# Patient Record
Sex: FEMALE | HISPANIC OR LATINO | Employment: FULL TIME | URBAN - METROPOLITAN AREA
[De-identification: names, ages, dates, MRNs, and addresses within clinical notes are randomized per-mention and may not be internally consistent; named-entity substitution may affect disease eponyms.]

---

## 2017-02-10 ENCOUNTER — OFFICE VISIT (OUTPATIENT)
Dept: URGENT CARE | Facility: URGENT CARE | Age: 52
End: 2017-02-10
Payer: COMMERCIAL

## 2017-02-10 VITALS
DIASTOLIC BLOOD PRESSURE: 84 MMHG | BODY MASS INDEX: 30.21 KG/M2 | SYSTOLIC BLOOD PRESSURE: 146 MMHG | OXYGEN SATURATION: 98 % | TEMPERATURE: 98.6 F | HEIGHT: 61 IN | HEART RATE: 101 BPM | WEIGHT: 160 LBS | RESPIRATION RATE: 15 BRPM

## 2017-02-10 DIAGNOSIS — E55.9 VITAMIN D DEFICIENCY: ICD-10-CM

## 2017-02-10 DIAGNOSIS — R82.90 NONSPECIFIC FINDING ON EXAMINATION OF URINE: ICD-10-CM

## 2017-02-10 DIAGNOSIS — N39.0 ACUTE UTI: Primary | ICD-10-CM

## 2017-02-10 DIAGNOSIS — R30.0 DYSURIA: ICD-10-CM

## 2017-02-10 LAB
ALBUMIN UR-MCNC: 100 MG/DL
APPEARANCE UR: ABNORMAL
BACTERIA #/AREA URNS HPF: ABNORMAL /HPF
BILIRUB UR QL STRIP: NEGATIVE
COLOR UR AUTO: YELLOW
GLUCOSE UR STRIP-MCNC: NEGATIVE MG/DL
HGB UR QL STRIP: ABNORMAL
KETONES UR STRIP-MCNC: NEGATIVE MG/DL
LEUKOCYTE ESTERASE UR QL STRIP: ABNORMAL
NITRATE UR QL: NEGATIVE
PH UR STRIP: 6 PH (ref 5–7)
RBC #/AREA URNS AUTO: ABNORMAL /HPF (ref 0–2)
SP GR UR STRIP: 1.02 (ref 1–1.03)
URN SPEC COLLECT METH UR: ABNORMAL
UROBILINOGEN UR STRIP-ACNC: 0.2 EU/DL (ref 0.2–1)
WBC #/AREA URNS AUTO: >100 /HPF (ref 0–2)

## 2017-02-10 PROCEDURE — 87186 SC STD MICRODIL/AGAR DIL: CPT | Performed by: INTERNAL MEDICINE

## 2017-02-10 PROCEDURE — 99214 OFFICE O/P EST MOD 30 MIN: CPT | Performed by: PHYSICIAN ASSISTANT

## 2017-02-10 PROCEDURE — 87088 URINE BACTERIA CULTURE: CPT | Performed by: INTERNAL MEDICINE

## 2017-02-10 PROCEDURE — 82306 VITAMIN D 25 HYDROXY: CPT | Performed by: NURSE PRACTITIONER

## 2017-02-10 PROCEDURE — 81001 URINALYSIS AUTO W/SCOPE: CPT | Performed by: INTERNAL MEDICINE

## 2017-02-10 PROCEDURE — 87086 URINE CULTURE/COLONY COUNT: CPT | Performed by: INTERNAL MEDICINE

## 2017-02-10 PROCEDURE — 36415 COLL VENOUS BLD VENIPUNCTURE: CPT | Performed by: NURSE PRACTITIONER

## 2017-02-10 RX ORDER — CEFUROXIME AXETIL 500 MG/1
500 TABLET ORAL 2 TIMES DAILY
Qty: 20 TABLET | Refills: 0 | Status: SHIPPED | OUTPATIENT
Start: 2017-02-10 | End: 2017-03-02

## 2017-02-10 RX ORDER — PHENAZOPYRIDINE HYDROCHLORIDE 100 MG/1
100 TABLET, FILM COATED ORAL 3 TIMES DAILY PRN
Qty: 6 TABLET | Refills: 0 | Status: SHIPPED | OUTPATIENT
Start: 2017-02-10 | End: 2017-11-16

## 2017-02-10 NOTE — LETTER
[facility]   [address], [place]  [address]  [phone]      February 14, 2017      Charu Contreras  [address]  [facility] [address]              Dear Ms. Contreras,    Vitamin D level is now in the normal range.  I would recommend taking vitamin D 2000 IU daily.    Results for orders placed or performed in visit on 02/10/17   *UA reflex to Microscopic and Culture ([facility] and [facility] (except [place] and [place])   Result Value Ref Range    Color Urine Yellow     Appearance Urine Slightly Cloudy     Glucose Urine Negative NEG mg/dL    Bilirubin Urine Negative NEG    Ketones Urine Negative NEG mg/dL    Specific Gravity Urine 1.025 1.003 - 1.035    Blood Urine Large (A) NEG    pH Urine 6.0 5.0 - 7.0 pH    Protein Albumin Urine 100 (A) NEG mg/dL    Urobilinogen Urine 0.2 0.2 - 1.0 EU/dL    Nitrite Urine Negative NEG    Leukocyte Esterase Urine Moderate (A) NEG    Source Midstream Urine    Urine Microscopic   Result Value Ref Range    WBC Urine >100 (A) 0 - 2 /HPF    RBC Urine  (A) 0 - 2 /HPF    Bacteria Urine Few (A) NEG /HPF   Vitamin D Deficiency   Result Value Ref Range    Vitamin D Deficiency screening 32 20 - 75 ug/L   Urine Culture Aerobic Bacterial   Result Value Ref Range    Specimen Description Midstream Urine     Culture Micro (A)      >100,000 colonies/mL Escherichia coli  >100,000 colonies/mL Strain 2 Proteus mirabilis      Micro Report Status FINAL 02/13/2017     Organism: >100,000 colonies/mL Escherichia coli     Organism: >100,000 colonies/mL Strain 2 Proteus mirabilis        Susceptibility    >100,000 colonies/ml escherichia coli (salvador) -  (no method available)     AMPICILLIN >=32 Resistant  ug/mL     CEFAZOLIN Value in next row  ug/mL      <=4 SusceptibleCefazolin SALVADOR breakpoints are for the treatment of uncomplicated urinary tract infections.  For the treatment of systemic infections, please contact the laboratory for additional  testing.     CEFOXITIN Value in next row  ug/mL      <=4 SusceptibleCefazolin CRISTI breakpoints are for the treatment of uncomplicated urinary tract infections.  For the treatment of systemic infections, please contact the laboratory for additional testing.     CEFTAZIDIME Value in next row  ug/mL      <=4 SusceptibleCefazolin CRISTI breakpoints are for the treatment of uncomplicated urinary tract infections.  For the treatment of systemic infections, please contact the laboratory for additional testing.     CEFTRIAXONE Value in next row  ug/mL      <=4 SusceptibleCefazolin CRISTI breakpoints are for the treatment of uncomplicated urinary tract infections.  For the treatment of systemic infections, please contact the laboratory for additional testing.     CIPROFLOXACIN Value in next row  ug/mL      <=4 SusceptibleCefazolin CRISTI breakpoints are for the treatment of uncomplicated urinary tract infections.  For the treatment of systemic infections, please contact the laboratory for additional testing.     GENTAMICIN Value in next row  ug/mL      <=4 SusceptibleCefazolin CRISTI breakpoints are for the treatment of uncomplicated urinary tract infections.  For the treatment of systemic infections, please contact the laboratory for additional testing.     LEVOFLOXACIN Value in next row  ug/mL      <=4 SusceptibleCefazolin CRISTI breakpoints are for the treatment of uncomplicated urinary tract infections.  For the treatment of systemic infections, please contact the laboratory for additional testing.     NITROFURANTOIN Value in next row  ug/mL      <=4 SusceptibleCefazolin CRISTI breakpoints are for the treatment of uncomplicated urinary tract infections.  For the treatment of systemic infections, please contact the laboratory for additional testing.     TOBRAMYCIN Value in next row  ug/mL      <=4 SusceptibleCefazolin CRISTI breakpoints are for the treatment of uncomplicated urinary tract infections.  For the treatment of systemic infections,  please contact the laboratory for additional testing.     Trimethoprim/Sulfa Value in next row  ug/mL      <=4 SusceptibleCefazolin SALVADOR breakpoints are for the treatment of uncomplicated urinary tract infections.  For the treatment of systemic infections, please contact the laboratory for additional testing.     AMPICILLIN/SULBACTAM Value in next row  ug/mL      <=4 SusceptibleCefazolin SALVADOR breakpoints are for the treatment of uncomplicated urinary tract infections.  For the treatment of systemic infections, please contact the laboratory for additional testing.     Piperacillin/Tazo Value in next row  ug/mL      <=4 SusceptibleCefazolin SALVADOR breakpoints are for the treatment of uncomplicated urinary tract infections.  For the treatment of systemic infections, please contact the laboratory for additional testing.     CEFEPIME Value in next row  ug/mL      <=4 SusceptibleCefazolin SALVADOR breakpoints are for the treatment of uncomplicated urinary tract infections.  For the treatment of systemic infections, please contact the laboratory for additional testing.    >100,000 colonies/ml strain 2 proteus mirabilis (salvador) -  (no method available)     AMPICILLIN Value in next row  ug/mL      <=4 SusceptibleCefazolin SALVADOR breakpoints are for the treatment of uncomplicated urinary tract infections.  For the treatment of systemic infections, please contact the laboratory for additional testing.     CEFAZOLIN Value in next row  ug/mL      8 SusceptibleCefazolin SALVADOR breakpoints are for the treatment of uncomplicated urinary tract infections.  For the treatment of systemic infections, please contact the laboratory for additional testing.     CEFOXITIN Value in next row  ug/mL      8 SusceptibleCefazolin SALVADOR breakpoints are for the treatment of uncomplicated urinary tract infections.  For the treatment of systemic infections, please contact the laboratory for additional testing.     CEFTAZIDIME Value in next row  ug/mL      8  SusceptibleCefazolin CRISTI breakpoints are for the treatment of uncomplicated urinary tract infections.  For the treatment of systemic infections, please contact the laboratory for additional testing.     CEFTRIAXONE Value in next row  ug/mL      8 SusceptibleCefazolin CRISTI breakpoints are for the treatment of uncomplicated urinary tract infections.  For the treatment of systemic infections, please contact the laboratory for additional testing.     CIPROFLOXACIN Value in next row  ug/mL      8 SusceptibleCefazolin CRISTI breakpoints are for the treatment of uncomplicated urinary tract infections.  For the treatment of systemic infections, please contact the laboratory for additional testing.     GENTAMICIN Value in next row  ug/mL      8 SusceptibleCefazolin CRISTI breakpoints are for the treatment of uncomplicated urinary tract infections.  For the treatment of systemic infections, please contact the laboratory for additional testing.     LEVOFLOXACIN Value in next row  ug/mL      8 SusceptibleCefazolin CRISTI breakpoints are for the treatment of uncomplicated urinary tract infections.  For the treatment of systemic infections, please contact the laboratory for additional testing.     NITROFURANTOIN Value in next row  ug/mL      8 SusceptibleCefazolin CRISTI breakpoints are for the treatment of uncomplicated urinary tract infections.  For the treatment of systemic infections, please contact the laboratory for additional testing.     TOBRAMYCIN Value in next row  ug/mL      8 SusceptibleCefazolin CRISTI breakpoints are for the treatment of uncomplicated urinary tract infections.  For the treatment of systemic infections, please contact the laboratory for additional testing.     Trimethoprim/Sulfa Value in next row  ug/mL      8 SusceptibleCefazolin CRISTI breakpoints are for the treatment of uncomplicated urinary tract infections.  For the treatment of systemic infections, please contact the laboratory for additional testing.      AMPICILLIN/SULBACTAM Value in next row  ug/mL      8 SusceptibleCefazolin CRISTI breakpoints are for the treatment of uncomplicated urinary tract infections.  For the treatment of systemic infections, please contact the laboratory for additional testing.     Piperacillin/Tazo Value in next row  ug/mL      8 SusceptibleCefazolin CRISTI breakpoints are for the treatment of uncomplicated urinary tract infections.  For the treatment of systemic infections, please contact the laboratory for additional testing.     CEFEPIME Value in next row  ug/mL      8 SusceptibleCefazolin CRISTI breakpoints are for the treatment of uncomplicated urinary tract infections.  For the treatment of systemic infections, please contact the laboratory for additional testing.           Sincerely,    Libby De Paz, ANGIE/nr

## 2017-02-11 NOTE — NURSING NOTE
"Chief Complaint   Patient presents with     Urgent Care     UTI     burning with urination, started today very bad. Going more often.        Initial /84 mmHg  Pulse 101  Temp(Src) 98.6  F (37  C) (Oral)  Resp 15  Ht 5' 1\" (1.549 m)  Wt 160 lb (72.576 kg)  BMI 30.25 kg/m2  SpO2 98% Estimated body mass index is 30.25 kg/(m^2) as calculated from the following:    Height as of this encounter: 5' 1\" (1.549 m).    Weight as of this encounter: 160 lb (72.576 kg).  Medication Reconciliation: complete   huong stallings    "

## 2017-02-11 NOTE — PROGRESS NOTES
"SUBJECTIVE:   Charu Contreras is a 51 year old female who  presents today for a possible UTI. Symptoms of dysuria, urgency and frequency have been going on for 1day(s).  Hematuria yes .  sudden onset and moderate.  There is no history of fever, chills, nausea or vomiting.  No history of penile discharge. This patient does have a history of urinary tract infections. Patient denies long duration, rigors, flank pain, temperature > 101 degrees F. and Vomiting, significant nausea or diarrhea.     Would also like her Vitamin D level drawn.  Future order in EPIC    No past medical history on file.  Patient Active Problem List   Diagnosis     Hyperlipidemia, unspecified hyperlipidemia type     Obesity, unspecified obesity severity, unspecified obesity type     Vitamin D deficiency     Social History   Substance Use Topics     Smoking status: Never Smoker      Smokeless tobacco: Not on file     Alcohol Use: No       ROS:   CONSTITUTIONAL:NEGATIVE for fever, chills, change in weight  INTEGUMENTARY/SKIN: NEGATIVE for worrisome rashes, moles or lesions  : as per HPI    OBJECTIVE:  /84 mmHg  Pulse 101  Temp(Src) 98.6  F (37  C) (Oral)  Resp 15  Ht 5' 1\" (1.549 m)  Wt 160 lb (72.576 kg)  BMI 30.25 kg/m2  SpO2 98%  GENERAL APPEARANCE: healthy, alert and no distress  ABDOMEN:  soft, nontender, no HSM or masses and bowel sounds normal  BACK: No CVA tenderness  SKIN: no suspicious lesions or rashes    (N39.0) Acute UTI  (primary encounter diagnosis)  Comment:   Plan: cefUROXime (CEFTIN) 500 MG tablet,         phenazopyridine (PYRIDIUM) 100 MG tablet            (R30.0) Dysuria  Comment:   Plan: *UA reflex to Microscopic and Culture         ([facility] (except         [place] and [place]), Urine Microscopic            (R82.90) Nonspecific finding on examination of urine  Comment:   Plan: Urine Culture Aerobic Bacterial            (E55.9) Vitamin D deficiency  Comment:   Plan: lab " drawn per PCP order.

## 2017-02-13 LAB
BACTERIA SPEC CULT: ABNORMAL
DEPRECATED CALCIDIOL+CALCIFEROL SERPL-MC: 32 UG/L (ref 20–75)
MICRO REPORT STATUS: ABNORMAL
MICROORGANISM SPEC CULT: ABNORMAL
MICROORGANISM SPEC CULT: ABNORMAL
SPECIMEN SOURCE: ABNORMAL

## 2017-03-02 ENCOUNTER — OFFICE VISIT (OUTPATIENT)
Dept: FAMILY MEDICINE | Facility: CLINIC | Age: 52
End: 2017-03-02
Payer: COMMERCIAL

## 2017-03-02 VITALS
OXYGEN SATURATION: 98 % | BODY MASS INDEX: 31.18 KG/M2 | TEMPERATURE: 97.1 F | RESPIRATION RATE: 18 BRPM | SYSTOLIC BLOOD PRESSURE: 135 MMHG | HEART RATE: 75 BPM | DIASTOLIC BLOOD PRESSURE: 73 MMHG | WEIGHT: 165 LBS

## 2017-03-02 DIAGNOSIS — R07.9 CHEST PAIN, UNSPECIFIED TYPE: Primary | ICD-10-CM

## 2017-03-02 DIAGNOSIS — R39.9 SYMPTOMS INVOLVING URINARY SYSTEM: ICD-10-CM

## 2017-03-02 LAB
ALBUMIN UR-MCNC: NEGATIVE MG/DL
APPEARANCE UR: CLEAR
BACTERIA #/AREA URNS HPF: ABNORMAL /HPF
BILIRUB UR QL STRIP: NEGATIVE
COLOR UR AUTO: YELLOW
GLUCOSE UR STRIP-MCNC: NEGATIVE MG/DL
HGB UR QL STRIP: NEGATIVE
KETONES UR STRIP-MCNC: NEGATIVE MG/DL
LEUKOCYTE ESTERASE UR QL STRIP: ABNORMAL
NITRATE UR QL: NEGATIVE
NON-SQ EPI CELLS #/AREA URNS LPF: ABNORMAL /LPF
PH UR STRIP: 6 PH (ref 5–7)
RBC #/AREA URNS AUTO: ABNORMAL /HPF (ref 0–2)
SP GR UR STRIP: >1.03 (ref 1–1.03)
URN SPEC COLLECT METH UR: ABNORMAL
UROBILINOGEN UR STRIP-ACNC: 0.2 EU/DL (ref 0.2–1)
WBC #/AREA URNS AUTO: ABNORMAL /HPF (ref 0–2)

## 2017-03-02 PROCEDURE — 81001 URINALYSIS AUTO W/SCOPE: CPT | Performed by: NURSE PRACTITIONER

## 2017-03-02 PROCEDURE — 99214 OFFICE O/P EST MOD 30 MIN: CPT | Performed by: NURSE PRACTITIONER

## 2017-03-02 PROCEDURE — 93000 ELECTROCARDIOGRAM COMPLETE: CPT | Performed by: NURSE PRACTITIONER

## 2017-03-02 NOTE — PROGRESS NOTES
SUBJECTIVE:                                                    Charu Contreras is a 51 year old female who presents to clinic today for the following health issues:    Prescription request: vitamin D liquid     Increased heart rate when exercising   She has intermittent chest pain that can occur about once a week and lasts for just minutes.  This can occur at rest.  She denies any shortness of breath, radiating pain, diaphoresis, nausea.  This has occurred for a couple of years.    When she starts running the machine will blink a warning that her heart rate is too high - it will be around 170-180.  She will not feel nauseated or dizzy.  When she starts walking it will come down to around 140-150.      No family history of heart disease.     She was treated recently for a UTI.  She is still having some mild dysuria and would like to make sure that it has resolved.            Problem list and histories reviewed & adjusted, as indicated.  Additional history: as documented    Patient Active Problem List   Diagnosis     Hyperlipidemia, unspecified hyperlipidemia type     Obesity, unspecified obesity severity, unspecified obesity type     Vitamin D deficiency     Past Surgical History   Procedure Laterality Date     Tubal ligation         Social History   Substance Use Topics     Smoking status: Never Smoker     Smokeless tobacco: Not on file     Alcohol use No     Family History   Problem Relation Age of Onset     Glaucoma No family hx of      Macular Degeneration No family hx of            Reviewed and updated as needed this visit by clinical staff       Reviewed and updated as needed this visit by Provider         ROS:  C: NEGATIVE for fever, chills, change in weight  E/M: NEGATIVE for ear, mouth and throat problems  R: NEGATIVE for significant cough or SOB  CV: see HPI  GI: NEGATIVE for nausea, abdominal pain, heartburn, or change in bowel habits  : see HPI  MUSCULOSKELETAL: NEGATIVE for significant  arthralgias or myalgia  NEURO: NEGATIVE for weakness, dizziness or paresthesias    OBJECTIVE:                                                    /73  Pulse 75  Temp 97.1  F (36.2  C) (Tympanic)  Resp 18  Wt 165 lb (74.8 kg)  SpO2 98%  BMI 31.18 kg/m2  Body mass index is 31.18 kg/(m^2).  GENERAL: healthy, alert and no distress  RESP: lungs clear to auscultation - no rales, rhonchi or wheezes  CV: regular rate and rhythm, normal S1 S2, no S3 or S4, no murmur, click or rub, no peripheral edema and peripheral pulses strong  MS: no gross musculoskeletal defects noted, no edema  SKIN: no suspicious lesions or rashes         ASSESSMENT/PLAN:                                                            1. Chest pain, unspecified type  Not typical for cardiac, but will obtain a stress echo to R/O.  Discussed a gradual conditioning with exercise to avoid a more rapid increase in heart rate.    - Exercise Stress Echocardiogram; Future  - EKG 12-lead complete w/read - Clinics    2. Symptoms involving urinary system  UA is negative.   - *UA reflex to Microscopic        Libby De Paz NP  [facility]

## 2017-03-02 NOTE — MR AVS SNAPSHOT
"              After Visit Summary   3/2/2017    Charu Contreras    MRN: [id number]           Patient Information     Date Of Birth          1965        Visit Information        Provider Department      3/2/2017 7:30 AM Libby De Paz NP [facility]        Today's Diagnoses     Chest pain, unspecified type    -  1    Symptoms involving urinary system          Care Instructions    Call [phone] to schedule a stress echocardiogram.         Follow-ups after your visit        Future tests that were ordered for you today     Open Future Orders        Priority Expected Expires Ordered    Exercise Stress Echocardiogram Routine  3/2/2018 3/2/2017            Who to contact     If you have questions or need follow up information about today's clinic visit or your schedule please contact [facility] directly at [phone].  Normal or non-critical lab and imaging results will be communicated to you by MyChart, letter or phone within 4 business days after the clinic has received the results. If you do not hear from us within 7 days, please contact the clinic through MyChart or phone. If you have a critical or abnormal lab result, we will notify you by phone as soon as possible.  Submit refill requests through "[employer] or call your pharmacy and they will forward the refill request to us. Please allow 3 business days for your refill to be completed.          Additional Information About Your Visit        MyChart Information     "[employer] lets you send messages to your doctor, view your test results, renew your prescriptions, schedule appointments and more. To sign up, go to [email or web address] . Click on \"Log in\" on the left side of the screen, which will take you to the Welcome page. Then click on \"Sign up Now\" on the right side of the page.     You will be asked to enter the access code listed below, as well as some personal information. Please follow the directions " to create your username and password.     Your access code is: [email or web address]  Expires: 2017  8:03 AM     Your access code will  in 90 days. If you need help or a new code, please call your [facility] or [phone].        Care EveryWhere ID     This is your Care EveryWhere ID. This could be used by other organizations to access your [place] medical records  [id number]        Your Vitals Were     Pulse Temperature Respirations Pulse Oximetry BMI (Body Mass Index)       75 97.1  F (36.2  C) (Tympanic) 18 98% 31.18 kg/m2        Blood Pressure from Last 3 Encounters:   17 135/73   02/10/17 146/84   16 130/76    Weight from Last 3 Encounters:   17 165 lb (74.8 kg)   02/10/17 160 lb (72.6 kg)   16 166 lb (75.3 kg)              We Performed the Following     *UA reflex to Microscopic     EKG 12-lead complete w/read - Clinics        Primary Care Provider    Physician No Ref-Primary       No address on file        Thank you!     Thank you for choosing [facility]  for your care. Our goal is always to provide you with excellent care. Hearing back from our patients is one way we can continue to improve our services. Please take a few minutes to complete the written survey that you may receive in the mail after your visit with us. Thank you!             Your Updated Medication List - Protect others around you: Learn how to safely use, store and throw away your medicines at www.disposemymeds.org.          This list is accurate as of: 3/2/17  8:03 AM.  Always use your most recent med list.                   Brand Name Dispense Instructions for use    clobetasol 0.05 % cream    TEMOVATE    30 g    Apply sparingly to affected area twice daily as needed       MULTI VITAMIN PO      Take by mouth daily       phenazopyridine 100 MG tablet    PYRIDIUM    6 tablet    Take 1 tablet (100 mg) by mouth 3 times daily as needed for irritation       VITAMIN D (CHOLECALCIFEROL) PO       Take by mouth daily

## 2017-03-02 NOTE — NURSING NOTE
"Chief Complaint   Patient presents with     Medication Request     vitamin D liquid      Tachycardia     increased heart rate when exercising        Initial /73  Pulse 75  Temp 97.1  F (36.2  C) (Tympanic)  Resp 18  Wt 165 lb (74.8 kg)  SpO2 98%  BMI 31.18 kg/m2 Estimated body mass index is 31.18 kg/(m^2) as calculated from the following:    Height as of 2/10/17: 5' 1\" (1.549 m).    Weight as of this encounter: 165 lb (74.8 kg).  Medication Reconciliation: complete     Kacie Mejia MA      "

## 2017-11-16 ENCOUNTER — OFFICE VISIT (OUTPATIENT)
Dept: FAMILY MEDICINE | Facility: CLINIC | Age: 52
End: 2017-11-16
Payer: COMMERCIAL

## 2017-11-16 VITALS
HEART RATE: 69 BPM | HEIGHT: 61 IN | WEIGHT: 158.5 LBS | DIASTOLIC BLOOD PRESSURE: 84 MMHG | OXYGEN SATURATION: 97 % | SYSTOLIC BLOOD PRESSURE: 120 MMHG | RESPIRATION RATE: 12 BRPM | BODY MASS INDEX: 29.92 KG/M2 | TEMPERATURE: 97.7 F

## 2017-11-16 DIAGNOSIS — Z11.59 NEED FOR HEPATITIS C SCREENING TEST: ICD-10-CM

## 2017-11-16 DIAGNOSIS — Z13.1 SCREENING FOR DIABETES MELLITUS: ICD-10-CM

## 2017-11-16 DIAGNOSIS — R21 RASH AND NONSPECIFIC SKIN ERUPTION: ICD-10-CM

## 2017-11-16 DIAGNOSIS — F32.0 MILD MAJOR DEPRESSION (H): ICD-10-CM

## 2017-11-16 DIAGNOSIS — Z13.0 SCREENING, ANEMIA, DEFICIENCY, IRON: ICD-10-CM

## 2017-11-16 DIAGNOSIS — E78.5 HYPERLIPIDEMIA, UNSPECIFIED HYPERLIPIDEMIA TYPE: ICD-10-CM

## 2017-11-16 DIAGNOSIS — Z00.00 ROUTINE GENERAL MEDICAL EXAMINATION AT A HEALTH CARE FACILITY: Primary | ICD-10-CM

## 2017-11-16 DIAGNOSIS — E66.9 OBESITY, UNSPECIFIED OBESITY SEVERITY, UNSPECIFIED OBESITY TYPE: ICD-10-CM

## 2017-11-16 DIAGNOSIS — Z12.31 ENCOUNTER FOR SCREENING MAMMOGRAM FOR BREAST CANCER: ICD-10-CM

## 2017-11-16 DIAGNOSIS — Z12.11 SPECIAL SCREENING FOR MALIGNANT NEOPLASMS, COLON: ICD-10-CM

## 2017-11-16 DIAGNOSIS — Z23 NEED FOR TDAP VACCINATION: ICD-10-CM

## 2017-11-16 DIAGNOSIS — E55.9 VITAMIN D DEFICIENCY: ICD-10-CM

## 2017-11-16 PROCEDURE — 90715 TDAP VACCINE 7 YRS/> IM: CPT | Performed by: FAMILY MEDICINE

## 2017-11-16 PROCEDURE — 82306 VITAMIN D 25 HYDROXY: CPT | Performed by: FAMILY MEDICINE

## 2017-11-16 PROCEDURE — 90471 IMMUNIZATION ADMIN: CPT | Performed by: FAMILY MEDICINE

## 2017-11-16 PROCEDURE — 86803 HEPATITIS C AB TEST: CPT | Performed by: FAMILY MEDICINE

## 2017-11-16 PROCEDURE — 99396 PREV VISIT EST AGE 40-64: CPT | Mod: 25 | Performed by: FAMILY MEDICINE

## 2017-11-16 PROCEDURE — 80050 GENERAL HEALTH PANEL: CPT | Performed by: FAMILY MEDICINE

## 2017-11-16 PROCEDURE — 36415 COLL VENOUS BLD VENIPUNCTURE: CPT | Performed by: FAMILY MEDICINE

## 2017-11-16 PROCEDURE — 80061 LIPID PANEL: CPT | Performed by: FAMILY MEDICINE

## 2017-11-16 NOTE — NURSING NOTE
"Chief Complaint   Patient presents with     Physical       Initial /84 (BP Location: Right arm, Patient Position: Chair, Cuff Size: Adult Regular)  Pulse 69  Temp 97.7  F (36.5  C) (Oral)  Resp 12  Ht 5' 1\" (1.549 m)  Wt 158 lb 8 oz (71.9 kg)  SpO2 97%  BMI 29.95 kg/m2 Estimated body mass index is 29.95 kg/(m^2) as calculated from the following:    Height as of this encounter: 5' 1\" (1.549 m).    Weight as of this encounter: 158 lb 8 oz (71.9 kg).  Medication Reconciliation: complete Ovidio Mejias MA      "

## 2017-11-16 NOTE — LETTER
My Depression Action Plan  Name: Charu Contreras   Date of Birth 1965  Date: 11/16/2017    My doctor: No Ref-Primary, Physician   My clinic: [address] [address] [phone]          GREEN    ZONE   Good Control    What it looks like:     Things are going generally well. You have normal up s and down s. You may even feel depressed from time to time, but bad moods usually last less than a day.   What you need to do:  1. Continue to care for yourself (see self care plan)  2. Check your depression survival kit and update it as needed  3. Follow your physician s recommendations including any medication.  4. Do not stop taking medication unless you consult with your physician first.           YELLOW         ZONE Getting Worse    What it looks like:     Depression is starting to interfere with your life.     It may be hard to get out of bed; you may be starting to isolate yourself from others.    Symptoms of depression are starting to last most all day and this has happened for several days.     You may have suicidal thoughts but they are not constant.   What you need to do:     1. Call your care team, your response to treatment will improve if you keep your care team informed of your progress. Yellow periods are signs an adjustment may need to be made.     2. Continue your self-care, even if you have to fake it!    3. Talk to someone in your support network    4. Open up your depression survival kit           RED    ZONE Medical Alert - Get Help    What it looks like:     Depression is seriously interfering with your life.     You may experience these or other symptoms: You can t get out of bed most days, can t work or engage in other necessary activities, you have trouble taking care of basic hygiene, or basic responsibilities, thoughts of suicide or death that will not go away, self-injurious behavior.     What you need to do:  1. Call  your care team and request a same-day appointment. If they are not available (weekends or after hours) call your local crisis line, emergency room or 911.      Electronically signed by: Lorrie Floyd, November 16, 2017    Depression Self Care Plan / Survival Kit    Self-Care for Depression  Here s the deal. Your body and mind are really not as separate as most people think.  What you do and think affects how you feel and how you feel influences what you do and think. This means if you do things that people who feel good do, it will help you feel better.  Sometimes this is all it takes.  There is also a place for medication and therapy depending on how severe your depression is, so be sure to consult with your medical provider and/ or Behavioral Health Consultant if your symptoms are worsening or not improving.     In order to better manage my stress, I will:    Exercise  Get some form of exercise, every day. This will help reduce pain and release endorphins, the  feel good  chemicals in your brain. This is almost as good as taking antidepressants!  This is not the same as joining a gym and then never going! (they count on that by the way ) It can be as simple as just going for a walk or doing some gardening, anything that will get you moving.      Hygiene   Maintain good hygiene (Get out of bed in the morning, Make your bed, Brush your teeth, Take a shower, and Get dressed like you were going to work, even if you are unemployed).  If your clothes don't fit try to get ones that do.    Diet  I will strive to eat foods that are good for me, drink plenty of water, and avoid excessive sugar, caffeine, alcohol, and other mood-altering substances.  Some foods that are helpful in depression are: complex carbohydrates, B vitamins, flaxseed, fish or fish oil, fresh fruits and vegetables.    Psychotherapy  I agree to participate in Individual Therapy (if recommended).    Medication  If prescribed medications, I agree to take  them.  Missing doses can result in serious side effects.  I understand that drinking alcohol, or other illicit drug use, may cause potential side effects.  I will not stop my medication abruptly without first discussing it with my provider.    Staying Connected With Others  I will stay in touch with my friends, family members, and my primary care provider/team.    Use your imagination  Be creative.  We all have a creative side; it doesn t matter if it s oil painting, sand castles, or mud pies! This will also kick up the endorphins.    Witness Beauty  (AKA stop and smell the roses) Take a look outside, even in mid-winter. Notice colors, textures. Watch the squirrels and birds.     Service to others  Be of service to others.  There is always someone else in need.  By helping others we can  get out of ourselves  and remember the really important things.  This also provides opportunities for practicing all the other parts of the program.    Humor  Laugh and be silly!  Adjust your TV habits for less news and crime-drama and more comedy.    Control your stress  Try breathing deep, massage therapy, biofeedback, and meditation. Find time to relax each day.     My support system    Clinic Contact:  Phone number:    Contact 1:  Phone number:    Contact 2:  Phone number:    Zoroastrian/:  Phone number:    Therapist:  Phone number:    Local crisis center:    Phone number:    Other community support:  Phone number:

## 2017-11-16 NOTE — PATIENT INSTRUCTIONS
Breast exam monthly   mammogram ordered  Pelvic /pap due 2021 with HPV  Labs today   Get us report of normal colonoscopy you said you had 2 yrs ago  Recommend flu shot yearly ( declined)   Tdap given today   For rash see dermatology  Do dexa and stress echo ordered previously     Preventive Health Recommendations  Female Ages 50 - 64    Yearly exam: See your health care provider every year in order to  o Review health changes.   o Discuss preventive care.    o Review your medicines if your doctor has prescribed any.      Get a Pap test every three years (unless you have an abnormal result and your provider advises testing more often).    If you get Pap tests with HPV test, you only need to test every 5 years, unless you have an abnormal result.     You do not need a Pap test if your uterus was removed (hysterectomy) and you have not had cancer.    You should be tested each year for STDs (sexually transmitted diseases) if you're at risk.     Have a mammogram every 1 to 2 years.    Have a colonoscopy at age 50, or have a yearly FIT test (stool test). These exams screen for colon cancer.      Have a cholesterol test every 5 years, or more often if advised.    Have a diabetes test (fasting glucose) every three years. If you are at risk for diabetes, you should have this test more often.     If you are at risk for osteoporosis (brittle bone disease), think about having a bone density scan (DEXA).    Shots: Get a flu shot each year. Get a tetanus shot every 10 years.    Nutrition:     Eat at least 5 servings of fruits and vegetables each day.    Eat whole-grain bread, whole-wheat pasta and brown rice instead of white grains and rice.    Talk to your provider about Calcium and Vitamin D.     Lifestyle    Exercise at least 150 minutes a week (30 minutes a day, 5 days a week). This will help you control your weight and prevent disease.    Limit alcohol to one drink per day.    No smoking.     Wear sunscreen to prevent skin  cancer.     See your dentist every six months for an exam and cleaning.    See your eye doctor every 1 to 2 years.    Managing Atopic Dermatitis (Eczema)     After bathing, gently pat your skin dry (don t rub). Apply moisturizer while your skin is still damp.   To manage your symptoms and help reduce the severity and frequency, try these self-care tips:  Caring for your skin    Use a gentle, fragrance-free cleanser (or nonsoap cleanser) for bathing. Rinse well. Pat skin dry.    Take warm, not hot, baths or showers. Try to limit them to no more that 10 to 15 minutes.     Use moisturizer liberally right after you bathe, while your skin is still damp.    Avoid scratching because it will cause more damage to your skin.     Topical, over-the-counter hydrocortisone cream may help control mild symptoms.   Controlling your environment    Avoid extreme heat or cold.    Avoid very humid or very dry air.    If your home or office air is very dry, use a humidifier.    Avoid allergens, such as dust, that may be present in bedding, carpets, plush toys, or rugs.    Know that pet hair and dander can cause flare-ups.  Seeking medical treatment  Another way to keep symptoms under control is to seek medical treatment. Talk with your healthcare provider about the type of treatment that may work best for you. Your provider may prescribe treatments such as the following:    Topical treatments to put on the skin daily    Medicines taken by mouth (oral medicines), such as antihistamines, antibiotics, or corticosteroids    In severe cases shots (injections) may be needed to control the symptoms. You may even need antibiotics if skin infections occur.  Treatments don t work the same way for every person. So if your symptoms continue or get worse, ask your healthcare provider about other treatments.  Making lifestyle choices    Manage the stress in your life.    Wear loose-fitting cotton clothing that does not bind or rub your skin.    Avoid  contact with wool or other scratchy fabrics.    Use fragrance-free products.  Getting good results  Now that you know more about atopic dermatitis, the next step is up to you. Follow your healthcare provider s treatment plan and your self-care routine. This will help bring atopic dermatitis under control. If your symptoms persist, be sure to let your health care provider know.   Date Last Reviewed: 2/1/2017 2000-2017 [employer]. [address] [place] [address], [place] [address]. All rights reserved. This information is not intended as a substitute for professional medical care. Always follow your healthcare professional's instructions.        What is Atopic Dermatitis?  Atopic dermatitis (also called eczema) causes chronic skin irritation. It is often found in infants, teens, and adults. This disease often runs in families (is genetic). It may also be linked to allergies, such as hay fever and sometimes asthma. Patches of skin become dry, red, itchy, and scaly. In older adults, abnormally dry skin is often called xerosis. Sometimes eczema is only on the hands or feet. It often improves when the skin is well hydrated. It gets worse when the skin is dry. You can help control symptoms by practicing good self-care. Avoid anything that causes flare-ups (such as sunburn or vigorous scratching).  Where do you have symptoms?  Atopic dermatitis symptoms can appear anywhere on the body. But in most cases they vary based on the person s age. In infants, irritation is often seen on the cheeks, chin, near the mouth, and under the eyelids. In children ages 2 through 10, skin folds, such as the backs of the knees, or in the arm crease, are most often affected. In children 11 and older and in adults, symptoms can affect many areas.  What triggers symptoms?  Symptoms flare because of many things. These include skin dryness, scratching, stress, harsh soaps, and irritants such as dust or wool. Try to avoid anything that  causes flare-ups.  Recognizing what causes flare-ups  To figure out what causes atopic dermatitis to flare, keep a list of things that seem to affect your skin. Start by filling in the spaces below. Then keep writing them down in a notebook or diary. The things that affect each person vary. So keep your own list and try to avoid your triggers.    Date Last Reviewed: 2/1/2017 2000-2017 [employer]. [address] [place] [address], [place] [address]. All rights reserved. This information is not intended as a substitute for professional medical care. Always follow your healthcare professional's instructions.

## 2017-11-16 NOTE — LETTER
November 17, 2017      Charu Contreras  [address]  [facility] [address]        Dear ,    We are writing to inform you of your test results.    Results within acceptable limits.  -Normal red blood cell (hgb) levels, normal white blood cell count and normal platelet levels..    Resulted Orders   CBC with platelets differential   Result Value Ref Range    WBC 8.8 4.0 - 11.0 10e9/L    RBC Count 4.58 3.8 - 5.2 10e12/L    Hemoglobin 13.9 11.7 - 15.7 g/dL    Hematocrit 42.4 35.0 - 47.0 %    MCV 93 78 - 100 fl    MCH 30.3 26.5 - 33.0 pg    MCHC 32.8 31.5 - 36.5 g/dL    RDW 13.3 10.0 - 15.0 %    Platelet Count 305 150 - 450 10e9/L    Diff Method Automated Method     % Neutrophils 59.4 %    % Lymphocytes 30.2 %    % Monocytes 7.6 %    % Eosinophils 2.5 %    % Basophils 0.3 %    Absolute Neutrophil 5.2 1.6 - 8.3 10e9/L    Absolute Lymphocytes 2.7 0.8 - 5.3 10e9/L    Absolute Monocytes 0.7 0.0 - 1.3 10e9/L    Absolute Eosinophils 0.2 0.0 - 0.7 10e9/L    Absolute Basophils 0.0 0.0 - 0.2 10e9/L     If you have any questions or concerns, please call the clinic at the number listed above.   Sincerely,  Lorrie Floyd MD/nr

## 2017-11-16 NOTE — MR AVS SNAPSHOT
After Visit Summary   11/16/2017    Charu Contreras    MRN: [id number]           Patient Information     Date Of Birth          1965        Visit Information        Provider Department      11/16/2017 11:40 AM Lorrie Floyd MD [facility]        Today's Diagnoses     Routine general medical examination at a health care facility    -  1    Encounter for screening mammogram for breast cancer        Obesity, unspecified obesity severity, unspecified obesity type        Hyperlipidemia, unspecified hyperlipidemia type        Vitamin D deficiency        Rash and nonspecific skin eruption        Screening, anemia, deficiency, iron        Screening for diabetes mellitus        Need for hepatitis C screening test        Special screening for malignant neoplasms, colon        Need for Tdap vaccination        Mild major depression (H)          Care Instructions    Breast exam monthly   mammogram ordered  Pelvic /pap due 2021 with HPV  Labs today   Get us report of normal colonoscopy you said you had 2 yrs ago  Recommend flu shot yearly ( declined)   Tdap given today   For rash see dermatology  Do dexa and stress echo ordered previously     Preventive Health Recommendations  Female Ages 50 - 64    Yearly exam: See your health care provider every year in order to  o Review health changes.   o Discuss preventive care.    o Review your medicines if your doctor has prescribed any.      Get a Pap test every three years (unless you have an abnormal result and your provider advises testing more often).    If you get Pap tests with HPV test, you only need to test every 5 years, unless you have an abnormal result.     You do not need a Pap test if your uterus was removed (hysterectomy) and you have not had cancer.    You should be tested each year for STDs (sexually transmitted diseases) if you're at risk.     Have a mammogram every 1 to 2 years.    Have a colonoscopy at age 50, or have a yearly  FIT test (stool test). These exams screen for colon cancer.      Have a cholesterol test every 5 years, or more often if advised.    Have a diabetes test (fasting glucose) every three years. If you are at risk for diabetes, you should have this test more often.     If you are at risk for osteoporosis (brittle bone disease), think about having a bone density scan (DEXA).    Shots: Get a flu shot each year. Get a tetanus shot every 10 years.    Nutrition:     Eat at least 5 servings of fruits and vegetables each day.    Eat whole-grain bread, whole-wheat pasta and brown rice instead of white grains and rice.    Talk to your provider about Calcium and Vitamin D.     Lifestyle    Exercise at least 150 minutes a week (30 minutes a day, 5 days a week). This will help you control your weight and prevent disease.    Limit alcohol to one drink per day.    No smoking.     Wear sunscreen to prevent skin cancer.     See your dentist every six months for an exam and cleaning.    See your eye doctor every 1 to 2 years.    Managing Atopic Dermatitis (Eczema)     After bathing, gently pat your skin dry (don t rub). Apply moisturizer while your skin is still damp.   To manage your symptoms and help reduce the severity and frequency, try these self-care tips:  Caring for your skin    Use a gentle, fragrance-free cleanser (or nonsoap cleanser) for bathing. Rinse well. Pat skin dry.    Take warm, not hot, baths or showers. Try to limit them to no more that 10 to 15 minutes.     Use moisturizer liberally right after you bathe, while your skin is still damp.    Avoid scratching because it will cause more damage to your skin.     Topical, over-the-counter hydrocortisone cream may help control mild symptoms.   Controlling your environment    Avoid extreme heat or cold.    Avoid very humid or very dry air.    If your home or office air is very dry, use a humidifier.    Avoid allergens, such as dust, that may be present in bedding, carpets,  plush toys, or rugs.    Know that pet hair and dander can cause flare-ups.  Seeking medical treatment  Another way to keep symptoms under control is to seek medical treatment. Talk with your healthcare provider about the type of treatment that may work best for you. Your provider may prescribe treatments such as the following:    Topical treatments to put on the skin daily    Medicines taken by mouth (oral medicines), such as antihistamines, antibiotics, or corticosteroids    In severe cases shots (injections) may be needed to control the symptoms. You may even need antibiotics if skin infections occur.  Treatments don t work the same way for every person. So if your symptoms continue or get worse, ask your healthcare provider about other treatments.  Making lifestyle choices    Manage the stress in your life.    Wear loose-fitting cotton clothing that does not bind or rub your skin.    Avoid contact with wool or other scratchy fabrics.    Use fragrance-free products.  Getting good results  Now that you know more about atopic dermatitis, the next step is up to you. Follow your healthcare provider s treatment plan and your self-care routine. This will help bring atopic dermatitis under control. If your symptoms persist, be sure to let your health care provider know.   Date Last Reviewed: 2/1/2017 2000-2017 [employer]. [address] [place] [address], [place] [address]. All rights reserved. This information is not intended as a substitute for professional medical care. Always follow your healthcare professional's instructions.        What is Atopic Dermatitis?  Atopic dermatitis (also called eczema) causes chronic skin irritation. It is often found in infants, teens, and adults. This disease often runs in families (is genetic). It may also be linked to allergies, such as hay fever and sometimes asthma. Patches of skin become dry, red, itchy, and scaly. In older adults, abnormally dry skin is often called  xerosis. Sometimes eczema is only on the hands or feet. It often improves when the skin is well hydrated. It gets worse when the skin is dry. You can help control symptoms by practicing good self-care. Avoid anything that causes flare-ups (such as sunburn or vigorous scratching).  Where do you have symptoms?  Atopic dermatitis symptoms can appear anywhere on the body. But in most cases they vary based on the person s age. In infants, irritation is often seen on the cheeks, chin, near the mouth, and under the eyelids. In children ages 2 through 10, skin folds, such as the backs of the knees, or in the arm crease, are most often affected. In children 11 and older and in adults, symptoms can affect many areas.  What triggers symptoms?  Symptoms flare because of many things. These include skin dryness, scratching, stress, harsh soaps, and irritants such as dust or wool. Try to avoid anything that causes flare-ups.  Recognizing what causes flare-ups  To figure out what causes atopic dermatitis to flare, keep a list of things that seem to affect your skin. Start by filling in the spaces below. Then keep writing them down in a notebook or diary. The things that affect each person vary. So keep your own list and try to avoid your triggers.    Date Last Reviewed: 2/1/2017 2000-2017 [employer]. [address] [place] [address], [place] [address]. All rights reserved. This information is not intended as a substitute for professional medical care. Always follow your healthcare professional's instructions.                Follow-ups after your visit        Additional Services     DERMATOLOGY REFERRAL       Your provider has referred you to: [facility]: [facility] [phone]   [email or web address]  [facility]: [facility] [phone]  [facility]: Dermatology [facility] [phone]    [email or web address]  N: [facility] [phone]   [email or web address]    Please be aware that coverage of these services is subject to the terms and limitations of your health insurance plan.  Call member services at your health plan with any benefit or coverage questions.      Please bring the following with you to your appointment:    (1) Any X-Rays, CTs or MRIs which have been performed.  Contact the facility where they were done to arrange for  prior to your scheduled appointment.  Any new CT, MRI or other procedures ordered by your specialist must be performed at a [facility] or coordinated by your clinic's referral office.  (2) List of current medications  (3) This referral request   (4) Any documents/labs given to you for this referral                  Your next 10 appointments already scheduled     Nov 22, 2017  9:45 AM CST   MA SCREENING DIGITAL BILATERAL with [id number]   [facility] ([facility]    [address] [address] [phone]           Do not use any powder, lotion or deodorant under your arms or on your breast. If you do, we will ask you to remove it before your exam.  Wear comfortable, two-piece clothing.  If you have any allergies, tell your care team.  Bring any previous mammograms from other facilities or have them mailed to the breast center. Three-dimensional (3D) mammograms are available at [facility] locations in [facility], [facility], [facility], and [place]. [facility] locations include [facility] in [place]. Benefits of 3D mammograms include: - Improved rate of cancer detection - Decreases your chance of having to go back for more tests, which means fewer: - \"False-positive\" results (This means that there is an abnormal area but it isn't cancer.) - " Invasive testing procedures, such as a biopsy or surgery - Can provide clearer images of the breast if you have dense breast tissue. 3D mammography is an optional exam that anyone can have with a 2D mammogram. It doesn't replace or take the place of a 2D mammogram. 2D mammograms remain an effective screening test for all women.  Not all insurance companies cover the cost of a 3D mammogram. Check with your insurance.            Nov 22, 2017  1:00 PM CST   Ech Stress Test with [id number]   [facility] CV Echocardiography (Cardiovascular Imaging at [facility])    [address] [address] [phone]           1. Please bring or wear a comfortable two-piece outfit and walking shoes. 2. Stop eating 3 hours before the test. You may drink water or juice. 3. Stop all caffeine 12 hours before the test. This includes coffee, tea, soda pop, chocolate and certain medicines (such as Anacin and Excederin). Also avoid decaf coffee and tea, as these contain small amounts of caffeine. 4. No alcohol, smoking or use of other tobacco products for 12 hours before the test. 5. Refer to your provider instructions to see if you need to stop any medications (such as beta-blockers or nitrates) for this test. 6. For patients with diabetes: - If you take insulin, call your diabetes care team. Ask if you should take a   dose the morning of your test. - If you take diabetes medicine by mouth, dont take it on the morning of your test. Bring it with you to take after the test. (If you have questions, call your diabetes care team) 7. When you arrive, please tell us if: - You have diabetes. - You have taken Viagra, Cialis or Levitra in the past 48 hours. 8. For any questions that cannot be answered, please contact the ordering physician              Future tests that were ordered for you today     Open Future Orders        Priority Expected Expires Ordered    MA Screening Digital Bilateral Routine  " 2018            Who to contact     If you have questions or need follow up information about today's clinic visit or your schedule please contact [facility] directly at [phone].  Normal or non-critical lab and imaging results will be communicated to you by MyChart, letter or phone within 4 business days after the clinic has received the results. If you do not hear from us within 7 days, please contact the clinic through mobiManagehart or phone. If you have a critical or abnormal lab result, we will notify you by phone as soon as possible.  Submit refill requests through veriCAR or call your pharmacy and they will forward the refill request to us. Please allow 3 business days for your refill to be completed.          Additional Information About Your Visit        mobiManageharQuotte Information     veriCAR lets you send messages to your doctor, view your test results, renew your prescriptions, schedule appointments and more. To sign up, go to [email or web address] . Click on \"Log in\" on the left side of the screen, which will take you to the Welcome page. Then click on \"Sign up Now\" on the right side of the page.     You will be asked to enter the access code listed below, as well as some personal information. Please follow the directions to create your username and password.     Your access code is: [email or web address]  Expires: 2018 10:24 AM     Your access code will  in 90 days. If you need help or a new code, please call your [facility] clinic or [phone].        Care EveryWhere ID     This is your Care EveryWhere ID. This could be used by other organizations to access your [facility] medical records  [id number]        Your Vitals Were     Pulse Temperature Respirations Height Pulse Oximetry BMI (Body Mass Index)    69 97.7  F (36.5  C) (Oral) 12 5' 1\" (1.549 m) 97% 29.95 kg/m2       Blood Pressure from Last 3 Encounters:   17 120/84   17 135/73   02/10/17 146/84    Weight " from Last 3 Encounters:   11/16/17 158 lb 8 oz (71.9 kg)   03/02/17 165 lb (74.8 kg)   02/10/17 160 lb (72.6 kg)              We Performed the Following     CBC with platelets differential     Comprehensive metabolic panel     DEPRESSION ACTION PLAN (DAP)     DERMATOLOGY REFERRAL     Hepatitis C Screen Reflex to HCV RNA Quant and Genotype     Lipid panel reflex to direct LDL Fasting     OFFICE/OUTPT VISIT,EST,LEVL III     TDAP VACCINE (ADACEL)     TSH with free T4 reflex     Vitamin D Deficiency        Primary Care Provider    Physician No Ref-Primary       NO REF-PRIMARY PHYSICIAN        Equal Access to Services     AVELINA KEYES : Hadii chilo Reddy, waaxda luqadaha, qaybta kaalmachauncey jeong, cornelio mcmahon . So [facility] [phone].    ATENCIÓN: Si habla español, tiene a severino disposición servicios gratuitos de asistencia lingüística. Llame al [phone].    We comply with applicable federal civil rights laws and [place] laws. We do not discriminate on the basis of race, color, national origin, age, disability, sex, sexual orientation, or gender identity.            Thank you!     Thank you for choosing [facility]  for your care. Our goal is always to provide you with excellent care. Hearing back from our patients is one way we can continue to improve our services. Please take a few minutes to complete the written survey that you may receive in the mail after your visit with us. Thank you!             Your Updated Medication List - Protect others around you: Learn how to safely use, store and throw away your medicines at www.disposemymeds.org.          This list is accurate as of: 11/16/17 12:33 PM.  Always use your most recent med list.                   Brand Name Dispense Instructions for use Diagnosis    clobetasol 0.05 % cream    TEMOVATE    30 g    Apply sparingly to affected area twice daily as needed    Dermatitis       GLUCOSAMINE SULFATE PO            MULTI VITAMIN PO      Take by mouth daily        VITAMIN D (CHOLECALCIFEROL) PO      Take by mouth daily

## 2017-11-16 NOTE — LETTER
November 17, 2017      Charu Contreras  [address]  [facility] [address]        Dear ,    We are writing to inform you of your test results.    Results within acceptable limits.  -Liver and gallbladder tests (ALT,AST, Alk phos,bilirubin) are normal.  -Kidney function (GFR) is normal.  -Sodium is normal.  -Potassium is normal.  -Glucose (diabetic screening test) is normal.  Calcium is elevated recheck in 1 month , will put in orders  -LDL(bad) cholesterol level is elevated, HDL(good) cholesterol level is low and your triglycerides are elevated which can increase your heart disease risk.  A diet high in fat and simple carbohydrates, genetics and being overweight can contribute to this. ADVISE: Exercise, a low fat, low carbohydrate diet, weight control, and omega-3 fatty acids (fish oil) 1808-2672 mg daily are helpful to improve this.  Rechecking your fasting cholesterol panel in 6 months is recommended (Lipid w/ LDL reflex, DX: hyperlipidemia)  The 10-year ASCVD risk score (Clementine MATTHIAS Jr, et al., 2013) is: 2.2%    Values used to calculate the score:      Age: 52 years      Sex: Female      Is Non- : No      Diabetic: No      Tobacco smoker: No      Systolic Blood Pressure: 120 mmHg      Is BP treated: No      HDL Cholesterol: 49 mg/dL      Total Cholesterol: 245 mg/dL    -TSH (thyroid stimulating hormone) level is normal which indicates normal thyroid function..    Resulted Orders   CBC with platelets differential   Result Value Ref Range    WBC 8.8 4.0 - 11.0 10e9/L    RBC Count 4.58 3.8 - 5.2 10e12/L    Hemoglobin 13.9 11.7 - 15.7 g/dL    Hematocrit 42.4 35.0 - 47.0 %    MCV 93 78 - 100 fl    MCH 30.3 26.5 - 33.0 pg    MCHC 32.8 31.5 - 36.5 g/dL    RDW 13.3 10.0 - 15.0 %    Platelet Count 305 150 - 450 10e9/L    Diff Method Automated Method     % Neutrophils 59.4 %    % Lymphocytes 30.2 %    % Monocytes 7.6 %    % Eosinophils 2.5 %    % Basophils 0.3 %    Absolute  Neutrophil 5.2 1.6 - 8.3 10e9/L    Absolute Lymphocytes 2.7 0.8 - 5.3 10e9/L    Absolute Monocytes 0.7 0.0 - 1.3 10e9/L    Absolute Eosinophils 0.2 0.0 - 0.7 10e9/L    Absolute Basophils 0.0 0.0 - 0.2 10e9/L   Comprehensive metabolic panel   Result Value Ref Range    Sodium 139 133 - 144 mmol/L    Potassium 4.2 3.4 - 5.3 mmol/L    Chloride 106 94 - 109 mmol/L    Carbon Dioxide 26 20 - 32 mmol/L    Anion Gap 7 3 - 14 mmol/L    Glucose 74 70 - 99 mg/dL      Comment:      Fasting specimen    Urea Nitrogen 11 7 - 30 mg/dL    Creatinine 0.67 0.52 - 1.04 mg/dL    GFR Estimate >90 >60 mL/min/1.7m2      Comment:      Non  GFR Calc    GFR Estimate If Black >90 >60 mL/min/1.7m2      Comment:       GFR Calc    Calcium 10.8 (H) 8.5 - 10.1 mg/dL    Bilirubin Total 0.4 0.2 - 1.3 mg/dL    Albumin 4.1 3.4 - 5.0 g/dL    Protein Total 8.4 6.8 - 8.8 g/dL    Alkaline Phosphatase 142 40 - 150 U/L    ALT 37 0 - 50 U/L    AST 18 0 - 45 U/L   Lipid panel reflex to direct LDL Fasting   Result Value Ref Range    Cholesterol 245 (H) <200 mg/dL      Comment:      Desirable:       <200 mg/dl    Triglycerides 179 (H) <150 mg/dL      Comment:      Borderline high:  150-199 mg/dl  High:             200-499 mg/dl  Very high:       >499 mg/dl  Fasting specimen      HDL Cholesterol 49 (L) >49 mg/dL    LDL Cholesterol Calculated 160 (H) <100 mg/dL      Comment:      Above desirable:  100-129 mg/dl  Borderline High:  130-159 mg/dL  High:             160-189 mg/dL  Very high:       >189 mg/dl      Non HDL Cholesterol 196 (H) <130 mg/dL      Comment:      Above Desirable:  130-159 mg/dl  Borderline high:  160-189 mg/dl  High:             190-219 mg/dl  Very high:       >219 mg/dl     TSH with free T4 reflex   Result Value Ref Range    TSH 2.42 0.40 - 4.00 mU/L       If you have any questions or concerns, please call the clinic at the number listed above.       Sincerely,        Lorrie Floyd MD/nr

## 2017-11-16 NOTE — NURSING NOTE
Screening Questionnaire for Adult Immunization    Are you sick today?   No   Do you have allergies to medications, food, a vaccine component or latex?   No   Have you ever had a serious reaction after receiving a vaccination?   No   Do you have a long-term health problem with heart disease, lung disease, asthma, kidney disease, metabolic disease (e.g. diabetes), anemia, or other blood disorder?   No   Do you have cancer, leukemia, HIV/AIDS, or any other immune system problem?   No   In the past 3 months, have you taken medications that affect  your immune system, such as prednisone, other steroids, or anticancer drugs; drugs for the treatment of rheumatoid arthritis, Crohn s disease, or psoriasis; or have you had radiation treatments?   No   Have you had a seizure, or a brain or other nervous system problem?   No   During the past year, have you received a transfusion of blood or blood     products, or been given immune (gamma) globulin or antiviral drug?   No   For women: Are you pregnant or is there a chance you could become        pregnant during the next month?   No   Have you received any vaccinations in the past 4 weeks?   No     Immunization questionnaire answers were all negative.        Per orders of Dr. Lorrie Floyd, injection of tdap given by Ovidio Mejias. Patient instructed to remain in clinic for 15 minutes afterwards, and to report any adverse reaction to me immediately.       Screening performed by Ovidio Mejias on 11/16/2017 at 12:24 PM.

## 2017-11-17 ENCOUNTER — TELEPHONE (OUTPATIENT)
Dept: FAMILY MEDICINE | Facility: CLINIC | Age: 52
End: 2017-11-17

## 2017-11-17 DIAGNOSIS — E83.52 SERUM CALCIUM ELEVATED: Primary | ICD-10-CM

## 2017-11-17 LAB
ALBUMIN SERPL-MCNC: 4.1 G/DL (ref 3.4–5)
ALP SERPL-CCNC: 142 U/L (ref 40–150)
ALT SERPL W P-5'-P-CCNC: 37 U/L (ref 0–50)
ANION GAP SERPL CALCULATED.3IONS-SCNC: 7 MMOL/L (ref 3–14)
AST SERPL W P-5'-P-CCNC: 18 U/L (ref 0–45)
BASOPHILS # BLD AUTO: 0 10E9/L (ref 0–0.2)
BASOPHILS NFR BLD AUTO: 0.3 %
BILIRUB SERPL-MCNC: 0.4 MG/DL (ref 0.2–1.3)
BUN SERPL-MCNC: 11 MG/DL (ref 7–30)
CALCIUM SERPL-MCNC: 10.8 MG/DL (ref 8.5–10.1)
CHLORIDE SERPL-SCNC: 106 MMOL/L (ref 94–109)
CHOLEST SERPL-MCNC: 245 MG/DL
CO2 SERPL-SCNC: 26 MMOL/L (ref 20–32)
CREAT SERPL-MCNC: 0.67 MG/DL (ref 0.52–1.04)
DEPRECATED CALCIDIOL+CALCIFEROL SERPL-MC: 34 UG/L (ref 20–75)
DIFFERENTIAL METHOD BLD: NORMAL
EOSINOPHIL # BLD AUTO: 0.2 10E9/L (ref 0–0.7)
EOSINOPHIL NFR BLD AUTO: 2.5 %
ERYTHROCYTE [DISTWIDTH] IN BLOOD BY AUTOMATED COUNT: 13.3 % (ref 10–15)
GFR SERPL CREATININE-BSD FRML MDRD: >90 ML/MIN/1.7M2
GLUCOSE SERPL-MCNC: 74 MG/DL (ref 70–99)
HCT VFR BLD AUTO: 42.4 % (ref 35–47)
HCV AB SERPL QL IA: NONREACTIVE
HDLC SERPL-MCNC: 49 MG/DL
HGB BLD-MCNC: 13.9 G/DL (ref 11.7–15.7)
LDLC SERPL CALC-MCNC: 160 MG/DL
LYMPHOCYTES # BLD AUTO: 2.7 10E9/L (ref 0.8–5.3)
LYMPHOCYTES NFR BLD AUTO: 30.2 %
MCH RBC QN AUTO: 30.3 PG (ref 26.5–33)
MCHC RBC AUTO-ENTMCNC: 32.8 G/DL (ref 31.5–36.5)
MCV RBC AUTO: 93 FL (ref 78–100)
MONOCYTES # BLD AUTO: 0.7 10E9/L (ref 0–1.3)
MONOCYTES NFR BLD AUTO: 7.6 %
NEUTROPHILS # BLD AUTO: 5.2 10E9/L (ref 1.6–8.3)
NEUTROPHILS NFR BLD AUTO: 59.4 %
NONHDLC SERPL-MCNC: 196 MG/DL
PLATELET # BLD AUTO: 305 10E9/L (ref 150–450)
POTASSIUM SERPL-SCNC: 4.2 MMOL/L (ref 3.4–5.3)
PROT SERPL-MCNC: 8.4 G/DL (ref 6.8–8.8)
RBC # BLD AUTO: 4.58 10E12/L (ref 3.8–5.2)
SODIUM SERPL-SCNC: 139 MMOL/L (ref 133–144)
TRIGL SERPL-MCNC: 179 MG/DL
TSH SERPL DL<=0.005 MIU/L-ACNC: 2.42 MU/L (ref 0.4–4)
WBC # BLD AUTO: 8.8 10E9/L (ref 4–11)

## 2017-11-17 NOTE — PROGRESS NOTES
Results within acceptable limits.  -Hepatitis C antibody screen test shows no signs of a previous hepatitis C infection.  -Vitamin D level is normal, 1000 IU daily in diet or supplements is recommended. .

## 2017-11-17 NOTE — PROGRESS NOTES
Results within acceptable limits.  -Liver and gallbladder tests (ALT,AST, Alk phos,bilirubin) are normal.  -Kidney function (GFR) is normal.  -Sodium is normal.  -Potassium is normal.  -Glucose (diabetic screening test) is normal.  Calcium is elevated recheck in 1 month , will put in orders  -LDL(bad) cholesterol level is elevated, HDL(good) cholesterol level is low and your triglycerides are elevated which can increase your heart disease risk.  A diet high in fat and simple carbohydrates, genetics and being overweight can contribute to this. ADVISE: Exercise, a low fat, low carbohydrate diet, weight control, and omega-3 fatty acids (fish oil) 6696-6984 mg daily are helpful to improve this.  Rechecking your fasting cholesterol panel in 6 months is recommended (Lipid w/ LDL reflex, DX: hyperlipidemia)  The 10-year ASCVD risk score (Clementineandreea RIZZO Jr, et al., 2013) is: 2.2%    Values used to calculate the score:      Age: 52 years      Sex: Female      Is Non- : No      Diabetic: No      Tobacco smoker: No      Systolic Blood Pressure: 120 mmHg      Is BP treated: No      HDL Cholesterol: 49 mg/dL      Total Cholesterol: 245 mg/dL    -TSH (thyroid stimulating hormone) level is normal which indicates normal thyroid function..

## 2017-11-22 ENCOUNTER — OFFICE VISIT (OUTPATIENT)
Dept: URGENT CARE | Facility: URGENT CARE | Age: 52
End: 2017-11-22
Payer: COMMERCIAL

## 2017-11-22 ENCOUNTER — RADIANT APPOINTMENT (OUTPATIENT)
Dept: MAMMOGRAPHY | Facility: CLINIC | Age: 52
End: 2017-11-22
Attending: FAMILY MEDICINE
Payer: COMMERCIAL

## 2017-11-22 VITALS
WEIGHT: 153 LBS | BODY MASS INDEX: 28.91 KG/M2 | DIASTOLIC BLOOD PRESSURE: 73 MMHG | OXYGEN SATURATION: 98 % | HEART RATE: 72 BPM | TEMPERATURE: 98.7 F | SYSTOLIC BLOOD PRESSURE: 123 MMHG | RESPIRATION RATE: 18 BRPM

## 2017-11-22 DIAGNOSIS — Z12.31 ENCOUNTER FOR SCREENING MAMMOGRAM FOR BREAST CANCER: ICD-10-CM

## 2017-11-22 DIAGNOSIS — M62.838 MUSCLE SPASM: ICD-10-CM

## 2017-11-22 DIAGNOSIS — Z12.31 VISIT FOR SCREENING MAMMOGRAM: ICD-10-CM

## 2017-11-22 DIAGNOSIS — L08.9 LOCAL INFECTION OF SKIN AND SUBCUTANEOUS TISSUE: Primary | ICD-10-CM

## 2017-11-22 PROCEDURE — 99214 OFFICE O/P EST MOD 30 MIN: CPT | Performed by: PHYSICIAN ASSISTANT

## 2017-11-22 PROCEDURE — G0202 SCR MAMMO BI INCL CAD: HCPCS | Mod: TC

## 2017-11-22 RX ORDER — CYCLOBENZAPRINE HCL 5 MG
TABLET ORAL
Qty: 30 TABLET | Refills: 0 | Status: SHIPPED | OUTPATIENT
Start: 2017-11-22 | End: 2019-10-02

## 2017-11-22 RX ORDER — CEFDINIR 300 MG/1
300 CAPSULE ORAL 2 TIMES DAILY
Qty: 20 CAPSULE | Refills: 0 | Status: SHIPPED | OUTPATIENT
Start: 2017-11-22 | End: 2018-10-29

## 2017-11-22 RX ORDER — IBUPROFEN 800 MG/1
800 TABLET, FILM COATED ORAL EVERY 8 HOURS PRN
Qty: 30 TABLET | Refills: 1 | Status: SHIPPED | OUTPATIENT
Start: 2017-11-22 | End: 2019-07-18

## 2017-11-22 NOTE — PATIENT INSTRUCTIONS
(L08.9) Local infection of skin and subcutaneous tissue  (primary encounter diagnosis)  Comment: of skin on right upper eye lid.    Plan: cefdinir (OMNICEF) 300 MG capsule     You may also apply a small amount of bacitracin to the scabbed lesion twice a day    (M62.838) Muscle spasm  Comment: in right upper back, contributing to your right sided [id number]  Plan: cyclobenzaprine (FLEXERIL) 5 MG tablet,         ibuprofen (ADVIL/MOTRIN) 800 MG tablet        You may try moist heat to the area for 20 minutes a few time a day, followed by gentle stretches      Follow up with primary clinic should symptoms persist or worsen.

## 2017-11-22 NOTE — MR AVS SNAPSHOT
After Visit Summary   11/22/2017    Charu Contreras    MRN: [id number]           Patient Information     Date Of Birth          1965        Visit Information        Provider Department      11/22/2017 10:10 AM Nicki Maurice PA-C [facility]        Today's Diagnoses     Local infection of skin and subcutaneous tissue    -  1    Muscle spasm          Care Instructions    (L08.9) Local infection of skin and subcutaneous tissue  (primary encounter diagnosis)  Comment: of skin on right upper eye lid.    Plan: cefdinir (OMNICEF) 300 MG capsule     You may also apply a small amount of bacitracin to the scabbed lesion twice a day    (M62.838) Muscle spasm  Comment: in right upper back, contributing to your right sided [id number]  Plan: cyclobenzaprine (FLEXERIL) 5 MG tablet,         ibuprofen (ADVIL/MOTRIN) 800 MG tablet        You may try moist heat to the area for 20 minutes a few time a day, followed by gentle stretches      Follow up with primary clinic should symptoms persist or worsen.                Follow-ups after your visit        Your next 10 appointments already scheduled     Nov 29, 2017  9:30 AM CST   Ech Stress Test with [id number]   [facility] [place] CV Echocardiography (Cardiovascular Imaging at [facility])    [address] [place] [address] [phone]           1. Please bring or wear a comfortable two-piece outfit and walking shoes. 2. Stop eating 3 hours before the test. You may drink water or juice. 3. Stop all caffeine 12 hours before the test. This includes coffee, tea, soda pop, chocolate and certain medicines (such as Anacin and Excederin). Also avoid decaf coffee and tea, as these contain small amounts of caffeine. 4. No alcohol, smoking or use of other tobacco products for 12 hours before the test. 5. Refer to your provider instructions to see if you need to stop any medications (such as  "beta-blockers or nitrates) for this test. 6. For patients with diabetes: - If you take insulin, call your diabetes care team. Ask if you should take a   dose the morning of your test. - If you take diabetes medicine by mouth, dont take it on the morning of your test. Bring it with you to take after the test. (If you have questions, call your diabetes care team) 7. When you arrive, please tell us if: - You have diabetes. - You have taken Viagra, Cialis or Levitra in the past 48 hours. 8. For any questions that cannot be answered, please contact the ordering physician              Who to contact     If you have questions or need follow up information about today's clinic visit or your schedule please contact [facility] directly at [phone].  Normal or non-critical lab and imaging results will be communicated to you by Zoraphart, letter or phone within 4 business days after the clinic has received the results. If you do not hear from us within 7 days, please contact the clinic through Zoraphart or phone. If you have a critical or abnormal lab result, we will notify you by phone as soon as possible.  Submit refill requests through MyWebGrocer or call your pharmacy and they will forward the refill request to us. Please allow 3 business days for your refill to be completed.          Additional Information About Your Visit        MyWebGrocer Information     MyWebGrocer lets you send messages to your doctor, view your test results, renew your prescriptions, schedule appointments and more. To sign up, go to [email or web address] . Click on \"Log in\" on the left side of the screen, which will take you to the Welcome page. Then click on \"Sign up Now\" on the right side of the page.     You will be asked to enter the access code listed below, as well as some personal information. Please follow the directions to create your username and password.     Your access code is: [email or web address]  Expires: 2/8/2018 10:24 " AM     Your access code will  in 90 days. If you need help or a new code, please call your [facility] clinic or [phone].        Care EveryWhere ID     This is your Care EveryWhere ID. This could be used by other organizations to access your [facility] medical records  [id number]        Your Vitals Were     Pulse Temperature Respirations Pulse Oximetry BMI (Body Mass Index)       72 98.7  F (37.1  C) (Oral) 18 98% 28.91 kg/m2        Blood Pressure from Last 3 Encounters:   17 123/73   17 120/84   17 135/73    Weight from Last 3 Encounters:   17 153 lb (69.4 kg)   17 158 lb 8 oz (71.9 kg)   17 165 lb (74.8 kg)              Today, you had the following     No orders found for display         Today's Medication Changes          These changes are accurate as of: 17 11:31 AM.  If you have any questions, ask your nurse or doctor.               Start taking these medicines.        Dose/Directions    cefdinir 300 MG capsule   Commonly known as:  OMNICEF   Used for:  Local infection of skin and subcutaneous tissue   Started by:  Nciki Maurice PA-C        Dose:  300 mg   Take 1 capsule (300 mg) by mouth 2 times daily   Quantity:  20 capsule   Refills:  0       cyclobenzaprine 5 MG tablet   Commonly known as:  FLEXERIL   Used for:  Muscle spasm   Started by:  Nicki Maurice PA-C        Take 1 tablet po QHS prn or Q 8 hours prn   Quantity:  30 tablet   Refills:  0       ibuprofen 800 MG tablet   Commonly known as:  ADVIL/MOTRIN   Used for:  Muscle spasm   Started by:  Nicki Maurice PA-C        Dose:  800 mg   Take 1 tablet (800 mg) by mouth every 8 hours as needed for moderate pain   Quantity:  30 tablet   Refills:  1            Where to get your medicines      These medications were sent to [facility] [address] [address]     Phone:  [phone]     cefdinir 300 MG  capsule    cyclobenzaprine 5 MG tablet    ibuprofen 800 MG tablet                Primary Care Provider    Physician No Ref-Primary       NO REF-PRIMARY PHYSICIAN        Equal Access to Services     AVELINA KEYES : Hadii chilo cespedes chica Reddy, mckennada lutaon, kenny jeong, cornelio drummond orlandoelvira maldonado laMariscarroll perez. So [facility] [phone].    ATENCIÓN: Si habla español, tiene a severino disposición servicios gratuitos de asistencia lingüística. Llame al [phone].    We comply with applicable federal civil rights laws and [place] laws. We do not discriminate on the basis of race, color, national origin, age, disability, sex, sexual orientation, or gender identity.            Thank you!     Thank you for choosing [facility]  for your care. Our goal is always to provide you with excellent care. Hearing back from our patients is one way we can continue to improve our services. Please take a few minutes to complete the written survey that you may receive in the mail after your visit with us. Thank you!             Your Updated Medication List - Protect others around you: Learn how to safely use, store and throw away your medicines at www.disposemymeds.org.          This list is accurate as of: 11/22/17 11:31 AM.  Always use your most recent med list.                   Brand Name Dispense Instructions for use Diagnosis    cefdinir 300 MG capsule    OMNICEF    20 capsule    Take 1 capsule (300 mg) by mouth 2 times daily    Local infection of skin and subcutaneous tissue       clobetasol 0.05 % cream    TEMOVATE    30 g    Apply sparingly to affected area twice daily as needed    Dermatitis       cyclobenzaprine 5 MG tablet    FLEXERIL    30 tablet    Take 1 tablet po QHS prn or Q 8 hours prn    Muscle spasm       GLUCOSAMINE SULFATE PO           ibuprofen 800 MG tablet    ADVIL/MOTRIN    30 tablet    Take 1 tablet (800 mg) by mouth every 8 hours as needed for moderate pain    Muscle  spasm       MULTI VITAMIN PO      Take by mouth daily        VITAMIN D (CHOLECALCIFEROL) PO      Take by mouth daily

## 2017-11-22 NOTE — LETTER
November 27, 2017      Charu Contreras  [address]  [place] [address]        Dear ,    We are writing to inform you of your test results.    Results within acceptable limits.  -Mammogram was normal.  ADVISE: rechecking in 1 year..    Resulted Orders   MA Screening Digital Bilateral    Narrative    Examination: Bilateral digital screening mammography with computer  aided detection, 11/22/2017 10:25 AM.    Comparison: 6/05/2014    History: No current breast concerns.    BREAST DENSITY: Scattered fibroglandular densities.    COMMENTS:  No suspicious finding.      Impression    IMPRESSION: BI-RADS CATEGORY: 1 -  NEGATIVE.    RECOMMENDED FOLLOW-UP: Annual Mammography.      The patient will be notified of the results.     JO CARRERA MD       If you have any questions or concerns, please call the clinic at the number listed above.     Sincerely,  Lorrie Floyd MD/nr

## 2017-11-22 NOTE — PROGRESS NOTES
SUBJECTIVE:   Charu Contreras is a 52 year old female presenting with a chief complaint of   1) right sided headache for the past week.  Onset was prior to her physical on 11/171/17.  2) right sided ear pain for the past week  3) some runny nose and congestion.  4) cough ongoing for 2 weeks, seems to be improving.    No noted fevers  5) noticed a small pimple on her right upper eyelid about a week ago.      Onset of symptoms was as above    6) She is also concerned about the recent lab letter from her PCP wherein she is told that her cholesterol is high.  She wants to know how bad this is and what she should do.      Current and Associated symptoms: as above  Treatment measures tried include None tried.  Predisposing factors include None.    History reviewed. No pertinent past medical history.  Patient Active Problem List   Diagnosis     Hyperlipidemia, unspecified hyperlipidemia type     Obesity, unspecified obesity severity, unspecified obesity type     Vitamin D deficiency     Mild major depression (H)     Social History   Substance Use Topics     Smoking status: Never Smoker     Smokeless tobacco: Never Used     Alcohol use No       ROS:  CONSTITUTIONAL:NEGATIVE for fever, chills, change in weight  INTEGUMENTARY/SKIN: as per HPI  EYES: NEGATIVE for vision changes or irritation  ENT/MOUTH: as per HPI  RESP:NEGATIVE for significant cough or SOB  CV: NEGATIVE for chest pain, palpitations or peripheral edema  GI: NEGATIVE for nausea, abdominal pain, heartburn, or change in bowel habits  : normal menstrual cycles  MUSCULOSKELETAL: as per HPI  NEURO: NEGATIVE for weakness, dizziness or paresthesias    OBJECTIVE  :/73  Pulse 72  Temp 98.7  F (37.1  C) (Oral)  Resp 18  Wt 153 lb (69.4 kg)  SpO2 98%  BMI 28.91 kg/m2  GENERAL APPEARANCE: healthy, alert and no distress  EYES: EOMI,  PERRL, conjunctiva clear.    HENT: ear canals and TM's normal.  Nose and mouth without ulcers, erythema or lesions  NECK:  supple, nontender, no lymphadenopathy  UPPER BACK: tenderness over right trapezius with palpable muscle spasm.    RESP: lungs clear to auscultation - no rales, rhonchi or wheezes  CV: regular rates and rhythm, normal S1 S2, no murmur noted  ABDOMEN:  soft, nontender, no HSM or masses and bowel sounds normal  NEURO: Normal strength and tone, sensory exam grossly normal,  normal speech and mentation  SKIN: erythematous scabbed lesion on right upper eyelid at medial aspect.  NO vesicles.      (L08.9) Local infection of skin and subcutaneous tissue  (primary encounter diagnosis)  Comment: of skin on right upper eye lid.    Plan: cefdinir (OMNICEF) 300 MG capsule     You may also apply a small amount of bacitracin to the scabbed lesion twice a day    (M62.838) Muscle spasm  Comment: in right upper back, contributing to your right sided [id number]  Plan: cyclobenzaprine (FLEXERIL) 5 MG tablet,         ibuprofen (ADVIL/MOTRIN) 800 MG tablet        You may try moist heat to the area for 20 minutes a few time a day, followed by gentle stretches      Follow up with primary clinic should symptoms persist or worsen.      Also discussed patient's letter from her PCP with her elevated cholesterol numbers, and the suggestions from her PCP.      In addition, she is interested in acupuncture through [facility] for back symptoms.  I have advised her to discuss this with her PCP.       Overall, Greater than 50% of the 40 minutes spent face to face with patient was discussing and reviewing the results of diagnostic tests, discussing risks and benefits of management (treatment) options, instruction for management and follow up and importance of compliance with treatment.      Patient expresses understanding and agreement with the assessment and plan as above.

## 2017-11-22 NOTE — NURSING NOTE
"Chief Complaint   Patient presents with     Headache     Rt side   Rt side of head for 1 week    Initial /73  Pulse 72  Temp 98.7  F (37.1  C) (Oral)  Resp 18  Wt 153 lb (69.4 kg)  SpO2 98%  BMI 28.91 kg/m2 Estimated body mass index is 28.91 kg/(m^2) as calculated from the following:    Height as of 11/16/17: 5' 1\" (1.549 m).    Weight as of this encounter: 153 lb (69.4 kg).  Blood pressure completed using cuff size: regular    "

## 2017-11-24 ENCOUNTER — APPOINTMENT (OUTPATIENT)
Dept: MRI IMAGING | Facility: CLINIC | Age: 52
End: 2017-11-24
Attending: INTERNAL MEDICINE
Payer: COMMERCIAL

## 2017-11-24 ENCOUNTER — HOSPITAL ENCOUNTER (EMERGENCY)
Facility: CLINIC | Age: 52
Discharge: HOME | End: 2017-11-24
Attending: INTERNAL MEDICINE | Admitting: INTERNAL MEDICINE
Payer: COMMERCIAL

## 2017-11-24 VITALS
BODY MASS INDEX: 30.48 KG/M2 | HEART RATE: 71 BPM | RESPIRATION RATE: 18 BRPM | WEIGHT: 161.3 LBS | DIASTOLIC BLOOD PRESSURE: 78 MMHG | TEMPERATURE: 98.2 F | SYSTOLIC BLOOD PRESSURE: 148 MMHG | OXYGEN SATURATION: 99 %

## 2017-11-24 DIAGNOSIS — L03.211 FACIAL CELLULITIS: ICD-10-CM

## 2017-11-24 DIAGNOSIS — R21 RASH: ICD-10-CM

## 2017-11-24 LAB
ALBUMIN SERPL-MCNC: 3.4 G/DL (ref 3.4–5)
ALP SERPL-CCNC: 151 U/L (ref 40–150)
ALT SERPL W P-5'-P-CCNC: 32 U/L (ref 0–50)
ANION GAP SERPL CALCULATED.3IONS-SCNC: 7 MMOL/L (ref 3–14)
AST SERPL W P-5'-P-CCNC: 25 U/L (ref 0–45)
BASOPHILS # BLD AUTO: 0 10E9/L (ref 0–0.2)
BASOPHILS NFR BLD AUTO: 0.4 %
BILIRUB SERPL-MCNC: 0.3 MG/DL (ref 0.2–1.3)
BUN SERPL-MCNC: 11 MG/DL (ref 7–30)
CALCIUM SERPL-MCNC: 9.9 MG/DL (ref 8.5–10.1)
CHLORIDE SERPL-SCNC: 109 MMOL/L (ref 94–109)
CO2 SERPL-SCNC: 26 MMOL/L (ref 20–32)
CREAT SERPL-MCNC: 0.65 MG/DL (ref 0.52–1.04)
CRP SERPL-MCNC: <2.9 MG/L (ref 0–8)
DIFFERENTIAL METHOD BLD: NORMAL
EOSINOPHIL # BLD AUTO: 0.3 10E9/L (ref 0–0.7)
EOSINOPHIL NFR BLD AUTO: 3.4 %
ERYTHROCYTE [DISTWIDTH] IN BLOOD BY AUTOMATED COUNT: 13 % (ref 10–15)
ERYTHROCYTE [SEDIMENTATION RATE] IN BLOOD BY WESTERGREN METHOD: 25 MM/H (ref 0–30)
GFR SERPL CREATININE-BSD FRML MDRD: >90 ML/MIN/1.7M2
GLUCOSE SERPL-MCNC: 122 MG/DL (ref 70–99)
HCG SERPL QL: NEGATIVE
HCT VFR BLD AUTO: 38.6 % (ref 35–47)
HGB BLD-MCNC: 12.7 G/DL (ref 11.7–15.7)
IMM GRANULOCYTES # BLD: 0 10E9/L (ref 0–0.4)
IMM GRANULOCYTES NFR BLD: 0.4 %
INR PPP: 0.84 (ref 0.86–1.14)
LYMPHOCYTES # BLD AUTO: 2.5 10E9/L (ref 0.8–5.3)
LYMPHOCYTES NFR BLD AUTO: 33 %
MCH RBC QN AUTO: 30 PG (ref 26.5–33)
MCHC RBC AUTO-ENTMCNC: 32.9 G/DL (ref 31.5–36.5)
MCV RBC AUTO: 91 FL (ref 78–100)
MONOCYTES # BLD AUTO: 0.6 10E9/L (ref 0–1.3)
MONOCYTES NFR BLD AUTO: 7.9 %
NEUTROPHILS # BLD AUTO: 4.1 10E9/L (ref 1.6–8.3)
NEUTROPHILS NFR BLD AUTO: 54.9 %
NRBC # BLD AUTO: 0 10*3/UL
NRBC BLD AUTO-RTO: 0 /100
PLATELET # BLD AUTO: 274 10E9/L (ref 150–450)
POTASSIUM SERPL-SCNC: 4.2 MMOL/L (ref 3.4–5.3)
PROT SERPL-MCNC: 7.5 G/DL (ref 6.8–8.8)
RBC # BLD AUTO: 4.24 10E12/L (ref 3.8–5.2)
SODIUM SERPL-SCNC: 142 MMOL/L (ref 133–144)
WBC # BLD AUTO: 7.6 10E9/L (ref 4–11)

## 2017-11-24 PROCEDURE — 85025 COMPLETE CBC W/AUTO DIFF WBC: CPT | Performed by: INTERNAL MEDICINE

## 2017-11-24 PROCEDURE — 25000128 H RX IP 250 OP 636: Performed by: INTERNAL MEDICINE

## 2017-11-24 PROCEDURE — A9585 GADOBUTROL INJECTION: HCPCS | Performed by: INTERNAL MEDICINE

## 2017-11-24 PROCEDURE — 85652 RBC SED RATE AUTOMATED: CPT | Performed by: INTERNAL MEDICINE

## 2017-11-24 PROCEDURE — 86140 C-REACTIVE PROTEIN: CPT | Performed by: INTERNAL MEDICINE

## 2017-11-24 PROCEDURE — 99285 EMERGENCY DEPT VISIT HI MDM: CPT | Mod: 25 | Performed by: INTERNAL MEDICINE

## 2017-11-24 PROCEDURE — 84703 CHORIONIC GONADOTROPIN ASSAY: CPT | Performed by: INTERNAL MEDICINE

## 2017-11-24 PROCEDURE — 96360 HYDRATION IV INFUSION INIT: CPT | Performed by: INTERNAL MEDICINE

## 2017-11-24 PROCEDURE — 70546 MR ANGIOGRAPH HEAD W/O&W/DYE: CPT

## 2017-11-24 PROCEDURE — 85610 PROTHROMBIN TIME: CPT | Performed by: INTERNAL MEDICINE

## 2017-11-24 PROCEDURE — 99284 EMERGENCY DEPT VISIT MOD MDM: CPT | Mod: Z6 | Performed by: INTERNAL MEDICINE

## 2017-11-24 PROCEDURE — 70553 MRI BRAIN STEM W/O & W/DYE: CPT

## 2017-11-24 PROCEDURE — 80053 COMPREHEN METABOLIC PANEL: CPT | Performed by: INTERNAL MEDICINE

## 2017-11-24 RX ORDER — GADOBUTROL 604.72 MG/ML
10 INJECTION INTRAVENOUS ONCE
Status: COMPLETED | OUTPATIENT
Start: 2017-11-24 | End: 2017-11-24

## 2017-11-24 RX ORDER — DIAPER,BRIEF,INFANT-TODD,DISP
EACH MISCELLANEOUS
Qty: 30 G | Refills: 0 | Status: SHIPPED | OUTPATIENT
Start: 2017-11-24 | End: 2019-07-18

## 2017-11-24 RX ORDER — SULFAMETHOXAZOLE/TRIMETHOPRIM 800-160 MG
1 TABLET ORAL 2 TIMES DAILY
Qty: 20 TABLET | Refills: 0 | Status: SHIPPED | OUTPATIENT
Start: 2017-11-24 | End: 2017-12-04

## 2017-11-24 RX ADMIN — GADOBUTROL 10 ML: 604.72 INJECTION INTRAVENOUS at 20:02

## 2017-11-24 RX ADMIN — SODIUM CHLORIDE 30 ML: 900 INJECTION, SOLUTION INTRAVENOUS at 20:03

## 2017-11-24 ASSESSMENT — ENCOUNTER SYMPTOMS
SPEECH DIFFICULTY: 0
CONFUSION: 0
ADENOPATHY: 0
NECK STIFFNESS: 0
HEADACHES: 1
NECK PAIN: 0
ABDOMINAL PAIN: 0
FEVER: 0
NUMBNESS: 0
BACK PAIN: 0
CHILLS: 0
COUGH: 0
SHORTNESS OF BREATH: 0
WEAKNESS: 0

## 2017-11-24 NOTE — ED AVS SNAPSHOT
[facility], Emergency Department    [address]    [place] [address]    Phone:  [phone]    Fax:  [phone]                                       Charu Contreras   MRN: [id number]    Department:  [facility], Emergency Department   Date of Visit:  11/24/2017           Patient Information     Date Of Birth          1965        Your diagnoses for this visit were:     Rash     Facial cellulitis        You were seen by Esdras Murillo MD.      Follow-up Information     Follow up with Clinic, [facility].    Specialty:  Clinic    Contact information:    [address] [facility] [address]  [phone]          Discharge Instructions       Continue the Omnicef (cefdinir).  Continue clobetasol to the shin and wrists.  Add Bactrim DS 1 tablet twice/ day for 10 days.  Hydrocortisone ointment to the eyebrow 3 times/ day.  Follow up Dermatology Clinic [phone] next week. They should contact you Monday to schedule.    Future Appointments        Provider Department Dept Phone Center    11/29/2017 9:30 AM [facility] ECHO ROOM 1 [facility] CV Echocardiography [phone] CVIMG      24 Hour Appointment Hotline       To make an appointment at any [facility] clinic, call [phone] ([phone]). If you don't have a family doctor or clinic, we will help you find one. [facility] clinics are conveniently located to serve the needs of you and your family.             Review of your medicines      START taking        Dose / Directions Last dose taken    hydrocortisone 1 % ointment   Quantity:  30 g        Apply sparingly to affected area three times daily for 14 days.   Refills:  0        sulfamethoxazole-trimethoprim 800-160 MG per tablet   Commonly known as:  BACTRIM DS   Dose:  1 tablet   Quantity:  20 tablet        Take 1 tablet by mouth 2 times daily for 10 days   Refills:  0          Our records show that you are taking the medicines listed below.  If these are incorrect, please call your family doctor or clinic.        Dose / Directions Last dose taken    cefdinir 300 MG capsule   Commonly known as:  OMNICEF   Dose:  300 mg   Quantity:  20 capsule        Take 1 capsule (300 mg) by mouth 2 times daily   Refills:  0        clobetasol 0.05 % cream   Commonly known as:  TEMOVATE   Quantity:  30 g        Apply sparingly to affected area twice daily as needed   Refills:  0        cyclobenzaprine 5 MG tablet   Commonly known as:  FLEXERIL   Quantity:  30 tablet        Take 1 tablet po QHS prn or Q 8 hours prn   Refills:  0        GLUCOSAMINE SULFATE PO        Refills:  0        ibuprofen 800 MG tablet   Commonly known as:  ADVIL/MOTRIN   Dose:  800 mg   Quantity:  30 tablet        Take 1 tablet (800 mg) by mouth every 8 hours as needed for moderate pain   Refills:  1        MULTI VITAMIN PO        Take by mouth daily   Refills:  0        VITAMIN D (CHOLECALCIFEROL) PO        Take by mouth daily   Refills:  0                Prescriptions were sent or printed at these locations (2 Prescriptions)                   Other Prescriptions                Printed at Department/Unit printer (2 of 2)         sulfamethoxazole-trimethoprim (BACTRIM DS) 800-160 MG per tablet               hydrocortisone 1 % ointment                Procedures and tests performed during your visit     CBC with platelets differential    CRP inflammation    Comprehensive metabolic panel    Erythrocyte sedimentation rate auto    HCG qualitative pregnancy (blood)    INR    MR Brain w/o & w Contrast    MRA Brain Venogram w&wo Contrast    Saline Lock IV      Orders Needing Specimen Collection     None      Pending Results     No orders found from 11/22/2017 to 11/25/2017.            Pending Culture Results     No orders found from 11/22/2017 to 11/25/2017.            Pending Results Instructions     If you had any lab results that were not finalized at the time of your Discharge, you can call the ED Lab  "Result RN at [phone]. You will be contacted by this team for any positive Lab results or changes in treatment. The nurses are available 7 days a week from 10A to 6:30P.  You can leave a message 24 hours per day and they will return your call.        Thank you for choosing [facility]       Thank you for choosing [facility] for your care. Our goal is always to provide you with excellent care. Hearing back from our patients is one way we can continue to improve our services. Please take a few minutes to complete the written survey that you may receive in the mail after you visit with us. Thank you!        [employer] Information     Admaxim lets you send messages to your doctor, view your test results, renew your prescriptions, schedule appointments and more. To sign up, go to [email or web address] . Click on \"Log in\" on the left side of the screen, which will take you to the Welcome page. Then click on \"Sign up Now\" on the right side of the page.     You will be asked to enter the access code listed below, as well as some personal information. Please follow the directions to create your username and password.     Your access code is: [email or web address]  Expires: 2018 10:24 AM     Your access code will  in 90 days. If you need help or a new code, please call your [facility] clinic or [phone].        Care EveryWhere ID     This is your Care EveryWhere ID. This could be used by other organizations to access your [facility] medical records  [id number]        Equal Access to Services     AVELINA KEYES : Hadii chilo correiao Sostaciali, waaxda luqadaha, qaybta kaalmada adeelvirayachauncey, cornelio mcmahon . So [facility] [phone].    ATENCIÓN: Si habla español, tiene a severino disposición servicios gratuitos de asistencia lingüística. Llame al [phone].    We comply with applicable federal civil rights laws and [place] laws. We do not discriminate on the basis of race, color, national origin, age, " disability, sex, sexual orientation, or gender identity.            After Visit Summary       This is your record. Keep this with you and show to your community pharmacist(s) and doctor(s) at your next visit.

## 2017-11-24 NOTE — ED AVS SNAPSHOT
[facility] [place], Emergency Department    [address]    [place] [address]    Phone:  [phone]    Fax:  [phone]                                       Charu Contreras   MRN: [id number]    Department:  [facility], Emergency Department   Date of Visit:  11/24/2017           After Visit Summary Signature Page     I have received my discharge instructions, and my questions have been answered. I have discussed any challenges I see with this plan with the nurse or doctor.    ..........................................................................................................................................  Patient/Patient Representative Signature      ..........................................................................................................................................  Patient Representative Print Name and Relationship to Patient    ..................................................               ................................................  Date                                            Time    ..........................................................................................................................................  Reviewed by Signature/Title    ...................................................              ..............................................  Date                                                            Time

## 2017-11-25 NOTE — ED PROVIDER NOTES
History     Chief Complaint   Patient presents with     Rash     itchy rash scattered on forearms/wrists; right inner upper eyelid and lower legs; was seen at urgent care on Wednesday, given medicine but it is not working: cefdinir and bacitracin;      Headache     frontal area and top of head; taking Tylenol but it helps only a little bit; no nausea; denies photosensitivity; headache started 1 week ago;      HPI  Charu Contreras is a 52 year old female who has had a chronic rash of her right shin and volar wrists for about 1 year. She has recently developed rash and swelling of the right upper lid with swelling, pain on eye movement and right frontotemporal headache. She has no fever, chills or sweats. The rash does itch. She has no URI symptoms, cough, shortness of breath, neck stiffness, numbness, weakness or visual change.    PAST MEDICAL HISTORY: History reviewed. No pertinent past medical history.    PAST SURGICAL HISTORY:   Past Surgical History:   Procedure Laterality Date     TUBAL LIGATION         FAMILY HISTORY:   Family History   Problem Relation Age of Onset     DIABETES Son      Glaucoma No family hx of      Macular Degeneration No family hx of        SOCIAL HISTORY:   Social History   Substance Use Topics     Smoking status: Never Smoker     Smokeless tobacco: Never Used     Alcohol use No         I have reviewed the Medications, Allergies, Past Medical and Surgical History, and Social History in the Epic system.    Review of Systems   Constitutional: Negative for chills and fever.   HENT: Negative for congestion.    Eyes: Negative for visual disturbance.   Respiratory: Negative for cough and shortness of breath.    Cardiovascular: Negative for chest pain.   Gastrointestinal: Negative for abdominal pain.   Musculoskeletal: Negative for back pain, neck pain and neck stiffness.   Neurological: Positive for headaches. Negative for speech difficulty, weakness and numbness.   Hematological:  Negative for adenopathy.   Psychiatric/Behavioral: Negative for confusion.       Physical Exam   BP: 129/62  Pulse: 71  Heart Rate: 70  Temp: 97.5  F (36.4  C)  Resp: 16  Weight: 73.2 kg (161 lb 4.8 oz)  SpO2: 96 %      Physical Exam   Constitutional: She is oriented to person, place, and time. She appears well-developed and well-nourished. No distress.   HENT:   Head: Normocephalic and atraumatic.   Right Ear: External ear normal.   Left Ear: External ear normal.   Nose: Nose normal.   Mouth/Throat: Oropharynx is clear and moist. No oropharyngeal exudate.   Eyes: EOM are normal. Pupils are equal, round, and reactive to light. Right eye exhibits no chemosis. Left eye exhibits no chemosis. Right conjunctiva is not injected. Left conjunctiva is not injected. No scleral icterus.       Neck: Normal range of motion. Neck supple.   Cardiovascular: Normal rate, regular rhythm and normal heart sounds.    No murmur heard.  Pulmonary/Chest: Effort normal and breath sounds normal.   Musculoskeletal: She exhibits no edema.   Neurological: She is alert and oriented to person, place, and time.   Skin: Rash noted.        Psychiatric: She has a normal mood and affect. Her behavior is normal.   Nursing note and vitals reviewed.      ED Course     ED Course     Procedures        Labs/Imaging    Results for orders placed or performed during the hospital encounter of 11/24/17 (from the past 24 hour(s))   CBC with platelets differential   Result Value Ref Range    WBC 7.6 4.0 - 11.0 10e9/L    RBC Count 4.24 3.8 - 5.2 10e12/L    Hemoglobin 12.7 11.7 - 15.7 g/dL    Hematocrit 38.6 35.0 - 47.0 %    MCV 91 78 - 100 fl    MCH 30.0 26.5 - 33.0 pg    MCHC 32.9 31.5 - 36.5 g/dL    RDW 13.0 10.0 - 15.0 %    Platelet Count 274 150 - 450 10e9/L    Diff Method Automated Method     % Neutrophils 54.9 %    % Lymphocytes 33.0 %    % Monocytes 7.9 %    % Eosinophils 3.4 %    % Basophils 0.4 %    % Immature Granulocytes 0.4 %    Nucleated RBCs 0 0 /100     Absolute Neutrophil 4.1 1.6 - 8.3 10e9/L    Absolute Lymphocytes 2.5 0.8 - 5.3 10e9/L    Absolute Monocytes 0.6 0.0 - 1.3 10e9/L    Absolute Eosinophils 0.3 0.0 - 0.7 10e9/L    Absolute Basophils 0.0 0.0 - 0.2 10e9/L    Abs Immature Granulocytes 0.0 0 - 0.4 10e9/L    Absolute Nucleated RBC 0.0    Comprehensive metabolic panel   Result Value Ref Range    Sodium 142 133 - 144 mmol/L    Potassium 4.2 3.4 - 5.3 mmol/L    Chloride 109 94 - 109 mmol/L    Carbon Dioxide 26 20 - 32 mmol/L    Anion Gap 7 3 - 14 mmol/L    Glucose 122 (H) 70 - 99 mg/dL    Urea Nitrogen 11 7 - 30 mg/dL    Creatinine 0.65 0.52 - 1.04 mg/dL    GFR Estimate >90 >60 mL/min/1.7m2    GFR Estimate If Black >90 >60 mL/min/1.7m2    Calcium 9.9 8.5 - 10.1 mg/dL    Bilirubin Total 0.3 0.2 - 1.3 mg/dL    Albumin 3.4 3.4 - 5.0 g/dL    Protein Total 7.5 6.8 - 8.8 g/dL    Alkaline Phosphatase 151 (H) 40 - 150 U/L    ALT 32 0 - 50 U/L    AST 25 0 - 45 U/L   CRP inflammation   Result Value Ref Range    CRP Inflammation <2.9 0.0 - 8.0 mg/L   Erythrocyte sedimentation rate auto   Result Value Ref Range    Sed Rate 25 0 - 30 mm/h   INR   Result Value Ref Range    INR 0.84 (L) 0.86 - 1.14   HCG qualitative pregnancy (blood)   Result Value Ref Range    HCG Qualitative Serum Negative NEG^Negative   MRA Brain Venogram w&wo Contrast    Narrative    MR VENOGRAM OF THE HEAD WITHOUT AND WITH CONTRAST  11/24/2017 8:01 PM     HISTORY: Right orbital infection, headache. Evaluate for venous sinus  thrombosis.    TECHNIQUE: 2D TOF and 2D phase contrast MR venogram without contrast  material. 3D TOF MR venogram with 10mL IV Gadavist.    COMPARISON: None.  sinus  FINDINGS: The superior sagittal sinus, transverse sinus, and sigmoid  sinus appear patent. The straight sinus and deep venous sinuses appear  patent. No definite abnormal enlargement or signal abnormality within  the cavernous sinus.      Impression    IMPRESSION: Normal MR venogram of the head.    ANDRÉS KERNS MD    MR Brain w/o & w Contrast    Narrative    MRI BRAIN WITHOUT AND WITH CONTRAST  11/24/2017 8:03 PM    HISTORY: Right orbital infection, headache, evaluate for venous sinus  thrombosis, orbital cellulitis.    TECHNIQUE: Multiplanar, multisequence MRI of the brain without and  with 10 mL IV Gadavist.    COMPARISON: None.    FINDINGS: There is no evidence of hemorrhage, mass, acute infarct, or  anomaly. The brain parenchyma, ventricles and subarachnoid spaces  appear normal. Mild burden of patchy deep and subcortical white matter  T2 hyperintensities which are nonspecific, but likely related to  chronic microvascular ischemic disease. There are no gadolinium  enhancing lesions.     The right superior ophthalmic vein does not appear to be enlarged. No  definite intraorbital inflammation. The cavernous sinuses do not  appear to be enlarged.    There are prominent bilateral cervical chain lymph nodes which are  only partially visualized right greater than left. The arteries at the  base of the brain and the dural venous sinuses appear patent.       Impression    IMPRESSION:    1. No evidence of acute infarct, mass, hemorrhage, or herniation.  2. No definite intraorbital inflammation. The superior ophthalmic vein  is not enlarged and the cavernous sinuses are not enlarged.  3. Mild patchy white matter changes which are nonspecific, but likely  related to chronic microvascular ischemic disease.  4. Prominent bilateral cervical chain lymph nodes right greater than  left.      ANDRÉS KERNS MD         Assessments & Plan (with Medical Decision Making)   Impression:  Middle aged female has a history of chronic rash on her shin and wrists. She has developed a similar rash on her right upper lid. There is a small excoriated area near the medial canthus and cellulitis of the right upper lid and brow associated with lymphadenopathy in the right anterior neck. The underlying rash is of uncertain cause, possibly atopic. She does not  have evidence of orbital cellulitis or intracranial venous thrombosis basd on labs and MRI. Her preseptal cellulitis has not responded to 3 days of cefdinir. I will add Bactrim for coverage of staph. I contacted the dermatologist on call to facilitate early follow up in derm clinic.    I have reviewed the nursing notes.    I have reviewed the findings, diagnosis, plan and need for follow up with the patient.    Discharge Medication List as of 11/24/2017  9:05 PM      START taking these medications    Details   sulfamethoxazole-trimethoprim (BACTRIM DS) 800-160 MG per tablet Take 1 tablet by mouth 2 times daily for 10 days, Disp-20 tablet, R-0, Local Print      hydrocortisone 1 % ointment Apply sparingly to affected area three times daily for 14 days.Disp-30 g, R-0Local Print             Final diagnoses:   Rash   Facial cellulitis       11/24/2017   [facility], [place], EMERGENCY DEPARTMENT     Esdras Murillo MD  11/24/17 [id number]

## 2017-11-25 NOTE — DISCHARGE INSTRUCTIONS
Continue the Omnicef (cefdinir).  Continue clobetasol to the shin and wrists.  Add Bactrim DS 1 tablet twice/ day for 10 days.  Hydrocortisone ointment to the eyebrow 3 times/ day.  Follow up Dermatology Clinic [phone] next week. They should contact you Monday to schedule.

## 2017-11-29 ENCOUNTER — HOSPITAL ENCOUNTER (OUTPATIENT)
Dept: CARDIOLOGY | Facility: CLINIC | Age: 52
Discharge: HOME | End: 2017-11-29
Attending: NURSE PRACTITIONER | Admitting: NURSE PRACTITIONER
Payer: COMMERCIAL

## 2017-11-29 DIAGNOSIS — R07.9 CHEST PAIN, UNSPECIFIED TYPE: ICD-10-CM

## 2017-11-29 PROCEDURE — 93016 CV STRESS TEST SUPVJ ONLY: CPT | Performed by: INTERNAL MEDICINE

## 2017-11-29 PROCEDURE — 25500064 ZZH RX 255 OP 636: Performed by: NURSE PRACTITIONER

## 2017-11-29 PROCEDURE — 93321 DOPPLER ECHO F-UP/LMTD STD: CPT | Mod: 26 | Performed by: INTERNAL MEDICINE

## 2017-11-29 PROCEDURE — 40000264 ECHO STRESS TEST WITH LUMASON

## 2017-11-29 PROCEDURE — 93325 DOPPLER ECHO COLOR FLOW MAPG: CPT | Mod: 26 | Performed by: INTERNAL MEDICINE

## 2017-11-29 PROCEDURE — 93018 CV STRESS TEST I&R ONLY: CPT | Performed by: INTERNAL MEDICINE

## 2017-11-29 PROCEDURE — 93350 STRESS TTE ONLY: CPT | Mod: 26 | Performed by: INTERNAL MEDICINE

## 2017-11-29 RX ADMIN — SULFUR HEXAFLUORIDE 5 ML: KIT at 10:06

## 2017-11-30 ENCOUNTER — OFFICE VISIT (OUTPATIENT)
Dept: DERMATOLOGY | Facility: CLINIC | Age: 52
End: 2017-11-30

## 2017-11-30 DIAGNOSIS — B02.9 HERPES ZOSTER WITHOUT COMPLICATION: Primary | ICD-10-CM

## 2017-11-30 DIAGNOSIS — L20.9 ATOPIC DERMATITIS, UNSPECIFIED TYPE: ICD-10-CM

## 2017-11-30 RX ORDER — TRIAMCINOLONE ACETONIDE 1 MG/G
OINTMENT TOPICAL 2 TIMES DAILY
Qty: 30 G | Refills: 3 | Status: SHIPPED | OUTPATIENT
Start: 2017-11-30 | End: 2018-11-29

## 2017-11-30 RX ORDER — VALACYCLOVIR HYDROCHLORIDE 1 G/1
1000 TABLET, FILM COATED ORAL 3 TIMES DAILY
Qty: 21 TABLET | Refills: 0 | Status: SHIPPED | OUTPATIENT
Start: 2017-11-30 | End: 2020-05-28

## 2017-11-30 ASSESSMENT — PAIN SCALES - GENERAL: PAINLEVEL: NO PAIN (0)

## 2017-11-30 NOTE — LETTER
11/30/2017       RE: Charu Contreras  [address]  [facility] [address]     Dear Colleague,    Thank you for referring your patient, Charu Contreras, to the [facility] DERMATOLOGY at [facility]. Please see a copy of my visit note below.    [facility] Dermatology Note      Dermatology Problem List:  1. Likely herpes zoster of periorbital area, forehead, scalp; clinically diagnosed: treat with valacyclovir 1000 mg TID x 7 days  2. Atopic dermatitis: triamcinolone 0.01% ointment     Encounter Date: Nov 30, 2017    CC:   Chief Complaint   Patient presents with     Rash     Charu is here today for ED follow up for a rash on her face.          History of Present Illness:  Ms. Charu Contreras is a 52 year old female who presents for evaluation of a rash on the right side of her face, involving the skin around her eye as well as rash on her right shin and both of her volar wrists and forearms. Patient states that she has had issues with a rashes on her shins, wrists, forearms, and dorsal hands for over a year now. She has applied topical steroids to these areas in the past with improvement. She developed an itchy painful rash around her right eye that was accompanied with eye pain, right frontal headache, right face pain, and right ear pain that started roughly two weeks ago. She was seen at Urgent Care for this and was prescribed ibuprofen and cefdinir 300 mg PO BID x 10 days. Her rash and associated pain continued to worsen until she presented to the ED on 11/24/2017. ED provider prescribed sulfamethoxazole/trimethoprim and hydrocortisone cream and facilitated a follow up appointment with Dermatology. Today, patient states that she has only taken the cefdinir and has not taken the Bactrim or used the hydrocortisone. She states that the rash on her face is now better, but she still has mild eye pain, headache, and ear pain on the right side.  She denies that the rash ever consisted of vesicles and she denies getting cold sores. She had chicken pox as a young adult.     She denies fevers, chills, night sweats, ulcers in her mouth, or genital area.     Past Medical History:   Patient Active Problem List   Diagnosis     Hyperlipidemia, unspecified hyperlipidemia type     Obesity, unspecified obesity severity, unspecified obesity type     Vitamin D deficiency     Mild major depression (H)     History reviewed. No pertinent past medical history.  Past Surgical History:   Procedure Laterality Date     TUBAL LIGATION         Social History:  The patient works as an . Patient is not sexually active. Doesn't smoke or drink alcohol.    Family History:  No family history of autoimmune disease.   Family History   Problem Relation Age of Onset     DIABETES Son      Glaucoma No family hx of      Macular Degeneration No family hx of          Medications:  Current Outpatient Prescriptions   Medication Sig Dispense Refill     cefdinir (OMNICEF) 300 MG capsule Take 1 capsule (300 mg) by mouth 2 times daily 20 capsule 0     cyclobenzaprine (FLEXERIL) 5 MG tablet Take 1 tablet po QHS prn or Q 8 hours prn 30 tablet 0     ibuprofen (ADVIL/MOTRIN) 800 MG tablet Take 1 tablet (800 mg) by mouth every 8 hours as needed for moderate pain 30 tablet 1     GLUCOSAMINE SULFATE PO        Multiple Vitamin (MULTI VITAMIN PO) Take by mouth daily       VITAMIN D, CHOLECALCIFEROL, PO Take by mouth daily       clobetasol (TEMOVATE) 0.05 % cream Apply sparingly to affected area twice daily as needed 30 g 0     sulfamethoxazole-trimethoprim (BACTRIM DS) 800-160 MG per tablet Take 1 tablet by mouth 2 times daily for 10 days (Patient not taking: Reported on 11/30/2017) 20 tablet 0     hydrocortisone 1 % ointment Apply sparingly to affected area three times daily for 14 days. (Patient not taking: Reported on 11/30/2017) 30 g 0        No Known Allergies      Review of Systems:  -As per  HPI  -Constitutional: The patient denies fatigue, fevers, chills, unintended weight loss, and night sweats.  -HEENT: Patient denies nonhealing oral sores.  -Skin: As above in HPI. No additional skin concerns.    Physical exam:  Vitals: There were no vitals taken for this visit.  GEN: This is a well developed, well-nourished female in no acute distress, in a pleasant mood.    SKIN: Focused examination of the scalp, face, upper extremities and lower extremities was performed.  -There are pink scaly patches and plaques on the right shin, volar wrists and left dorsal hands.  -there are is a red circular 5 mm papule with necrotic center superior to the right medial canthus  -there are a few small erosions above the right mid to lateral eyebrow    Impression/Plan:  1. Herpes zoster infection in V1 distribution with associated right frontal headache, right eye pain. Patient also has right ear pain that may be referred. Clinically diagnosed.    Start Valtrex 1000 mg PO TID x 7 days.    2. Eczematous dermatitis    Start triamcinolone 0.1% ointment to affected areas BID      Follow-up prn for new or changing lesions.       Dr. Soto staffed the patient.    Staff Involved:  Resident(Casimiro Gaines)/Staff(as above)    Casimiro Gaines MD, PhD  Medicine-Dermatology PGY-2    I talked with and examined Charu Contreras and I agree with the assessment and the plan. CECELIA Soto MD.

## 2017-11-30 NOTE — MR AVS SNAPSHOT
After Visit Summary   2017    Charu Contreras    MRN: [id number]           Patient Information     Date Of Birth          1965        Visit Information        Provider Department      2017 8:30 AM Casimiro Gaines MD [facility] Dermatology        Today's Diagnoses     Herpes zoster without complication    -  1    Atopic dermatitis, unspecified type           Follow-ups after your visit        Follow-up notes from your care team     Return if symptoms worsen or fail to improve.      Future tests that were ordered for you today     Open Future Orders        Priority Expected Expires Ordered    Echo Stress Test with Lumason Routine  3/2/2018 3/2/2017            Who to contact     Please call your clinic at [phone] to:    Ask questions about your health    Make or cancel appointments    Discuss your medicines    Learn about your test results    Speak to your doctor   If you have compliments or concerns about an experience at your clinic, or if you wish to file a complaint, please contact [facility] Patient Relations at [phone] or email us at [email or web address]         Additional Information About Your Visit        MyChart Information     [employer] is an electronic gateway that provides easy, online access to your medical records. With [employer], you can request a clinic appointment, read your test results, renew a prescription or communicate with your care team.     To sign up for [employer] visit the website at [email or web address]   You will be asked to enter the access code listed below, as well as some personal information. Please follow the directions to create your username and password.     Your access code is: [email or web address]  Expires: 2018 10:24 AM     Your access code will  in 90 days. If you need help or a new code, please contact your [facility] Clinic or call [phone] for  assistance.        Care EveryWhere ID     This is your Care EveryWhere ID. This could be used by other organizations to access your [facility] medical records  [id number]         Blood Pressure from Last 3 Encounters:   11/24/17 148/78   11/22/17 123/73   11/16/17 120/84    Weight from Last 3 Encounters:   11/24/17 73.2 kg (161 lb 4.8 oz)   11/22/17 69.4 kg (153 lb)   11/16/17 71.9 kg (158 lb 8 oz)              Today, you had the following     No orders found for display         Today's Medication Changes          These changes are accurate as of: 11/30/17  9:17 AM.  If you have any questions, ask your nurse or doctor.               Start taking these medicines.        Dose/Directions    triamcinolone 0.1 % ointment   Commonly known as:  KENALOG   Used for:  Atopic dermatitis, unspecified type   Started by:  Casimiro Gaines MD        Apply topically 2 times daily   Quantity:  30 g   Refills:  3       valACYclovir 1000 mg tablet   Commonly known as:  VALTREX   Used for:  Herpes zoster without complication   Started by:  Casimiro Gaines MD        Dose:  1000 mg   Take 1 tablet (1,000 mg) by mouth 3 times daily   Quantity:  21 tablet   Refills:  0            Where to get your medicines      These medications were sent to [facility] [address] [place] [id number]     Phone:  [phone]     triamcinolone 0.1 % ointment    valACYclovir 1000 mg tablet                Primary Care Provider Fax #    Provider Not In System [phone]                Equal Access to Services     [facility] AH: Hadii chilo coto Soroger, waaxda luqadaha, qaybta kaalmada cornelio jeong. So [facility] [place] [phone].    ATENCIÓN: Si habla español, tiene a severino disposición servicios gratuitos de asistencia lingüística. Llame al [phone].    We comply with applicable federal civil rights laws and  [place] laws. We do not discriminate on the basis of race, color, national origin, age, disability, sex, sexual orientation, or gender identity.            Thank you!     Thank you for choosing [facility] DERMATOLOGY  for your care. Our goal is always to provide you with excellent care. Hearing back from our patients is one way we can continue to improve our services. Please take a few minutes to complete the written survey that you may receive in the mail after your visit with us. Thank you!             Your Updated Medication List - Protect others around you: Learn how to safely use, store and throw away your medicines at www.disposemymeds.org.          This list is accurate as of: 11/30/17  9:17 AM.  Always use your most recent med list.                   Brand Name Dispense Instructions for use Diagnosis    cefdinir 300 MG capsule    OMNICEF    20 capsule    Take 1 capsule (300 mg) by mouth 2 times daily    Local infection of skin and subcutaneous tissue       clobetasol 0.05 % cream    TEMOVATE    30 g    Apply sparingly to affected area twice daily as needed    Dermatitis       cyclobenzaprine 5 MG tablet    FLEXERIL    30 tablet    Take 1 tablet po QHS prn or Q 8 hours prn    Muscle spasm       GLUCOSAMINE SULFATE PO           hydrocortisone 1 % ointment     30 g    Apply sparingly to affected area three times daily for 14 days.        ibuprofen 800 MG tablet    ADVIL/MOTRIN    30 tablet    Take 1 tablet (800 mg) by mouth every 8 hours as needed for moderate pain    Muscle spasm       MULTI VITAMIN PO      Take by mouth daily        sulfamethoxazole-trimethoprim 800-160 MG per tablet    BACTRIM DS    20 tablet    Take 1 tablet by mouth 2 times daily for 10 days        triamcinolone 0.1 % ointment    KENALOG    30 g    Apply topically 2 times daily    Atopic dermatitis, unspecified type       valACYclovir 1000 mg tablet    VALTREX    21 tablet    Take 1 tablet (1,000 mg) by mouth 3 times daily    Herpes zoster  without complication       VITAMIN D (CHOLECALCIFEROL) PO      Take by mouth daily

## 2017-11-30 NOTE — PROGRESS NOTES
[facility] Dermatology Note      Dermatology Problem List:  1. Likely herpes zoster of periorbital area, forehead, scalp; clinically diagnosed: treat with valacyclovir 1000 mg TID x 7 days  2. Atopic dermatitis: triamcinolone 0.01% ointment     Encounter Date: Nov 30, 2017    CC:   Chief Complaint   Patient presents with     Rash     Charu is here today for ED follow up for a rash on her face.          History of Present Illness:  Ms. Charu Contreras is a 52 year old female who presents for evaluation of a rash on the right side of her face, involving the skin around her eye as well as rash on her right shin and both of her volar wrists and forearms. Patient states that she has had issues with a rashes on her shins, wrists, forearms, and dorsal hands for over a year now. She has applied topical steroids to these areas in the past with improvement. She developed an itchy painful rash around her right eye that was accompanied with eye pain, right frontal headache, right face pain, and right ear pain that started roughly two weeks ago. She was seen at Urgent Care for this and was prescribed ibuprofen and cefdinir 300 mg PO BID x 10 days. Her rash and associated pain continued to worsen until she presented to the ED on 11/24/2017. ED provider prescribed sulfamethoxazole/trimethoprim and hydrocortisone cream and facilitated a follow up appointment with Dermatology. Today, patient states that she has only taken the cefdinir and has not taken the Bactrim or used the hydrocortisone. She states that the rash on her face is now better, but she still has mild eye pain, headache, and ear pain on the right side. She denies that the rash ever consisted of vesicles and she denies getting cold sores. She had chicken pox as a young adult.     She denies fevers, chills, night sweats, ulcers in her mouth, or genital area.     Past Medical History:   Patient Active Problem List   Diagnosis      Hyperlipidemia, unspecified hyperlipidemia type     Obesity, unspecified obesity severity, unspecified obesity type     Vitamin D deficiency     Mild major depression (H)     History reviewed. No pertinent past medical history.  Past Surgical History:   Procedure Laterality Date     TUBAL LIGATION         Social History:  The patient works as an . Patient is not sexually active. Doesn't smoke or drink alcohol.    Family History:  No family history of autoimmune disease.   Family History   Problem Relation Age of Onset     DIABETES Son      Glaucoma No family hx of      Macular Degeneration No family hx of          Medications:  Current Outpatient Prescriptions   Medication Sig Dispense Refill     cefdinir (OMNICEF) 300 MG capsule Take 1 capsule (300 mg) by mouth 2 times daily 20 capsule 0     cyclobenzaprine (FLEXERIL) 5 MG tablet Take 1 tablet po QHS prn or Q 8 hours prn 30 tablet 0     ibuprofen (ADVIL/MOTRIN) 800 MG tablet Take 1 tablet (800 mg) by mouth every 8 hours as needed for moderate pain 30 tablet 1     GLUCOSAMINE SULFATE PO        Multiple Vitamin (MULTI VITAMIN PO) Take by mouth daily       VITAMIN D, CHOLECALCIFEROL, PO Take by mouth daily       clobetasol (TEMOVATE) 0.05 % cream Apply sparingly to affected area twice daily as needed 30 g 0     sulfamethoxazole-trimethoprim (BACTRIM DS) 800-160 MG per tablet Take 1 tablet by mouth 2 times daily for 10 days (Patient not taking: Reported on 11/30/2017) 20 tablet 0     hydrocortisone 1 % ointment Apply sparingly to affected area three times daily for 14 days. (Patient not taking: Reported on 11/30/2017) 30 g 0        No Known Allergies      Review of Systems:  -As per HPI  -Constitutional: The patient denies fatigue, fevers, chills, unintended weight loss, and night sweats.  -HEENT: Patient denies nonhealing oral sores.  -Skin: As above in HPI. No additional skin concerns.    Physical exam:  Vitals: There were no vitals taken for this  visit.  GEN: This is a well developed, well-nourished female in no acute distress, in a pleasant mood.    SKIN: Focused examination of the scalp, face, upper extremities and lower extremities was performed.  -There are pink scaly patches and plaques on the right shin, volar wrists and left dorsal hands.  -there are is a red circular 5 mm papule with necrotic center superior to the right medial canthus  -there are a few small erosions above the right mid to lateral eyebrow    Impression/Plan:  1. Herpes zoster infection in V1 distribution with associated right frontal headache, right eye pain. Patient also has right ear pain that may be referred. Clinically diagnosed.    Start Valtrex 1000 mg PO TID x 7 days.    2. Eczematous dermatitis    Start triamcinolone 0.1% ointment to affected areas BID      Follow-up prn for new or changing lesions.       Dr. Soto staffed the patient.    Staff Involved:  Resident(Casimiro Gaines)/Staff(as above)    Casimiro Gaines MD, PhD  Medicine-Dermatology PGY-2    I talked with and examined Charu Contreras and I agree with the assessment and the plan. CECELIA Soto MD.

## 2017-11-30 NOTE — NURSING NOTE
Dermatology Rooming Note    Charu Contreras's goals for this visit include:   Chief Complaint   Patient presents with     Rash     Charu is here today for ED follow up for a rash on her face.      JUSTUS Perez

## 2018-10-29 ENCOUNTER — OFFICE VISIT (OUTPATIENT)
Dept: URGENT CARE | Facility: URGENT CARE | Age: 53
End: 2018-10-29

## 2018-10-29 VITALS
WEIGHT: 155 LBS | RESPIRATION RATE: 14 BRPM | HEIGHT: 62 IN | TEMPERATURE: 98.5 F | BODY MASS INDEX: 28.52 KG/M2 | SYSTOLIC BLOOD PRESSURE: 124 MMHG | HEART RATE: 64 BPM | DIASTOLIC BLOOD PRESSURE: 82 MMHG

## 2018-10-29 DIAGNOSIS — L30.9 ECZEMA, UNSPECIFIED TYPE: Primary | ICD-10-CM

## 2018-10-29 DIAGNOSIS — L20.9 ATOPIC DERMATITIS, UNSPECIFIED TYPE: ICD-10-CM

## 2018-10-29 PROCEDURE — 99213 OFFICE O/P EST LOW 20 MIN: CPT | Performed by: INTERNAL MEDICINE

## 2018-10-29 RX ORDER — TRIAMCINOLONE ACETONIDE 1 MG/G
OINTMENT TOPICAL
Qty: 80 G | Refills: 0 | Status: SHIPPED | OUTPATIENT
Start: 2018-10-29 | End: 2018-10-29

## 2018-10-29 RX ORDER — TRIAMCINOLONE ACETONIDE 1 MG/G
OINTMENT TOPICAL
Qty: 80 G | Refills: 0 | Status: SHIPPED | OUTPATIENT
Start: 2018-10-29 | End: 2018-11-29

## 2018-10-29 NOTE — MR AVS SNAPSHOT
After Visit Summary   10/29/2018    Charu Lerma    MRN: [id number]           Patient Information     Date Of Birth          1965        Visit Information        Provider Department      10/29/2018 5:50 PM Raquel Hsieh MD [facility]        Today's Diagnoses     Eczema, unspecified type    -  1      Care Instructions    eucerin cream 2 x day  Steroid cream - use sparingly to rash only    See Dermatology if not improved       Managing Atopic Dermatitis (Eczema)     After bathing, gently pat your skin dry (don t rub). Apply moisturizer while your skin is still damp.   To manage your symptoms and help reduce the severity and frequency, try these self-care tips:  Caring for your skin    Use a gentle, fragrance-free cleanser (or nonsoap cleanser) for bathing. Rinse well. Pat skin dry.    Take warm, not hot, baths or showers. Try to limit them to no more that 10 to 15 minutes.     Use moisturizer liberally right after you bathe, while your skin is still damp.    Avoid scratching because it will cause more damage to your skin.     Topical, over-the-counter hydrocortisone cream may help control mild symptoms.   Controlling your environment    Avoid extreme heat or cold.    Avoid very humid or very dry air.    If your home or office air is very dry, use a humidifier.    Avoid allergens, such as dust, that may be present in bedding, carpets, plush toys, or rugs.    Know that pet hair and dander can cause flare-ups.  Seeking medical treatment  Another way to keep symptoms under control is to seek medical treatment. Talk with your healthcare provider about the type of treatment that may work best for you. Your provider may prescribe treatments such as the following:    Topical treatments to put on the skin daily    Medicines taken by mouth (oral medicines), such as antihistamines, antibiotics, or corticosteroids    In severe cases shots (injections) may be  needed to control the symptoms. You may even need antibiotics if skin infections occur.  Treatments don t work the same way for every person. So if your symptoms continue or get worse, ask your healthcare provider about other treatments.  Making lifestyle choices    Manage the stress in your life.    Wear loose-fitting cotton clothing that does not bind or rub your skin.    Avoid contact with wool or other scratchy fabrics.    Use fragrance-free products.  Getting good results  Now that you know more about atopic dermatitis, the next step is up to you. Follow your healthcare provider s treatment plan and your self-care routine. This will help bring atopic dermatitis under control. If your symptoms persist, be sure to let your health care provider know.   Date Last Reviewed: 2/1/2017 2000-2017 [employer]. [address] [place] [address]. All rights reserved. This information is not intended as a substitute for professional medical care. Always follow your healthcare professional's instructions.                Follow-ups after your visit        Your next 10 appointments already scheduled     Nov 29, 2018  2:00 PM CST   PHYSICAL with Chio Tovar MD   [facility] ([facility])    [address] [place] [id number] [phone]              Who to contact     If you have questions or need follow up information about today's clinic visit or your schedule please contact [facility] directly at [phone].  Normal or non-critical lab and imaging results will be communicated to you by MyChart, letter or phone within 4 business days after the clinic has received the results. If you do not hear from us within 7 days, please contact the clinic through MyChart or phone. If you have a critical or abnormal lab result, we will notify you by phone as soon as possible.  Submit refill requests through [employer] or  "call your pharmacy and they will forward the refill request to us. Please allow 3 business days for your refill to be completed.          Additional Information About Your Visit        Care EveryWhere ID     This is your Care EveryWhere ID. This could be used by other organizations to access your [facility] medical records  [id number]        Your Vitals Were     Pulse Temperature Respirations Height BMI (Body Mass Index)       64 98.5  F (36.9  C) (Oral) 14 5' 2\" (1.575 m) 28.35 kg/m2        Blood Pressure from Last 3 Encounters:   10/29/18 124/82   11/24/17 148/78   11/22/17 123/73    Weight from Last 3 Encounters:   10/29/18 155 lb (70.3 kg)   11/24/17 161 lb 4.8 oz (73.2 kg)   11/22/17 153 lb (69.4 kg)              Today, you had the following     No orders found for display         Today's Medication Changes          These changes are accurate as of 10/29/18  6:53 PM.  If you have any questions, ask your nurse or doctor.               These medicines have changed or have updated prescriptions.        Dose/Directions    * triamcinolone 0.1 % ointment   Commonly known as:  KENALOG   This may have changed:  Another medication with the same name was added. Make sure you understand how and when to take each.   Used for:  Atopic dermatitis, unspecified type   Changed by:  Raquel Hsieh MD        Apply topically 2 times daily   Quantity:  30 g   Refills:  3       * triamcinolone 0.1 % ointment   Commonly known as:  KENALOG   This may have changed:  You were already taking a medication with the same name, and this prescription was added. Make sure you understand how and when to take each.   Used for:  Eczema, unspecified type   Changed by:  Raquel Hsieh MD        Apply sparingly to affected area three times daily for 14 days.   Quantity:  80 g   Refills:  0       * Notice:  This list has 2 medication(s) that are the same as other medications prescribed for you. Read the directions carefully, and ask " your doctor or other care provider to review them with you.      Stop taking these medicines if you haven't already. Please contact your care team if you have questions.     cefdinir 300 MG capsule   Commonly known as:  OMNICEF   Stopped by:  Raquel Hsieh MD                Where to get your medicines      These medications were sent to [facility] [address] [address] AT [address] [address]     Phone:  [phone]     triamcinolone 0.1 % ointment                Primary Care Provider Office Phone # Fax #    [facility] [phone] [address] [facility] [address]        Equal Access to Services     AVELINA KEYES : Hadii chilo cespedes hadasho Soomaali, waaxda luqadaha, qaybta kaalmada adeegyada, cornelio mcmahon . So [facility] [phone].    ATENCIÓN: Si habla español, tiene a severino disposición servicios gratuitos de asistencia lingüística. Llame al [phone].    We comply with applicable federal civil rights laws and [place] laws. We do not discriminate on the basis of race, color, national origin, age, disability, sex, sexual orientation, or gender identity.            Thank you!     Thank you for choosing [facility]  for your care. Our goal is always to provide you with excellent care. Hearing back from our patients is one way we can continue to improve our services. Please take a few minutes to complete the written survey that you may receive in the mail after your visit with us. Thank you!             Your Updated Medication List - Protect others around you: Learn how to safely use, store and throw away your medicines at www.disposemymeds.org.          This list is accurate as of 10/29/18  6:53 PM.  Always use your most recent med list.                   Brand Name Dispense Instructions for use Diagnosis    clobetasol 0.05 % cream     TEMOVATE    30 g    Apply sparingly to affected area twice daily as needed    Dermatitis       cyclobenzaprine 5 MG tablet    FLEXERIL    30 tablet    Take 1 tablet po QHS prn or Q 8 hours prn    Muscle spasm       GLUCOSAMINE SULFATE PO           hydrocortisone 1 % ointment     30 g    Apply sparingly to affected area three times daily for 14 days.        ibuprofen 800 MG tablet    ADVIL/MOTRIN    30 tablet    Take 1 tablet (800 mg) by mouth every 8 hours as needed for moderate pain    Muscle spasm       MULTI VITAMIN PO      Take by mouth daily        * triamcinolone 0.1 % ointment    KENALOG    30 g    Apply topically 2 times daily    Atopic dermatitis, unspecified type       * triamcinolone 0.1 % ointment    KENALOG    80 g    Apply sparingly to affected area three times daily for 14 days.    Eczema, unspecified type       valACYclovir 1000 mg tablet    VALTREX    21 tablet    Take 1 tablet (1,000 mg) by mouth 3 times daily    Herpes zoster without complication       VITAMIN D (CHOLECALCIFEROL) PO      Take by mouth daily        * Notice:  This list has 2 medication(s) that are the same as other medications prescribed for you. Read the directions carefully, and ask your doctor or other care provider to review them with you.

## 2018-10-29 NOTE — PROGRESS NOTES
SUBJECTIVE:   Charu Lerma is a 53 year old female presenting with a chief complaint of   Chief Complaint   Patient presents with     Urgent Care     Derm Problem     rash on right leg that comes and goes but has come back worse now.        She is an established patient of [facility].    Rash    Onset of rash was 3 month(s) ago.   Course of illness is worsening.  Current and Associated symptoms: itching, red and flaking, dry   Location of the rash: lower leg.  Previous history of a similar rash? Yes  Recent exposure history: none known  Denies exposure to: new household products, new skincare products and viral illness  Associated symptoms include: nothing.  Treatment measures tried include: coconut oil        Review of Systems    No past medical history on file.  Family History   Problem Relation Age of Onset     Diabetes Son      Glaucoma No family hx of      Macular Degeneration No family hx of      Current Outpatient Prescriptions   Medication Sig Dispense Refill     Multiple Vitamin (MULTI VITAMIN PO) Take by mouth daily       triamcinolone (KENALOG) 0.1 % ointment Apply sparingly to affected area 2 times daily for 14 days. 80 g 0     VITAMIN D, CHOLECALCIFEROL, PO Take by mouth daily       clobetasol (TEMOVATE) 0.05 % cream Apply sparingly to affected area twice daily as needed 30 g 0     cyclobenzaprine (FLEXERIL) 5 MG tablet Take 1 tablet po QHS prn or Q 8 hours prn 30 tablet 0     GLUCOSAMINE SULFATE PO        hydrocortisone 1 % ointment Apply sparingly to affected area three times daily for 14 days. (Patient not taking: Reported on 11/30/2017) 30 g 0     ibuprofen (ADVIL/MOTRIN) 800 MG tablet Take 1 tablet (800 mg) by mouth every 8 hours as needed for moderate pain 30 tablet 1     triamcinolone (KENALOG) 0.1 % ointment Apply topically 2 times daily 30 g 3     valACYclovir (VALTREX) 1000 mg tablet Take 1 tablet (1,000 mg) by mouth 3 times daily 21 tablet 0     Social History   Substance Use  "Topics     Smoking status: Never Smoker     Smokeless tobacco: Never Used     Alcohol use No       OBJECTIVE  /82  Pulse 64  Temp 98.5  F (36.9  C) (Oral)  Resp 14  Ht 5' 2\" (1.575 m)  Wt 155 lb (70.3 kg)  BMI 28.35 kg/m2    Physical Exam   Constitutional: She appears well-developed.   Skin:   Multiple red circular flaky red patches = differing sizes on anterior shins bilateral   Pt itches during appt   Vitals reviewed.      Labs:  No results found for this or any previous visit (from the past 24 hour(s)).        ASSESSMENT:      ICD-10-CM    1. Eczema, unspecified type L30.9 triamcinolone (KENALOG) 0.1 % ointment     DISCONTINUED: triamcinolone (KENALOG) 0.1 % ointment   2. Atopic dermatitis, unspecified type L20.9         Medical Decision Making:  Use soap sparingly     Patient Instructions     eucerin cream 2 x day  Steroid cream - use sparingly to rash only    See Dermatology if not improved       Managing Atopic Dermatitis (Eczema)     After bathing, gently pat your skin dry (don t rub). Apply moisturizer while your skin is still damp.   To manage your symptoms and help reduce the severity and frequency, try these self-care tips:  Caring for your skin    Use a gentle, fragrance-free cleanser (or nonsoap cleanser) for bathing. Rinse well. Pat skin dry.    Take warm, not hot, baths or showers. Try to limit them to no more that 10 to 15 minutes.     Use moisturizer liberally right after you bathe, while your skin is still damp.    Avoid scratching because it will cause more damage to your skin.     Topical, over-the-counter hydrocortisone cream may help control mild symptoms.   Controlling your environment    Avoid extreme heat or cold.    Avoid very humid or very dry air.    If your home or office air is very dry, use a humidifier.    Avoid allergens, such as dust, that may be present in bedding, carpets, plush toys, or rugs.    Know that pet hair and dander can cause flare-ups.  Seeking medical " treatment  Another way to keep symptoms under control is to seek medical treatment. Talk with your healthcare provider about the type of treatment that may work best for you. Your provider may prescribe treatments such as the following:    Topical treatments to put on the skin daily    Medicines taken by mouth (oral medicines), such as antihistamines, antibiotics, or corticosteroids    In severe cases shots (injections) may be needed to control the symptoms. You may even need antibiotics if skin infections occur.  Treatments don t work the same way for every person. So if your symptoms continue or get worse, ask your healthcare provider about other treatments.  Making lifestyle choices    Manage the stress in your life.    Wear loose-fitting cotton clothing that does not bind or rub your skin.    Avoid contact with wool or other scratchy fabrics.    Use fragrance-free products.  Getting good results  Now that you know more about atopic dermatitis, the next step is up to you. Follow your healthcare provider s treatment plan and your self-care routine. This will help bring atopic dermatitis under control. If your symptoms persist, be sure to let your health care provider know.   Date Last Reviewed: 2/1/2017 2000-2017 [employer]. [address] [place] [address]. All rights reserved. This information is not intended as a substitute for professional medical care. Always follow your healthcare professional's instructions.

## 2018-10-29 NOTE — PATIENT INSTRUCTIONS
eucerin cream 2 x day  Steroid cream - use sparingly to rash only    See Dermatology if not improved       Managing Atopic Dermatitis (Eczema)     After bathing, gently pat your skin dry (don t rub). Apply moisturizer while your skin is still damp.   To manage your symptoms and help reduce the severity and frequency, try these self-care tips:  Caring for your skin    Use a gentle, fragrance-free cleanser (or nonsoap cleanser) for bathing. Rinse well. Pat skin dry.    Take warm, not hot, baths or showers. Try to limit them to no more that 10 to 15 minutes.     Use moisturizer liberally right after you bathe, while your skin is still damp.    Avoid scratching because it will cause more damage to your skin.     Topical, over-the-counter hydrocortisone cream may help control mild symptoms.   Controlling your environment    Avoid extreme heat or cold.    Avoid very humid or very dry air.    If your home or office air is very dry, use a humidifier.    Avoid allergens, such as dust, that may be present in bedding, carpets, plush toys, or rugs.    Know that pet hair and dander can cause flare-ups.  Seeking medical treatment  Another way to keep symptoms under control is to seek medical treatment. Talk with your healthcare provider about the type of treatment that may work best for you. Your provider may prescribe treatments such as the following:    Topical treatments to put on the skin daily    Medicines taken by mouth (oral medicines), such as antihistamines, antibiotics, or corticosteroids    In severe cases shots (injections) may be needed to control the symptoms. You may even need antibiotics if skin infections occur.  Treatments don t work the same way for every person. So if your symptoms continue or get worse, ask your healthcare provider about other treatments.  Making lifestyle choices    Manage the stress in your life.    Wear loose-fitting cotton clothing that does not bind or rub your skin.    Avoid contact  with wool or other scratchy fabrics.    Use fragrance-free products.  Getting good results  Now that you know more about atopic dermatitis, the next step is up to you. Follow your healthcare provider s treatment plan and your self-care routine. This will help bring atopic dermatitis under control. If your symptoms persist, be sure to let your health care provider know.   Date Last Reviewed: 2/1/2017 2000-2017 [employer]. [address] [place] [address], [place] [address]. All rights reserved. This information is not intended as a substitute for professional medical care. Always follow your healthcare professional's instructions.

## 2018-11-29 ENCOUNTER — OFFICE VISIT (OUTPATIENT)
Dept: FAMILY MEDICINE | Facility: CLINIC | Age: 53
End: 2018-11-29
Payer: COMMERCIAL

## 2018-11-29 VITALS
TEMPERATURE: 98.4 F | DIASTOLIC BLOOD PRESSURE: 77 MMHG | WEIGHT: 166 LBS | BODY MASS INDEX: 30.36 KG/M2 | SYSTOLIC BLOOD PRESSURE: 123 MMHG | OXYGEN SATURATION: 97 % | RESPIRATION RATE: 16 BRPM | HEART RATE: 80 BPM

## 2018-11-29 DIAGNOSIS — E55.9 VITAMIN D DEFICIENCY: ICD-10-CM

## 2018-11-29 DIAGNOSIS — R21 RASH: ICD-10-CM

## 2018-11-29 DIAGNOSIS — Z00.00 WELL ADULT EXAM: Primary | ICD-10-CM

## 2018-11-29 PROCEDURE — 99386 PREV VISIT NEW AGE 40-64: CPT | Mod: 25 | Performed by: FAMILY MEDICINE

## 2018-11-29 NOTE — MR AVS SNAPSHOT
After Visit Summary   11/29/2018    Charu Lerma    MRN: [id number]           Patient Information     Date Of Birth          1965        Visit Information        Provider Department      11/29/2018 2:00 PM Chio Tovar MD [facility]        Today's Diagnoses     Well adult exam    -  1    Vitamin D deficiency        Rash           Follow-ups after your visit        Follow-up notes from your care team     Return in about 1 year (around 11/29/2019) for Physical Exam.      Who to contact     If you have questions or need follow up information about today's clinic visit or your schedule please contact [facility] directly at [phone].  Normal or non-critical lab and imaging results will be communicated to you by MyChart, letter or phone within 4 business days after the clinic has received the results. If you do not hear from us within 7 days, please contact the clinic through MyChart or phone. If you have a critical or abnormal lab result, we will notify you by phone as soon as possible.  Submit refill requests through [employer] or call your pharmacy and they will forward the refill request to us. Please allow 3 business days for your refill to be completed.          Additional Information About Your Visit        Care EveryWhere ID     This is your Care EveryWhere ID. This could be used by other organizations to access your [facility] medical records  [id number]        Your Vitals Were     Pulse Temperature Respirations Pulse Oximetry Breastfeeding? BMI (Body Mass Index)    80 98.4  F (36.9  C) (Oral) 16 97% No 30.36 kg/m2       Blood Pressure from Last 3 Encounters:   11/29/18 123/77   10/29/18 124/82   11/24/17 148/78    Weight from Last 3 Encounters:   11/29/18 166 lb (75.3 kg)   10/29/18 155 lb (70.3 kg)   11/24/17 161 lb 4.8 oz (73.2 kg)                 Today's Medication Changes          These changes are accurate  as of 11/29/18 11:59 PM.  If you have any questions, ask your nurse or doctor.               Start taking these medicines.        Dose/Directions    crisaborole 2 % ointment   Commonly known as:  EUCRISA   Used for:  Rash   Started by:  Chio Tovar MD        Apply topically 2 times daily   Quantity:  60 g   Refills:  1         Stop taking these medicines if you haven't already. Please contact your care team if you have questions.     triamcinolone 0.1 % external ointment   Commonly known as:  KENALOG   Stopped by:  Chio Tovar MD                Where to get your medicines      These medications were sent to [facility] [address] [place] [id number]     Phone:  [phone]     crisaborole 2 % ointment                Primary Care Provider Office Phone # Fax #    [facility] [phone]       [facility] [address]        Equal Access to Services     AVELINA KEYES AH: Hadii aad ku hadasho Soomaali, waaxda luqadaha, qaybta kaalmada adeegyada, cornelio horta hayanan shy mcmahon . So [facility] [phone].    ATENCIÓN: Si habla español, tiene a severino disposición servicios gratuitos de asistencia lingüística. Llame al [phone].    We comply with applicable federal civil rights laws and [place] laws. We do not discriminate on the basis of race, color, national origin, age, disability, sex, sexual orientation, or gender identity.            Thank you!     Thank you for choosing [facility]  for your care. Our goal is always to provide you with excellent care. Hearing back from our patients is one way we can continue to improve our services. Please take a few minutes to complete the written survey that you may receive in the mail after your visit with us. Thank you!             Your Updated Medication List -  Protect others around you: Learn how to safely use, store and throw away your medicines at www.disposemymeds.org.          This list is accurate as of 11/29/18 11:59 PM.  Always use your most recent med list.                   Brand Name Dispense Instructions for use Diagnosis    clobetasol 0.05 % external cream    TEMOVATE    30 g    Apply sparingly to affected area twice daily as needed    Dermatitis       crisaborole 2 % ointment    EUCRISA    60 g    Apply topically 2 times daily    Rash       cyclobenzaprine 5 MG tablet    FLEXERIL    30 tablet    Take 1 tablet po QHS prn or Q 8 hours prn    Muscle spasm       GLUCOSAMINE SULFATE PO           hydrocortisone 1 % external ointment    CORTIZONE-10    30 g    Apply sparingly to affected area three times daily for 14 days.        ibuprofen 800 MG tablet    ADVIL/MOTRIN    30 tablet    Take 1 tablet (800 mg) by mouth every 8 hours as needed for moderate pain    Muscle spasm       MULTI VITAMIN PO      Take by mouth daily        valACYclovir 1000 mg tablet    VALTREX    21 tablet    Take 1 tablet (1,000 mg) by mouth 3 times daily    Herpes zoster without complication       VITAMIN D (CHOLECALCIFEROL) PO      Take by mouth daily

## 2018-11-30 DIAGNOSIS — Z00.00 WELL ADULT EXAM: ICD-10-CM

## 2018-11-30 DIAGNOSIS — E55.9 VITAMIN D DEFICIENCY: ICD-10-CM

## 2018-11-30 LAB
ALBUMIN SERPL-MCNC: 3.9 G/DL (ref 3.4–5)
ALP SERPL-CCNC: 130 U/L (ref 40–150)
ALT SERPL W P-5'-P-CCNC: 44 U/L (ref 0–50)
ANION GAP SERPL CALCULATED.3IONS-SCNC: 6 MMOL/L (ref 3–14)
AST SERPL W P-5'-P-CCNC: 20 U/L (ref 0–45)
BASOPHILS # BLD AUTO: 0 10E9/L (ref 0–0.2)
BASOPHILS NFR BLD AUTO: 0.2 %
BILIRUB SERPL-MCNC: 0.4 MG/DL (ref 0.2–1.3)
BUN SERPL-MCNC: 11 MG/DL (ref 7–30)
CALCIUM SERPL-MCNC: 10.4 MG/DL (ref 8.5–10.1)
CHLORIDE SERPL-SCNC: 109 MMOL/L (ref 94–109)
CHOLEST SERPL-MCNC: 236 MG/DL
CO2 SERPL-SCNC: 23 MMOL/L (ref 20–32)
CREAT SERPL-MCNC: 0.57 MG/DL (ref 0.52–1.04)
DEPRECATED CALCIDIOL+CALCIFEROL SERPL-MC: 21 UG/L (ref 20–75)
DIFFERENTIAL METHOD BLD: NORMAL
EOSINOPHIL # BLD AUTO: 0.3 10E9/L (ref 0–0.7)
EOSINOPHIL NFR BLD AUTO: 3.9 %
ERYTHROCYTE [DISTWIDTH] IN BLOOD BY AUTOMATED COUNT: 13 % (ref 10–15)
GFR SERPL CREATININE-BSD FRML MDRD: >90 ML/MIN/1.7M2
GLUCOSE SERPL-MCNC: 100 MG/DL (ref 70–99)
HCT VFR BLD AUTO: 40.3 % (ref 35–47)
HDLC SERPL-MCNC: 45 MG/DL
HGB BLD-MCNC: 13.2 G/DL (ref 11.7–15.7)
LDLC SERPL CALC-MCNC: 150 MG/DL
LYMPHOCYTES # BLD AUTO: 2.7 10E9/L (ref 0.8–5.3)
LYMPHOCYTES NFR BLD AUTO: 31.6 %
MCH RBC QN AUTO: 30.1 PG (ref 26.5–33)
MCHC RBC AUTO-ENTMCNC: 32.8 G/DL (ref 31.5–36.5)
MCV RBC AUTO: 92 FL (ref 78–100)
MONOCYTES # BLD AUTO: 0.7 10E9/L (ref 0–1.3)
MONOCYTES NFR BLD AUTO: 8.2 %
NEUTROPHILS # BLD AUTO: 4.8 10E9/L (ref 1.6–8.3)
NEUTROPHILS NFR BLD AUTO: 56.1 %
NONHDLC SERPL-MCNC: 191 MG/DL
PLATELET # BLD AUTO: 315 10E9/L (ref 150–450)
POTASSIUM SERPL-SCNC: 3.9 MMOL/L (ref 3.4–5.3)
PROT SERPL-MCNC: 8.1 G/DL (ref 6.8–8.8)
RBC # BLD AUTO: 4.38 10E12/L (ref 3.8–5.2)
SODIUM SERPL-SCNC: 138 MMOL/L (ref 133–144)
TRIGL SERPL-MCNC: 203 MG/DL
WBC # BLD AUTO: 8.6 10E9/L (ref 4–11)

## 2018-11-30 PROCEDURE — 82306 VITAMIN D 25 HYDROXY: CPT | Performed by: FAMILY MEDICINE

## 2018-11-30 PROCEDURE — 80061 LIPID PANEL: CPT | Performed by: FAMILY MEDICINE

## 2018-11-30 PROCEDURE — 80053 COMPREHEN METABOLIC PANEL: CPT | Performed by: FAMILY MEDICINE

## 2018-11-30 PROCEDURE — 85025 COMPLETE CBC W/AUTO DIFF WBC: CPT | Performed by: FAMILY MEDICINE

## 2018-11-30 PROCEDURE — 36415 COLL VENOUS BLD VENIPUNCTURE: CPT | Performed by: FAMILY MEDICINE

## 2018-12-04 NOTE — PROGRESS NOTES
SUBJECTIVE:   CC: Charu Lerma is an 53 year old woman who presents for preventive health visit.     HPI  Presents for CPE.  All HCM is up-to-date.  Requests recheck of her Vitamin D level- has been low in past.  Also needs refill of Eucrisa which she has used with good results in past for atopic dermatitis.    Today's PHQ-2 Score:   PHQ-2 ( 1999 Pfizer) 11/16/2017   Q1: Little interest or pleasure in doing things 0   Q2: Feeling down, depressed or hopeless 1   PHQ-2 Score 1   Q1: Little interest or pleasure in doing things Nearly every day   Q2: Feeling down, depressed or hopeless Not at all   PHQ-2 Score 3       Abuse: Current or Past(Physical, Sexual or Emotional)- No  Do you feel safe in your environment? Yes    Social History   Substance Use Topics     Smoking status: Never Smoker     Smokeless tobacco: Never Used     Alcohol use No     No flowsheet data found.    Reviewed orders with patient.  Reviewed health maintenance and updated orders accordingly - Yes  Labs reviewed in EPIC  BP Readings from Last 3 Encounters:   11/29/18 123/77   10/29/18 124/82   11/24/17 148/78    Wt Readings from Last 3 Encounters:   11/29/18 166 lb (75.3 kg)   10/29/18 155 lb (70.3 kg)   11/24/17 161 lb 4.8 oz (73.2 kg)                  Patient Active Problem List   Diagnosis     Hyperlipidemia, unspecified hyperlipidemia type     Obesity, unspecified obesity severity, unspecified obesity type     Vitamin D deficiency     Mild major depression (H)     Past Surgical History:   Procedure Laterality Date     TUBAL LIGATION         Social History   Substance Use Topics     Smoking status: Never Smoker     Smokeless tobacco: Never Used     Alcohol use No     Family History   Problem Relation Age of Onset     Diabetes Son      Glaucoma No family hx of      Macular Degeneration No family hx of          Current Outpatient Prescriptions   Medication Sig Dispense Refill     clobetasol (TEMOVATE) 0.05 % cream Apply  sparingly to affected area twice daily as needed 30 g 0     crisaborole (EUCRISA) 2 % ointment Apply topically 2 times daily 60 g 1     cyclobenzaprine (FLEXERIL) 5 MG tablet Take 1 tablet po QHS prn or Q 8 hours prn 30 tablet 0     GLUCOSAMINE SULFATE PO        Multiple Vitamin (MULTI VITAMIN PO) Take by mouth daily       valACYclovir (VALTREX) 1000 mg tablet Take 1 tablet (1,000 mg) by mouth 3 times daily 21 tablet 0     hydrocortisone 1 % ointment Apply sparingly to affected area three times daily for 14 days. (Patient not taking: Reported on 11/30/2017) 30 g 0     ibuprofen (ADVIL/MOTRIN) 800 MG tablet Take 1 tablet (800 mg) by mouth every 8 hours as needed for moderate pain (Patient not taking: Reported on 11/29/2018) 30 tablet 1     VITAMIN D, CHOLECALCIFEROL, PO Take by mouth daily       No Known Allergies  Recent Labs   Lab Test  11/30/18   0810  11/24/17   1821  11/16/17   1226  09/27/16   0954   LDL  150*   --   160*  170*   HDL  45*   --   49*  45*   TRIG  203*   --   179*  156*   ALT  44  32  37   --    CR  0.57  0.65  0.67  0.62   GFRESTIMATED  >90  >90  >90  >90  Non African American GFR Calc     GFRESTBLACK  >90  >90  >90  >90  African American GFR Calc     POTASSIUM  3.9  4.2  4.2  4.1   TSH   --    --   2.42   --         Mammogram Screening: Patient over age 50, mutual decision to screen reflected in health maintenance.    Pertinent mammograms are reviewed under the imaging tab.  History of abnormal Pap smear: NO - age 30-65 PAP every 5 years with negative HPV co-testing recommended  PAP / HPV Latest Ref Rng & Units 9/27/2016   PAP - NIL   HPV 16 DNA NEG Negative   HPV 18 DNA NEG Negative   OTHER HR HPV NEG Negative     Reviewed and updated as needed this visit by clinical staff  Tobacco  Allergies  Meds  Med Hx  Surg Hx  Fam Hx  Soc Hx        Reviewed and updated as needed this visit by Provider            Review of Systems  CONSTITUTIONAL: NEGATIVE for fever, chills, change in  weight  INTEGUMENTARY/SKIN: NEGATIVE for worrisome rashes, moles or lesions  EYES: NEGATIVE for vision changes or irritation  ENT: NEGATIVE for ear, mouth and throat problems  RESP: NEGATIVE for significant cough or SOB  CV: NEGATIVE for chest pain, palpitations or peripheral edema  : NEGATIVE for unusual urinary or vaginal symptoms. No vaginal bleeding.  MUSCULOSKELETAL: NEGATIVE for significant arthralgias or myalgia  NEURO: NEGATIVE for weakness, dizziness or paresthesias  PSYCHIATRIC: NEGATIVE for changes in mood or affect      OBJECTIVE:   /77  Pulse 80  Temp 98.4  F (36.9  C) (Oral)  Resp 16  Wt 166 lb (75.3 kg)  SpO2 97%  Breastfeeding? No  BMI 30.36 kg/m2  Physical Exam  GENERAL: healthy, alert and no distress  EYES: Eyes grossly normal to inspection, PERRL and conjunctivae and sclerae normal  HENT: ear canals and TM's normal, nose and mouth without ulcers or lesions  NECK: no adenopathy, no asymmetry, masses, or scars and thyroid normal to palpation  RESP: lungs clear to auscultation - no rales, rhonchi or wheezes  CV: regular rate and rhythm, normal S1 S2, no S3 or S4, no murmur, click or rub, no peripheral edema and peripheral pulses strong  ABDOMEN: soft, nontender, no hepatosplenomegaly, no masses and bowel sounds normal  MS: no gross musculoskeletal defects noted, no edema  SKIN: no suspicious lesions or rashes  NEURO: Normal strength and tone, mentation intact and speech normal  PSYCH: mentation appears normal, affect normal/bright        ASSESSMENT/PLAN:   1. Well adult exam    - CBC with platelets differential; Future  - Comprehensive metabolic panel; Future  - Lipid panel reflex to direct LDL Fasting; Future    Will return for fasting labs.  Continue improved diet and exercise.  All HCM UTD.    2. Vitamin D deficiency    - 25- [place]-Vitamin D; Future    Recheck of Vitamin D.  Continue to supplement prn to keep level > 30.    3. Rash    - crisaborole (EUCRISA) 2 % ointment; Apply  "topically 2 times daily  Dispense: 60 g; Refill: 1    Refill of Eucrisa for eczema.    COUNSELING:  Reviewed preventive health counseling, as reflected in patient instructions       Regular exercise       Healthy diet/nutrition       (Shraddha)menopause management    BP Readings from Last 1 Encounters:   11/29/18 123/77     Estimated body mass index is 30.36 kg/(m^2) as calculated from the following:    Height as of 10/29/18: 5' 2\" (1.575 m).    Weight as of this encounter: 166 lb (75.3 kg).    BP Screening:   Last 3 BP Readings:    BP Readings from Last 3 Encounters:   11/29/18 123/77   10/29/18 124/82   11/24/17 148/78       The following was recommended to the patient:  Re-screen BP within a year and recommended lifestyle modifications  Weight management plan: Discussed healthy diet and exercise guidelines     reports that she has never smoked. She has never used smokeless tobacco.      Counseling Resources:  ATP IV Guidelines  Pooled Cohorts Equation Calculator  Breast Cancer Risk Calculator  FRAX Risk Assessment  ICSI Preventive Guidelines  Dietary Guidelines for Americans, 2010  USDA's MyPlate  ASA Prophylaxis  Lung CA Screening    Chio Tovar MD  [facility]  "

## 2019-07-02 ENCOUNTER — TELEPHONE (OUTPATIENT)
Dept: FAMILY MEDICINE | Facility: CLINIC | Age: 54
End: 2019-07-02

## 2019-07-02 DIAGNOSIS — E55.9 VITAMIN D DEFICIENCY: ICD-10-CM

## 2019-07-02 DIAGNOSIS — R79.89: ICD-10-CM

## 2019-07-02 DIAGNOSIS — R73.09 BLOOD GLUCOSE ABNORMAL: ICD-10-CM

## 2019-07-02 DIAGNOSIS — E78.5 HYPERLIPIDEMIA, UNSPECIFIED HYPERLIPIDEMIA TYPE: Primary | ICD-10-CM

## 2019-07-02 DIAGNOSIS — E78.5 HYPERLIPIDEMIA, UNSPECIFIED HYPERLIPIDEMIA TYPE: ICD-10-CM

## 2019-07-02 LAB
ALBUMIN SERPL-MCNC: 4.1 G/DL (ref 3.4–5)
ALP SERPL-CCNC: 160 U/L (ref 40–150)
ALT SERPL W P-5'-P-CCNC: 31 U/L (ref 0–50)
ANION GAP SERPL CALCULATED.3IONS-SCNC: 6 MMOL/L (ref 3–14)
AST SERPL W P-5'-P-CCNC: 18 U/L (ref 0–45)
BILIRUB SERPL-MCNC: 0.4 MG/DL (ref 0.2–1.3)
BUN SERPL-MCNC: 14 MG/DL (ref 7–30)
CALCIUM SERPL-MCNC: 10.5 MG/DL (ref 8.5–10.1)
CHLORIDE SERPL-SCNC: 110 MMOL/L (ref 94–109)
CHOLEST SERPL-MCNC: 227 MG/DL
CO2 SERPL-SCNC: 26 MMOL/L (ref 20–32)
CREAT SERPL-MCNC: 0.64 MG/DL (ref 0.52–1.04)
DEPRECATED CALCIDIOL+CALCIFEROL SERPL-MC: 28 UG/L (ref 20–75)
GFR SERPL CREATININE-BSD FRML MDRD: >90 ML/MIN/{1.73_M2}
GLUCOSE SERPL-MCNC: 90 MG/DL (ref 70–99)
HDLC SERPL-MCNC: 44 MG/DL
LDLC SERPL CALC-MCNC: 147 MG/DL
NONHDLC SERPL-MCNC: 183 MG/DL
POTASSIUM SERPL-SCNC: 4.3 MMOL/L (ref 3.4–5.3)
PROT SERPL-MCNC: 8.2 G/DL (ref 6.8–8.8)
SODIUM SERPL-SCNC: 142 MMOL/L (ref 133–144)
TRIGL SERPL-MCNC: 179 MG/DL

## 2019-07-02 PROCEDURE — 80053 COMPREHEN METABOLIC PANEL: CPT | Performed by: FAMILY MEDICINE

## 2019-07-02 PROCEDURE — 80061 LIPID PANEL: CPT | Performed by: FAMILY MEDICINE

## 2019-07-02 PROCEDURE — 82306 VITAMIN D 25 HYDROXY: CPT | Performed by: FAMILY MEDICINE

## 2019-07-02 PROCEDURE — 36415 COLL VENOUS BLD VENIPUNCTURE: CPT | Performed by: FAMILY MEDICINE

## 2019-07-02 NOTE — TELEPHONE ENCOUNTER
Reason for call:  Order   Order or referral being requested: blood   Reason for request: was abnormal on 11/2018  Date needed: as soon as possible  Has the patient been seen by the PCP for this problem? YES    Additional comments: Patient is leaving for [place] 7/5 and would like to get this checked befor eshe leaves.    Phone number to reach patient:  Cell number on file:    Telephone Information:   Mobile [phone]       Best Time:  any    Can we leave a detailed message on this number?  YES

## 2019-07-18 ENCOUNTER — TELEPHONE (OUTPATIENT)
Dept: FAMILY MEDICINE | Facility: CLINIC | Age: 54
End: 2019-07-18

## 2019-07-18 ENCOUNTER — OFFICE VISIT (OUTPATIENT)
Dept: FAMILY MEDICINE | Facility: CLINIC | Age: 54
End: 2019-07-18
Payer: COMMERCIAL

## 2019-07-18 VITALS
WEIGHT: 159 LBS | DIASTOLIC BLOOD PRESSURE: 72 MMHG | TEMPERATURE: 97.8 F | HEIGHT: 62 IN | HEART RATE: 68 BPM | RESPIRATION RATE: 14 BRPM | SYSTOLIC BLOOD PRESSURE: 112 MMHG | BODY MASS INDEX: 29.26 KG/M2

## 2019-07-18 DIAGNOSIS — R21 RASH: ICD-10-CM

## 2019-07-18 DIAGNOSIS — E78.5 HYPERLIPIDEMIA, UNSPECIFIED HYPERLIPIDEMIA TYPE: Primary | ICD-10-CM

## 2019-07-18 DIAGNOSIS — R74.8 ELEVATED ALKALINE PHOSPHATASE LEVEL: ICD-10-CM

## 2019-07-18 DIAGNOSIS — F32.0 MILD MAJOR DEPRESSION (H): ICD-10-CM

## 2019-07-18 DIAGNOSIS — E83.52 HYPERCALCEMIA: ICD-10-CM

## 2019-07-18 LAB
CA-I BLD-MCNC: 5.5 MG/DL (ref 4.4–5.2)
GGT SERPL-CCNC: 31 U/L (ref 0–40)
PTH-INTACT SERPL-MCNC: 206 PG/ML (ref 18–80)

## 2019-07-18 PROCEDURE — 36415 COLL VENOUS BLD VENIPUNCTURE: CPT | Performed by: FAMILY MEDICINE

## 2019-07-18 PROCEDURE — 82330 ASSAY OF CALCIUM: CPT | Performed by: FAMILY MEDICINE

## 2019-07-18 PROCEDURE — 99214 OFFICE O/P EST MOD 30 MIN: CPT | Performed by: FAMILY MEDICINE

## 2019-07-18 PROCEDURE — 83970 ASSAY OF PARATHORMONE: CPT | Performed by: FAMILY MEDICINE

## 2019-07-18 PROCEDURE — 82977 ASSAY OF GGT: CPT | Performed by: FAMILY MEDICINE

## 2019-07-18 RX ORDER — ATORVASTATIN CALCIUM 10 MG/1
10 TABLET, FILM COATED ORAL DAILY
Qty: 30 TABLET | Refills: 3 | Status: SHIPPED | OUTPATIENT
Start: 2019-07-18 | End: 2019-10-02

## 2019-07-18 ASSESSMENT — ANXIETY QUESTIONNAIRES
7. FEELING AFRAID AS IF SOMETHING AWFUL MIGHT HAPPEN: NOT AT ALL
3. WORRYING TOO MUCH ABOUT DIFFERENT THINGS: NOT AT ALL
6. BECOMING EASILY ANNOYED OR IRRITABLE: SEVERAL DAYS
GAD7 TOTAL SCORE: 1
1. FEELING NERVOUS, ANXIOUS, OR ON EDGE: NOT AT ALL
5. BEING SO RESTLESS THAT IT IS HARD TO SIT STILL: NOT AT ALL
2. NOT BEING ABLE TO STOP OR CONTROL WORRYING: NOT AT ALL
IF YOU CHECKED OFF ANY PROBLEMS ON THIS QUESTIONNAIRE, HOW DIFFICULT HAVE THESE PROBLEMS MADE IT FOR YOU TO DO YOUR WORK, TAKE CARE OF THINGS AT HOME, OR GET ALONG WITH OTHER PEOPLE: NOT DIFFICULT AT ALL

## 2019-07-18 ASSESSMENT — PATIENT HEALTH QUESTIONNAIRE - PHQ9
5. POOR APPETITE OR OVEREATING: NOT AT ALL
SUM OF ALL RESPONSES TO PHQ QUESTIONS 1-9: 11

## 2019-07-18 ASSESSMENT — MIFFLIN-ST. JEOR: SCORE: 1264.47

## 2019-07-18 NOTE — TELEPHONE ENCOUNTER
Prior Authorization Retail Medication Request    Medication/Dose: eucrisa  ICD code (if different than what is on RX):    Previously Tried and Failed:    Rationale:      [facility]  Insurance ID:[id number]      Pharmacy Information (if different than what is on RX)  Name:  Ervin Esqueda  Phone:  [phone]

## 2019-07-18 NOTE — LETTER
August 5, 2019      Charu Lerma  [address]  [facility] [address]        Dear Ms.Mendoza Lerma,    We are writing to inform you of your test results.    As we spoke on the phone last weekend, you have been referred to ENDOCRINOLOGY for a new diagnosis of HYPERPARATHYROIDISM.  This causes an increased calcium level in your blood which can weaken the bones and cause kidney stones.  They will discuss treatment options with you.     Resulted Orders   Parathyroid Hormone Intact   Result Value Ref Range    Parathyroid Hormone Intact 206 (H) 18 - 80 pg/mL   GGT   Result Value Ref Range    GGT 31 0 - 40 U/L   Calcium ionized whole blood   Result Value Ref Range    Calcium Ionized Whole Blood 5.5 (H) 4.4 - 5.2 mg/dL       If you have any questions or concerns, please call the clinic at the number listed above.       Sincerely,        Chio Tovar MD/nr

## 2019-07-18 NOTE — PROGRESS NOTES
Subjective     Charu Lerma is a 54 year old female who presents to clinic today for the following health issues:    HPI   Hyperlipidemia Follow-Up      Are you having any of the following symptoms? (Select all that apply)  Increased sweating or nausea with activity and Left-sided neck or arm pain    Are you regularly taking any medication or supplement to lower your cholesterol?   No    Are you having muscle aches or other side effects that you think could be caused by your cholesterol lowering medication?  No        Amount of exercise or physical activity: 4-5 days/week for an average of 15-30 minutes    Problems taking medications regularly: No    Medication side effects: none    Diet: regular (no restrictions)    PHQ-9 SCORE 11/16/2017 7/18/2019 7/18/2019   PHQ-9 Total Score MyChart 6 (Mild depression) - -   PHQ-9 Total Score 6 6 11     Patient presents today with persistently elevated lipids despite diet and exercise modification.  She is open to starting medication today.  She would also like a nutrition referral.    Depression is stable today with PHQ9 score of 6.  Currently off meds.    Her calcium level remains high as well as her alk phos.  She is due for additional labs today to assess cause--    Request refill to DERM for her eczema.  She requests refill of Eucrisa today.      Patient Active Problem List   Diagnosis     Hyperlipidemia, unspecified hyperlipidemia type     Obesity, unspecified obesity severity, unspecified obesity type     Vitamin D deficiency     Mild major depression (H)     Past Surgical History:   Procedure Laterality Date     TUBAL LIGATION         Social History     Tobacco Use     Smoking status: Never Smoker     Smokeless tobacco: Never Used   Substance Use Topics     Alcohol use: No     Family History   Problem Relation Age of Onset     Diabetes Son      Glaucoma No family hx of      Macular Degeneration No family hx of          Current Outpatient Medications  "  Medication Sig Dispense Refill     atorvastatin (LIPITOR) 10 MG tablet Take 1 tablet (10 mg) by mouth daily 30 tablet 3     crisaborole (EUCRISA) 2 % ointment Apply topically 2 times daily 60 g 1     cyclobenzaprine (FLEXERIL) 5 MG tablet Take 1 tablet po QHS prn or Q 8 hours prn 30 tablet 0     GLUCOSAMINE SULFATE PO        Multiple Vitamin (MULTI VITAMIN PO) Take by mouth daily       VITAMIN D, CHOLECALCIFEROL, PO Take by mouth daily       valACYclovir (VALTREX) 1000 mg tablet Take 1 tablet (1,000 mg) by mouth 3 times daily (Patient not taking: Reported on 7/18/2019) 21 tablet 0     No Known Allergies  Recent Labs   Lab Test 07/02/19  1018 11/30/18  0810 11/24/17  1821 11/16/17  1226   * 150*  --  160*   HDL 44* 45*  --  49*   TRIG 179* 203*  --  179*   ALT 31 44 32 37   CR 0.64 0.57 0.65 0.67   GFRESTIMATED >90 >90 >90 >90   GFRESTBLACK >90 >90 >90 >90   POTASSIUM 4.3 3.9 4.2 4.2   TSH  --   --   --  2.42      BP Readings from Last 3 Encounters:   07/18/19 112/72   11/29/18 123/77   10/29/18 124/82    Wt Readings from Last 3 Encounters:   07/18/19 72.1 kg (159 lb)   11/29/18 75.3 kg (166 lb)   10/29/18 70.3 kg (155 lb)                      Reviewed and updated as needed this visit by Provider         Review of Systems   ROS COMP: Constitutional, HEENT, cardiovascular, pulmonary, GI, , musculoskeletal, neuro, skin, endocrine and psych systems are negative, except as otherwise noted.      Objective    /72   Pulse 68   Temp 97.8  F (36.6  C)   Resp 14   Ht 1.575 m (5' 2\")   Wt 72.1 kg (159 lb)   BMI 29.08 kg/m    Body mass index is 29.08 kg/m .  Physical Exam   GENERAL: healthy, alert and no distress  HENT: ear canals and TM's normal, nose and mouth without ulcers or lesions  NECK: no adenopathy, no asymmetry, masses, or scars and thyroid normal to palpation  MS: no gross musculoskeletal defects noted, no edema  PSYCH: mentation appears normal, affect normal/bright          Assessment & Plan "     1. Hyperlipidemia, unspecified hyperlipidemia type    - atorvastatin (LIPITOR) 10 MG tablet; Take 1 tablet (10 mg) by mouth daily  Dispense: 30 tablet; Refill: 3  - NUTRITION REFERRAL    Start Lipitor 10 mg with Follow-up lipids in 3-4 months with goal LDL < 100.  Desire nutrition referral for dietary recommendations as well.    2. Mild major depression (H)    Stable off medication at this time.    3. Hypercalcemia    - Parathyroid Hormone Intact  - Calcium ionized whole blood    Check PTH and ionized calcium for continued work up of elevated calcium.    4. Elevated alkaline phosphatase level    - GGT    GGT to determine bone vs liver causes for elevated alk phos level.    5. Rash    - crisaborole (EUCRISA) 2 % ointment; Apply topically 2 times daily  Dispense: 60 g; Refill: 1  - DERMATOLOGY REFERRAL          Chio Tovar MD  [facility]

## 2019-07-19 ASSESSMENT — ANXIETY QUESTIONNAIRES: GAD7 TOTAL SCORE: 1

## 2019-07-26 NOTE — TELEPHONE ENCOUNTER
Central Prior Authorization Team   Phone: [phone]      PA Initiation    Medication: eucrisa-Initiated  Insurance Company: [employer] - Phone [phone] Fax [phone]  Pharmacy Filling the Rx: [facility] - [place], [place] - [address]  Filling Pharmacy Phone: [phone]  Filling Pharmacy Fax:    Start Date: 7/26/2019

## 2019-07-27 ENCOUNTER — NURSE TRIAGE (OUTPATIENT)
Dept: NURSING | Facility: CLINIC | Age: 54
End: 2019-07-27

## 2019-07-27 DIAGNOSIS — E21.3 HYPERPARATHYROIDISM (H): Primary | ICD-10-CM

## 2019-07-27 NOTE — TELEPHONE ENCOUNTER
Caller states she is calling regarding lab results from July 18th, 2019. Caller wants to know why the provider has not called her regarding her lab results. Triage guidelines recommend to call back when the clinic opens up. Caller verbalized and understands directives.  Reason for Disposition    Caller requesting lab results    Additional Information    Negative: Lab calling with strep throat test results and triager can call in prescription    Negative: Lab calling with urinalysis test results and triager can call in prescription    Negative: Medication questions    Negative: ED call to PCP    Negative: Physician call to PCP    Negative: Call about patient who is currently hospitalized    Negative: Lab or radiology calling with CRITICAL test results    Negative: [1] Prescription not at pharmacy AND [2] was prescribed today by PCP    Negative: [1] Follow-up call from patient regarding patient's clinical status AND [2] information urgent    Negative: [1] Caller requests to speak ONLY to PCP AND [2] URGENT question    Negative: [1] Caller requests to speak to PCP now AND [2] won't tell us reason for call  (Exception: if 10 pm to 6 am, caller must first discuss reason for the call)    Negative: Notification of hospital admission    Negative: Notification of death    Protocols used: PCP CALL - NO TRIAGE-A-

## 2019-07-29 ENCOUNTER — TELEPHONE (OUTPATIENT)
Dept: ENDOCRINOLOGY | Facility: CLINIC | Age: 54
End: 2019-07-29

## 2019-07-29 NOTE — TELEPHONE ENCOUNTER
Prior Authorization Approval    Authorization Effective Date: 6/26/2019  Authorization Expiration Date: 10/24/2019  Medication: eucrisa-APPROVED  Approved Dose/Quantity:   Reference #:     Insurance Company: [employer] - Phone [phone] Fax [phone]  Expected CoPay:       CoPay Card Available:      Foundation Assistance Needed:    Which Pharmacy is filling the prescription (Not needed for infusion/clinic administered): [facility] - [place], [place] - [facility] [address]  Pharmacy Notified: Yes  Patient Notified: No    Pharmacy will notify patient when medication is ready.

## 2019-07-29 NOTE — TELEPHONE ENCOUNTER
[facility] Call Center    Phone Message    May a detailed message be left on voicemail: yes    Reason for Call: Other: per pt- has a referral for Hyperparathyroidism - per guidelines states to send an ecounter, please call pt thanks!     Action Taken: Message routed to:  [facility] endocrine

## 2019-07-31 ENCOUNTER — TELEPHONE (OUTPATIENT)
Dept: FAMILY MEDICINE | Facility: CLINIC | Age: 54
End: 2019-07-31

## 2019-07-31 NOTE — TELEPHONE ENCOUNTER
Reason for call:  Other   Patient called regarding (reason for call): Patient would like a referral to a homeopathic provider for her thyroid issue. Can't see Endo until October and wants something natural. Niraj said to request this from us so it will be covered.  Additional comments:     Phone number to reach patient:  Cell number on file:    Telephone Information:   Mobile [phone]       Best Time:  any    Can we leave a detailed message on this number?  YES

## 2019-08-01 NOTE — TELEPHONE ENCOUNTER
Spoke with pt and stated most Homeopathic clinics do not take insurances. Referred pt to Dr. Renate Kendall at [facility]. Must make appointments through her web site: [employer]     Pt also called [facility] for an Endocrinology appointment. First available is end of October 2019 and she already has Endo appt at [facility] at beginning of October 2019. Gave pt [facility] in [place] [phone] to see if she can see an Endo Provider sooner.    Janice Carrillo Thompson Referral Rep

## 2019-08-19 NOTE — TELEPHONE ENCOUNTER
RECORDS RECEIVED FROM: [facility]   DATE RECEIVED: 8/19/19   NOTES (FOR ALL VISITS) STATUS DETAILS   OFFICE NOTES from referring provider Chio Conn MD   OFFICE NOTES from other specialist NA    ED NOTES NA    OPERATIVE REPORT  (thyroid, pituitary, adrenal, parathyroid) NA    MEDICATION LIST Epic 7/18/19   IMAGING      DEXASCAN     MRI (BRAIN) PACS 11/24/17   XR (Chest)     CT (HEAD/NECK/CHEST/ABDOMEN)     NUCLEAR      ULTRASOUND (HEAD/NECK)     LABS     DIABETES: HBGA1C, CREATININE, FASTING LIPIDS, MICROALBUMIN URINE, POTASSIUM, TSH, T4    THYROID: TSH, T4, CBC, THYRODLONULIN, TOTAL T3, FREE T4, CALCITONIN, CEA

## 2019-08-23 ENCOUNTER — NURSE TRIAGE (OUTPATIENT)
Dept: NURSING | Facility: CLINIC | Age: 54
End: 2019-08-23

## 2019-08-23 NOTE — TELEPHONE ENCOUNTER
Patient is concerned about her blood pressure.  Patient reports most recently BP is 157/77 with heart rate of 81.  Other blood pressure readings have been 143/91and 143/94.  Patient does not have any other cardiac related symptoms.  Reviewed care advice with caller.  FNA advised caller to monitor symptoms and call back with worsening symptoms or if caller has questions/concerns.  Caller verbalizes understanding.  Transferred call to schedule office visit per guideline or patient request.    Additional Information    Negative: Sounds like a life-threatening emergency to the triager    Negative: Pregnant > 20 weeks and new hand or face swelling    Negative: Pregnant > 20 weeks and BP > 140/90    Negative: Systolic BP >= 160 OR Diastolic >= 100, and any cardiac or neurologic symptoms (e.g., chest pain, difficulty breathing, unsteady gait, blurred vision)    Negative: Patient sounds very sick or weak to the triager    Negative: BP Systolic BP >= 140 OR Diastolic >= 90 and postpartum < 4 weeks    Negative: Systolic BP >= 180 OR Diastolic >= 110, and missed most recent dose of blood pressure medication    Negative: Systolic BP >= 180 OR Diastolic >= 110    Negative: Patient wants to be seen    Negative: Ran out of BP medications    Negative: Taking BP medications and feels is having side effects (e.g., impotence, cough, dizziness)    Negative: Systolic BP >= 160 OR Diastolic >= 100    Negative: Systolic BP >= 130 OR Diastolic >= 80, and pregnant    Negative: Systolic BP >= 130 OR Diastolic >= 80, and is taking BP medications    Systolic BP >= 130 OR Diastolic >= 80, and is not taking BP medications    Protocols used: HIGH BLOOD PRESSURE-A-OH

## 2019-10-02 ENCOUNTER — OFFICE VISIT (OUTPATIENT)
Dept: ENDOCRINOLOGY | Facility: CLINIC | Age: 54
End: 2019-10-02
Attending: FAMILY MEDICINE
Payer: COMMERCIAL

## 2019-10-02 ENCOUNTER — APPOINTMENT (OUTPATIENT)
Dept: LAB | Facility: CLINIC | Age: 54
End: 2019-10-02
Payer: COMMERCIAL

## 2019-10-02 ENCOUNTER — PRE VISIT (OUTPATIENT)
Dept: ENDOCRINOLOGY | Facility: CLINIC | Age: 54
End: 2019-10-02

## 2019-10-02 VITALS
SYSTOLIC BLOOD PRESSURE: 134 MMHG | BODY MASS INDEX: 29.59 KG/M2 | WEIGHT: 160.8 LBS | HEART RATE: 82 BPM | HEIGHT: 62 IN | DIASTOLIC BLOOD PRESSURE: 83 MMHG

## 2019-10-02 DIAGNOSIS — E21.0 PRIMARY HYPERPARATHYROIDISM (H): ICD-10-CM

## 2019-10-02 DIAGNOSIS — E83.52 HYPERCALCEMIA: Primary | ICD-10-CM

## 2019-10-02 DIAGNOSIS — E21.3 PARATHYROID HORMONE EXCESS (H): ICD-10-CM

## 2019-10-02 ASSESSMENT — MIFFLIN-ST. JEOR: SCORE: 1272.63

## 2019-10-02 ASSESSMENT — PAIN SCALES - GENERAL: PAINLEVEL: NO PAIN (0)

## 2019-10-02 NOTE — PROGRESS NOTES
Endocrine Consult note    Attending Assessment/Plan :     hypercalcemia with high PTH . Differential primary hyperparathyroidism vs FHH.  24 hour urine calcium following the appt is more consistent with primary hyperparathyroidism.    DXA  Low threshold for parathyroid US- I didn't order this today  Guideline criteria for operation includes calcium > 1 mg/dl over normal, kidney stones, osteoporosis, declining renal function, age < 50.  She is only 54.  Depending on the results ofother tests and the ease of fixng this (for example if we saw obvious parathyroid adenoma on US) we might consider operation for more mild disease.     Overweight BMI 29    Lynda Longo MD    Chief complaint:  Charu is a 54 year old female seen in consultation at the request of Dr Chio Tovar for hyperparathyroidism.    HISTORY OF PRESENT ILLNESS    We have calcium data dating to 9/26/16.  High calcium has been intermittently noted since 11/16/17.  On 7/1/09 she had calcium 10.5 (upper normal).     Charu reports she has had pain in the kidney region  In the past.  She has never passed a kidney stone.  She has never fractured a bone.  She has never had a bone density test.      She used to take calicum but not now.  She stopped it a year ago    We have current relevant labs  7/2/19 Ca 10.5, alk phos 160  7/18/19 iCa 5.5,   7/2/19 vitamin D 28    REVIEW OF SYSTEMS  Can't lose weight despite healthy diet  Maximum adult height 5'3; her height was NOT measured today  Sleep issues in the past - takes melatonin for this  Energy is good  Nose problem during the night - puts vicks on the nose -  Mouth breathing at night  Cardiac: sometimes heart pain - has been evaluated  And OK  Respiratory: negative;   GI: negative  No bone pain  Hand pain - does acupuncture for this  Rash on pretibial x 2 years - pruritic  10 system ROS otherwise as per the HPI or negative    Past Medical History  Past Medical History:   Diagnosis Date      "Hypercalcemia 2017     Hypovitaminosis D 2016     Past Surgical History:   Procedure Laterality Date     TUBAL LIGATION       Medications    Current Outpatient Medications   Medication Sig Dispense Refill     VITAMIN D, CHOLECALCIFEROL, PO Take by mouth daily       crisaborole (EUCRISA) 2 % ointment Apply topically 2 times daily (Patient not taking: Reported on 10/2/2019) 60 g 1     GLUCOSAMINE SULFATE PO        valACYclovir (VALTREX) 1000 mg tablet Take 1 tablet (1,000 mg) by mouth 3 times daily (Patient not taking: Reported on 7/18/2019) 21 tablet 0     Vitamin D dose ? 2000 international unit(s)/day  She     Allergies  No Known Allergies    Family History  family history includes Diabetes (age of onset: 25) in her son.  No neck operation    Social History  Social History     Tobacco Use     Smoking status: Never Smoker     Smokeless tobacco: Never Used   Substance Use Topics     Alcohol use: No     Drug use: No     Originally from [facility];     Physical Exam  /83   Pulse 82   Ht 1.575 m (5' 2\")   Wt 72.9 kg (160 lb 12.8 oz)   BMI 29.41 kg/m    Body mass index is 29.41 kg/m .  GENERAL : pleasant woman  In no apparent distress  SKIN: Normal color, normal temperature, texture.  No hirsutism, alopecia or purple striae.   Pretibial variegated/serpiginous pink rash patch approx 10 cm  EYES: PERRL, EOMI, No scleral icterus,  No proptosis, conjunctival redness, stare, retraction  MOUTH: Moist, pink; pharynx clear  NECK: No visible masses. No palpable adenopathy, or masses. No carotid bruits  . THYROID:  Not palpable   RESP: Lungs clear to auscultation bilaterally  CARDIAC: Regular rate and rhythm, normal S1 S2, without murmurs, rubs or gallops    ABDOMEN: centrally obese; Normal bowel sounds; soft, nontender, no HSM or masses       NEURO: awake, alert, responds appropriately to questions.  Cranial nerves intact.  Moves all extremities; Gait normal.  No tremor of the outstretched hand.  DTRs  2 /4 , "   EXTREMITIES: No clubbing, cyanosis or edema.    DATA REVIEW    ENDO CALCIUM LABS-[facility] Latest Ref Rng & Units 10/4/2019   CALCIUM 8.5 - 10.1 mg/dL    CALCIUM IONIZED WHOLE BLOOD 4.4 - 5.2 mg/dL    ALBUMIN 3.4 - 5.0 g/dL    BUN 7 - 30 mg/dL    CREATININE 0.52 - 1.04 mg/dL    PARATHYROID HORMONE INTACT 18 - 80 pg/mL    ALKPHOS 40 - 150 U/L    VITAMIN D DEFICIENCY SCREENING 20 - 75 ug/L    PROTEIN, TOTAL 6.8 - 8.8 g/dL    CALCIUM URINE G/24 H 0.10 - 0.30 g/24 h 0.25   CALCIUM URINE G/G CR g/g Cr 0.32   CALCIUM URINE MG/DL mg/dL 13.4     ENDO CALCIUM LABS-[facility] Latest Ref Rng & Units 7/18/2019 7/2/2019   CALCIUM 8.5 - 10.1 mg/dL  10.5 (H)   CALCIUM IONIZED WHOLE BLOOD 4.4 - 5.2 mg/dL 5.5 (H)    ALBUMIN 3.4 - 5.0 g/dL  4.1   BUN 7 - 30 mg/dL  14   CREATININE 0.52 - 1.04 mg/dL  0.64   PARATHYROID HORMONE INTACT 18 - 80 pg/mL 206 (H)    ALKPHOS 40 - 150 U/L  160 (H)   VITAMIN D DEFICIENCY SCREENING 20 - 75 ug/L  28   PROTEIN, TOTAL 6.8 - 8.8 g/dL  8.2   CALCIUM URINE G/24 H 0.10 - 0.30 g/24 h     CALCIUM URINE G/G CR g/g Cr     CALCIUM URINE MG/DL mg/dL       ENDO CALCIUM LABS-[facility] Latest Ref Rng & Units 11/30/2018   CALCIUM 8.5 - 10.1 mg/dL 10.4 (H)   CALCIUM IONIZED WHOLE BLOOD 4.4 - 5.2 mg/dL    ALBUMIN 3.4 - 5.0 g/dL 3.9   BUN 7 - 30 mg/dL 11   CREATININE 0.52 - 1.04 mg/dL 0.57   PARATHYROID HORMONE INTACT 18 - 80 pg/mL    ALKPHOS 40 - 150 U/L 130   VITAMIN D DEFICIENCY SCREENING 20 - 75 ug/L 21   PROTEIN, TOTAL 6.8 - 8.8 g/dL 8.1   CALCIUM URINE G/24 H 0.10 - 0.30 g/24 h    CALCIUM URINE G/G CR g/g Cr    CALCIUM URINE MG/DL mg/dL      ENDO CALCIUM LABS-[facility] Latest Ref Rng & Units 11/24/2017   CALCIUM 8.5 - 10.1 mg/dL 9.9   CALCIUM IONIZED WHOLE BLOOD 4.4 - 5.2 mg/dL    ALBUMIN 3.4 - 5.0 g/dL 3.4   BUN 7 - 30 mg/dL 11   CREATININE 0.52 - 1.04 mg/dL 0.65   PARATHYROID HORMONE INTACT 18 - 80 pg/mL    ALKPHOS 40 - 150 U/L 151 (H)   VITAMIN D DEFICIENCY SCREENING 20 - 75 ug/L    PROTEIN, TOTAL 6.8 - 8.8 g/dL 7.5   CALCIUM  URINE G/24 H 0.10 - 0.30 g/24 h    CALCIUM URINE G/G CR g/g Cr    CALCIUM URINE MG/DL mg/dL      ENDO CALCIUM LABS-[facility] Latest Ref Rng & Units 11/16/2017   CALCIUM 8.5 - 10.1 mg/dL 10.8 (H)   CALCIUM IONIZED WHOLE BLOOD 4.4 - 5.2 mg/dL    ALBUMIN 3.4 - 5.0 g/dL 4.1   BUN 7 - 30 mg/dL 11   CREATININE 0.52 - 1.04 mg/dL 0.67   PARATHYROID HORMONE INTACT 18 - 80 pg/mL    ALKPHOS 40 - 150 U/L 142   VITAMIN D DEFICIENCY SCREENING 20 - 75 ug/L 34   PROTEIN, TOTAL 6.8 - 8.8 g/dL 8.4   CALCIUM URINE G/24 H 0.10 - 0.30 g/24 h    CALCIUM URINE G/G CR g/g Cr    CALCIUM URINE MG/DL mg/dL

## 2019-10-02 NOTE — LETTER
10/2/2019       RE: Charu Lerma  [address]  [facility] [address]     Dear Colleague,    Thank you for referring your patient, Charu Lerma, to the [facility] ENDOCRINOLOGY at [facility]. Please see a copy of my visit note below.    Endocrine Consult note    Attending Assessment/Plan :     hypercalcemia with high PTH . Differential primary hyperparathyroidism vs FHH.  24 hour urine calcium following the appt is more consistent with primary hyperparathyroidism.    DXA  Low threshold for parathyroid US- I didn't order this today  Guideline criteria for operation includes calcium > 1 mg/dl over normal, kidney stones, osteoporosis, declining renal function, age < 50.  She is only 54.  Depending on the results ofother tests and the ease of fixng this (for example if we saw obvious parathyroid adenoma on US) we might consider operation for more mild disease.     Overweight BMI 29    Lynda Longo MD    Chief complaint:  Charu is a 54 year old female seen in consultation at the request of Dr Chio Tovar for hyperparathyroidism.    HISTORY OF PRESENT ILLNESS    We have calcium data dating to 9/26/16.  High calcium has been intermittently noted since 11/16/17.  On 7/1/09 she had calcium 10.5 (upper normal).     Charu reports she has had pain in the kidney region  In the past.  She has never passed a kidney stone.  She has never fractured a bone.  She has never had a bone density test.      She used to take calicum but not now.  She stopped it a year ago    We have current relevant labs  7/2/19 Ca 10.5, alk phos 160  7/18/19 iCa 5.5,   7/2/19 vitamin D 28    REVIEW OF SYSTEMS  Can't lose weight despite healthy diet  Maximum adult height 5'3; her height was NOT measured today  Sleep issues in the past - takes melatonin for this  Energy is good  Nose problem during the night - puts vicks on the nose -  Mouth breathing at  "night  Cardiac: sometimes heart pain - has been evaluated  And OK  Respiratory: negative;   GI: negative  No bone pain  Hand pain - does acupuncture for this  Rash on pretibial x 2 years - pruritic  10 system ROS otherwise as per the HPI or negative    Past Medical History  Past Medical History:   Diagnosis Date     Hypercalcemia 2017     Hypovitaminosis D 2016     Past Surgical History:   Procedure Laterality Date     TUBAL LIGATION       Medications    Current Outpatient Medications   Medication Sig Dispense Refill     VITAMIN D, CHOLECALCIFEROL, PO Take by mouth daily       crisaborole (EUCRISA) 2 % ointment Apply topically 2 times daily (Patient not taking: Reported on 10/2/2019) 60 g 1     GLUCOSAMINE SULFATE PO        valACYclovir (VALTREX) 1000 mg tablet Take 1 tablet (1,000 mg) by mouth 3 times daily (Patient not taking: Reported on 7/18/2019) 21 tablet 0     Vitamin D dose ? 2000 international unit(s)/day  She     Allergies  No Known Allergies    Family History  family history includes Diabetes (age of onset: 25) in her son.  No neck operation    Social History  Social History     Tobacco Use     Smoking status: Never Smoker     Smokeless tobacco: Never Used   Substance Use Topics     Alcohol use: No     Drug use: No     Originally from [facility];     Physical Exam  /83   Pulse 82   Ht 1.575 m (5' 2\")   Wt 72.9 kg (160 lb 12.8 oz)   BMI 29.41 kg/m     Body mass index is 29.41 kg/m .  GENERAL : pleasant woman  In no apparent distress  SKIN: Normal color, normal temperature, texture.  No hirsutism, alopecia or purple striae.   Pretibial variegated/serpiginous pink rash patch approx 10 cm  EYES: PERRL, EOMI, No scleral icterus,  No proptosis, conjunctival redness, stare, retraction  MOUTH: Moist, pink; pharynx clear  NECK: No visible masses. No palpable adenopathy, or masses. No carotid bruits  . THYROID:  Not palpable   RESP: Lungs clear to auscultation bilaterally  CARDIAC: Regular rate and " rhythm, normal S1 S2, without murmurs, rubs or gallops    ABDOMEN: centrally obese; Normal bowel sounds; soft, nontender, no HSM or masses       NEURO: awake, alert, responds appropriately to questions.  Cranial nerves intact.  Moves all extremities; Gait normal.  No tremor of the outstretched hand.  DTRs  2 /4 ,   EXTREMITIES: No clubbing, cyanosis or edema.    DATA REVIEW    ENDO CALCIUM LABS-[facility] Latest Ref Rng & Units 10/4/2019   CALCIUM 8.5 - 10.1 mg/dL    CALCIUM IONIZED WHOLE BLOOD 4.4 - 5.2 mg/dL    ALBUMIN 3.4 - 5.0 g/dL    BUN 7 - 30 mg/dL    CREATININE 0.52 - 1.04 mg/dL    PARATHYROID HORMONE INTACT 18 - 80 pg/mL    ALKPHOS 40 - 150 U/L    VITAMIN D DEFICIENCY SCREENING 20 - 75 ug/L    PROTEIN, TOTAL 6.8 - 8.8 g/dL    CALCIUM URINE G/24 H 0.10 - 0.30 g/24 h 0.25   CALCIUM URINE G/G CR g/g Cr 0.32   CALCIUM URINE MG/DL mg/dL 13.4     ENDO CALCIUM LABS-[facility] Latest Ref Rng & Units 7/18/2019 7/2/2019   CALCIUM 8.5 - 10.1 mg/dL  10.5 (H)   CALCIUM IONIZED WHOLE BLOOD 4.4 - 5.2 mg/dL 5.5 (H)    ALBUMIN 3.4 - 5.0 g/dL  4.1   BUN 7 - 30 mg/dL  14   CREATININE 0.52 - 1.04 mg/dL  0.64   PARATHYROID HORMONE INTACT 18 - 80 pg/mL 206 (H)    ALKPHOS 40 - 150 U/L  160 (H)   VITAMIN D DEFICIENCY SCREENING 20 - 75 ug/L  28   PROTEIN, TOTAL 6.8 - 8.8 g/dL  8.2   CALCIUM URINE G/24 H 0.10 - 0.30 g/24 h     CALCIUM URINE G/G CR g/g Cr     CALCIUM URINE MG/DL mg/dL       ENDO CALCIUM LABS-[facility] Latest Ref Rng & Units 11/30/2018   CALCIUM 8.5 - 10.1 mg/dL 10.4 (H)   CALCIUM IONIZED WHOLE BLOOD 4.4 - 5.2 mg/dL    ALBUMIN 3.4 - 5.0 g/dL 3.9   BUN 7 - 30 mg/dL 11   CREATININE 0.52 - 1.04 mg/dL 0.57   PARATHYROID HORMONE INTACT 18 - 80 pg/mL    ALKPHOS 40 - 150 U/L 130   VITAMIN D DEFICIENCY SCREENING 20 - 75 ug/L 21   PROTEIN, TOTAL 6.8 - 8.8 g/dL 8.1   CALCIUM URINE G/24 H 0.10 - 0.30 g/24 h    CALCIUM URINE G/G CR g/g Cr    CALCIUM URINE MG/DL mg/dL      ENDO CALCIUM LABS[facility] Latest Ref Rng & Units 11/24/2017   CALCIUM 8.5 - 10.1  mg/dL 9.9   CALCIUM IONIZED WHOLE BLOOD 4.4 - 5.2 mg/dL    ALBUMIN 3.4 - 5.0 g/dL 3.4   BUN 7 - 30 mg/dL 11   CREATININE 0.52 - 1.04 mg/dL 0.65   PARATHYROID HORMONE INTACT 18 - 80 pg/mL    ALKPHOS 40 - 150 U/L 151 (H)   VITAMIN D DEFICIENCY SCREENING 20 - 75 ug/L    PROTEIN, TOTAL 6.8 - 8.8 g/dL 7.5   CALCIUM URINE G/24 H 0.10 - 0.30 g/24 h    CALCIUM URINE G/G CR g/g Cr    CALCIUM URINE MG/DL mg/dL      ENDO CALCIUM LABS[facility] Latest Ref Rng & Units 11/16/2017   CALCIUM 8.5 - 10.1 mg/dL 10.8 (H)   CALCIUM IONIZED WHOLE BLOOD 4.4 - 5.2 mg/dL    ALBUMIN 3.4 - 5.0 g/dL 4.1   BUN 7 - 30 mg/dL 11   CREATININE 0.52 - 1.04 mg/dL 0.67   PARATHYROID HORMONE INTACT 18 - 80 pg/mL    ALKPHOS 40 - 150 U/L 142   VITAMIN D DEFICIENCY SCREENING 20 - 75 ug/L 34   PROTEIN, TOTAL 6.8 - 8.8 g/dL 8.4   CALCIUM URINE G/24 H 0.10 - 0.30 g/24 h    CALCIUM URINE G/G CR g/g Cr    CALCIUM URINE MG/DL mg/dL    Again, thank you for allowing me to participate in the care of your patient.      Sincerely,    Lauren Longo MD

## 2019-10-04 DIAGNOSIS — E83.52 HYPERCALCEMIA: ICD-10-CM

## 2019-10-04 LAB
CALCIUM 24H UR-MRATE: 0.25 G/24 H (ref 0.1–0.3)
CALCIUM UR-MCNC: 13.4 MG/DL
CALCIUM/CREAT UR: 0.32 G/G CR
COLLECT DURATION TIME UR: 24 H
CREAT 24H UR-MRATE: 0.79 G/(24.H) (ref 0.8–1.8)
CREAT UR-MCNC: 42 MG/DL
SPECIMEN VOL UR: 1890 ML

## 2019-10-05 PROBLEM — E21.0 PRIMARY HYPERPARATHYROIDISM (H): Status: ACTIVE | Noted: 2019-10-05

## 2019-10-05 PROBLEM — E21.3 PARATHYROID HORMONE EXCESS (H): Status: ACTIVE | Noted: 2019-10-05

## 2019-10-05 PROBLEM — E83.52 HYPERCALCEMIA: Status: ACTIVE | Noted: 2019-10-05

## 2019-10-09 ENCOUNTER — TELEPHONE (OUTPATIENT)
Dept: ENDOCRINOLOGY | Facility: CLINIC | Age: 54
End: 2019-10-09

## 2019-10-10 NOTE — TELEPHONE ENCOUNTER
Clinic Action Needed:Yes,please return call    Reason for Call: Charu is calling to check on lab results from October 4, 2019.  The results have not been reviewed/released by provider.  Please return call to discuss further, thank you.     Routed to: [facility] Endocrinology Adult [facility]    Cynthia Franco RN  [facility]

## 2019-10-14 ENCOUNTER — TELEPHONE (OUTPATIENT)
Dept: FAMILY MEDICINE | Facility: CLINIC | Age: 54
End: 2019-10-14

## 2019-10-16 ENCOUNTER — ANCILLARY PROCEDURE (OUTPATIENT)
Dept: BONE DENSITY | Facility: CLINIC | Age: 54
End: 2019-10-16
Payer: COMMERCIAL

## 2019-10-16 DIAGNOSIS — E83.52 HYPERCALCEMIA: ICD-10-CM

## 2019-10-16 PROCEDURE — 77080 DXA BONE DENSITY AXIAL: CPT | Performed by: INTERNAL MEDICINE

## 2020-03-01 ENCOUNTER — HEALTH MAINTENANCE LETTER (OUTPATIENT)
Age: 55
End: 2020-03-01

## 2020-04-27 NOTE — PROGRESS NOTES
"Charu Lerma is a 54 year old female who is being evaluated via a billable video visit.      The patient has been notified of following:     \"This video visit will be conducted via a call between you and your physician/provider. We have found that certain health care needs can be provided without the need for an in-person physical exam.  This service lets us provide the care you need with a video conversation.  If a prescription is necessary we can send it directly to your pharmacy.  If lab work is needed we can place an order for that and you can then stop by our lab to have the test done at a later time.    Video visits are billed at different rates depending on your insurance coverage.  Please reach out to your insurance provider with any questions.    If during the course of the call the physician/provider feels a video visit is not appropriate, you will not be charged for this service.\"    Patient has given verbal consent for Video visit? Yes    How would you like to obtain your AVS? [facility]    Patient would like the video invitation sent by: Text to cell phone: [phone]    Video-Visit Details    Type of service:  Video Visit    Distant Location (provider location):  [facility] ENDOCRINOLOGY     Mode of Communication:  Video Conference via [facility]      Priya Patel MA        "

## 2020-04-28 ENCOUNTER — VIRTUAL VISIT (OUTPATIENT)
Dept: ENDOCRINOLOGY | Facility: CLINIC | Age: 55
End: 2020-04-28

## 2020-04-28 DIAGNOSIS — E21.0 PRIMARY HYPERPARATHYROIDISM (H): Primary | ICD-10-CM

## 2020-04-28 DIAGNOSIS — M85.9 LOW BONE DENSITY: ICD-10-CM

## 2020-04-28 NOTE — PROGRESS NOTES
Endocrine video visit progress note    Attending Assessment/Plan :     primary hyperparathyroidism . She doesn't strictly meet criteria for surgical treatment but her results are borderline enough and she is young enough, I would consider it.   Parathyroid US  DXA wrist which was missed with last order  Follow up labs       Addendum:   Review of parathyroid US 8/20/2020; The study has no cine. No parathyoid adenoma candidates are seen   I am not satisfied with the quality of this study.      Low bone density  . Wrist was ordered with the original DXA but not done.      Overweight BMI 29    Due to the COVID 19 pandemic this visit was converted to a telephone visit in order to help prevent spread of infection in this high risk patient and the general population. The patient gave verbal consent for the telephone visit today.    Start time  1342- no answer by 1349.  Call to patient 1350.  She didn't get message.  She didn't download [facility].  Send new invite as [employer] video visit 1351  Stop time 1404  Total time 13 minutes    Lynda Longo MD    Chief complaint/HISTORY OF PRESENT ILLNESS  Charu returns for follow up of hypercalcemia with high PTH. I  Have seen her once before, on 10/2/2019.  Since then, she has had a number of additional tests, including DXA and 24 hour urine collection for calcium measurement.      We have calcium data dating to 9/26/16.  High calcium has been intermittently noted since 11/16/17.  On 7/1/09 she had calcium 10.5 (upper normal).  The most recent labs were 7/2019 Ca 10.5, creatinine 0.64, iCa 5.5.      Charu again reports she has had pain in the kidney region.  She has never passed a kidney stone.  She has never fractured a bone.    Today she repeatedly asked me what she could do about this. :She has been reading on it and she is asking about potassium. She has started a supplement.      We have current relevant labs  7/2/19 Ca 10.5, alk phos 160  7/18/19 iCa 5.5,   7/2/19  vitamin D 28, Ca 10.5, creatinine 0.64,  Alk phos 160  7/18/19 iCa 5.5  10/4/19: 24 hour urine calcium 250 mg/24 hours;     10/16/19 DXA  Lowest T-score -2.1 L spine  Femoral neck 0.851  fRAX    REVIEW OF SYSTEMS  Sleep is a problem  A little flank pain close to kidneys  Negative cardiac, respiratory, GI    Past Medical History  Past Medical History:   Diagnosis Date     Hypercalcemia 2017     Hyperparathyroidism, primary (H)      Hypovitaminosis D 2016     Low bone density 10/2019     Past Surgical History:   Procedure Laterality Date     TUBAL LIGATION       Medications    Current Outpatient Medications   Medication Sig Dispense Refill     VITAMIN D, CHOLECALCIFEROL, PO Take by mouth daily       crisaborole (EUCRISA) 2 % ointment Apply topically 2 times daily (Patient not taking: Reported on 10/2/2019) 60 g 1     GLUCOSAMINE SULFATE PO        valACYclovir (VALTREX) 1000 mg tablet Take 1 tablet (1,000 mg) by mouth 3 times daily (Patient not taking: Reported on 7/18/2019) 21 tablet 0     Vitamin D dose ? 2000 international unit(s)/day.  Drinking 4-5 bottles of water/day  JODO OTC supplement - she has been on it 1-2 months.     Allergies  No Known Allergies    Family History  family history includes Diabetes (age of onset: 25) in her son.  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      Physical Exam  There were no vitals taken for this visit.  There is no height or weight on file to calculate BMI.   BP Readings from Last 1 Encounters:   10/02/19 134/83      Pulse Readings from Last 1 Encounters:   10/02/19 82      Resp Readings from Last 1 Encounters:   07/18/19 14      Temp Readings from Last 1 Encounters:   07/18/19 97.8  F (36.6  C)      SpO2 Readings from Last 1 Encounters:   11/29/18 97%      Wt Readings from  "Last 1 Encounters:   10/02/19 72.9 kg (160 lb 12.8 oz)      Ht Readings from Last 1 Encounters:   10/02/19 1.575 m (5' 2\")     GENERAL: healthy, alert and no distress  EYES: Eyes grossly normal to inspection, conjunctivae and sclerae normal  RESP: no audible wheeze, cough, or visible cyanosis.  No visible retractions or increased work of breathing.  Able to speak fully in complete sentences.  NEURO: Cranial nerves grossly intact, mentation intact and speech normal  PSYCH: mentation appears normal, affect normal/bright, judgement and insight intact, normal speech and appearance well-groomed    BP Readings from Last 1 Encounters:   10/02/19 134/83      Pulse Readings from Last 1 Encounters:   10/02/19 82      Resp Readings from Last 1 Encounters:   19 14      Temp Readings from Last 1 Encounters:   19 97.8  F (36.6  C)      SpO2 Readings from Last 1 Encounters:   18 97%      Wt Readings from Last 1 Encounters:   10/02/19 72.9 kg (160 lb 12.8 oz)      Ht Readings from Last 1 Encounters:   10/02/19 1.575 m (5' 2\")       DATA REVIEW  BONE DENSITOMETRY  [address] [address]  10/16/2019      PATIENT: Charu Lerma  CHART: [id number]   :  1965  AGE:  54 year old  SEX:  female   REFERRING PROVIDER:  Lauren Longo MD     PROCEDURE:  Bone density scanning was performed using DXA technology of the lumbar spine and hip.  Scanning was performed on a Lunar Prodigy scanner.  Reporting is completed in the form of a T-score.  The T-score represents the standard deviation from peak bone mass based on a young healthy adult.     REFERENCE T-SCORES:       Normal                -1.0 and greater                                 Osteopenia         Between -1.0 and -2.5                                           Osteoporosis     -2.5 and less                                       RISK FACTORS:  Post-menopausal  CURRENT TREATMENT:  None " listed     FINDINGS:               Lumbar Spine (L1-L4)       T-score:  -2.1    Degenerative and/or osteosclerotic changes are present, falsely improving result.                   Left Femoral Neck            T-score:  -1.2               Right Femoral Neck          T-score:  -1.3                             Lumbar (L1-L4) BMD: 0.936  Previous:                        Total Hip Mean BMD: 0.938  Previous:      IMPRESSION  Osteopenia (low bone mass)  Recommendations include ensuring adequate daily Calcium and Vitamin D intake     Current NOF guidelines recommend treatment for patients with the following:  - Prior hip or vertebral fracture  - T-score -2.5 or below  - A 10 year risk of any major osteoporotic fracture >20% or 10 year risk of hip fracture >3%, as calculated using the FRAX calculator (www.shef.ac.uk/FRAX).       This patient's risks with the use of FRAX (based on available information) are 3.5 % for major osteoporotic fracture and 0.2 % for hip fracture.   Based on these guidelines, treatment (in addition to calcium and vitamin D) is not recommended for this patient.  While this is meant as an aid to clinical decision-making, clinical judgment must still be used.      Jovanni Swartz MD       US HEAD NECK SOFT TISSUE 8/20/2020 2:30 PM  CLINICAL HISTORY: hyperparathyroidism; Primary hyperparathyroidism  (H). Elevated serum calcium levels.  TECHNIQUE: Thyroid ultrasound.   COMPARISON: None   FINDINGS:  RIGHT lobe: 3.5 x 1.1 x 1.6 cm. Homogeneous echotexture.  Isthmus: 2 mm.  LEFT lobe: 3.6 x 1.1 x 1.4 cm. Homogeneous echotexture.  NECK: No cervical lymphadenopathy.   No parathyroid adenomas are evident.                                                             IMPRESSION:  Normal thyroid. No parathyroid adenomas are sonographically evident in  the neck. No cervical lymphadenopathy.  MONTANA JEREZ MD

## 2020-04-28 NOTE — PATIENT INSTRUCTIONS
The diagnosis is primary hyperparathyroidism.  Your treatment at this time should be lifestyle  Stay hydrated  Weight bearing exercise      I recommend the following tests:  Wrist bone density  Follow up blood test  Parathyroid ultrasound      I will send results through [employer].

## 2020-05-19 ENCOUNTER — OFFICE VISIT (OUTPATIENT)
Dept: URGENT CARE | Facility: URGENT CARE | Age: 55
End: 2020-05-19

## 2020-05-19 ENCOUNTER — VIRTUAL VISIT (OUTPATIENT)
Dept: URGENT CARE | Facility: CLINIC | Age: 55
End: 2020-05-19

## 2020-05-19 ENCOUNTER — NURSE TRIAGE (OUTPATIENT)
Dept: NURSING | Facility: CLINIC | Age: 55
End: 2020-05-19

## 2020-05-19 DIAGNOSIS — R05.9 COUGH: Primary | ICD-10-CM

## 2020-05-19 DIAGNOSIS — R05.9 COUGH: ICD-10-CM

## 2020-05-19 PROCEDURE — 99207 ZZC NO BILLABLE SERVICE THIS VISIT: CPT

## 2020-05-19 PROCEDURE — 99000 SPECIMEN HANDLING OFFICE-LAB: CPT | Performed by: EMERGENCY MEDICINE

## 2020-05-19 PROCEDURE — 99213 OFFICE O/P EST LOW 20 MIN: CPT | Mod: 95 | Performed by: EMERGENCY MEDICINE

## 2020-05-19 PROCEDURE — U0003 INFECTIOUS AGENT DETECTION BY NUCLEIC ACID (DNA OR RNA); SEVERE ACUTE RESPIRATORY SYNDROME CORONAVIRUS 2 (SARS-COV-2) (CORONAVIRUS DISEASE [COVID-19]), AMPLIFIED PROBE TECHNIQUE, MAKING USE OF HIGH THROUGHPUT TECHNOLOGIES AS DESCRIBED BY CMS-2020-01-R: HCPCS | Mod: 90 | Performed by: EMERGENCY MEDICINE

## 2020-05-19 NOTE — TELEPHONE ENCOUNTER
FNA triage call :  Declines .   Presenting problem : Pt states :  No exposure or travel but having having  covid symptoms of  cough , fever ( no thermometer but suspect fever- feels cold , sweats and shrivering at time ) all symptoms started   For 3  days  And headache for 20 hours. .  Currently : denies fever now , 1&0 , eating , and activity are all unchanged , body aches is 5-6/10 ,  headache is up to 8/10 , after OTC is 3-4/10      Guideline used : Covid Suspected A AH   Disposition and recommendations : Pt agrees to stay on home isolation and  Pt will do Urgent care virtual visit and sent to .   Caller verbalizes understanding and denies further questions and will call back if further symptoms to triage or questions  . Laurie Slater RN  - [place] Nurse Advisor     Reason for Disposition    HIGH RISK patient (e.g., age > 64 years, diabetes, heart or lung disease, weak immune system)    Additional Information    Negative: SEVERE difficulty breathing (e.g., struggling for each breath, speaks in single words)    Negative: Difficult to awaken or acting confused (e.g., disoriented, slurred speech)    Negative: Bluish (or gray) lips or face now    Negative: Shock suspected (e.g., cold/pale/clammy skin, too weak to stand, low BP, rapid pulse)    Negative: Sounds like a life-threatening emergency to the triager    Negative: [1] COVID-19 suspected (e.g., cough, fever, shortness of breath) AND [2] mild symptoms AND [3] public health department recommends testing    Negative: COVID-19 and Breastfeeding, questions about    Negative: [1] COVID-19 exposure AND [2] no symptoms    Negative: SEVERE or constant chest pain (Exception: mild central chest pain, present only when coughing)    Negative: MODERATE difficulty breathing (e.g., speaks in phrases, SOB even at rest, pulse 100-120)    Negative: Patient sounds very sick or weak to the triager    Negative: MILD difficulty breathing (e.g.,  minimal/no SOB at rest, SOB with walking, pulse <100)    Negative: Chest pain    Negative: Fever > 103 F (39.4 C)    Negative: [1] Fever > 101 F (38.3 C) AND [2] age > 60    Negative: [1] Fever > 100.0 F (37.8 C) AND [2] bedridden (e.g., nursing home patient, CVA, chronic illness, recovering from surgery)    [facility] Specific Disposition  - REQUIRED: [facility] Specific Patient Instructions  COVID 19 Nurse Triage Plan/Patient Instructions    Please be aware that novel coronavirus (COVID-19) may be circulating in the community. If you develop symptoms such as fever, cough, or SOB or if you have concerns about the presence of another infection including coronavirus (COVID-19), please contact your health care provider or visit [email or web address].       Patient to stay at home and follow home care protocol based instructions.   Patient to have an Urgent Care Telephone Visit with a provider. Follow System Ambulatory Workflow for COVID 19.     Urgent Care Telephone Visits are available between the hours of 8 am to 9 pm. Staff will assist patent in scheduling an appointment for this Urgent Care Telephone Visit.     Call Back If: Your symptoms worsen before you are able to complete your Urgent Care Telephone Visit with a provider.    Thank you for limiting contact with others, wearing a simple mask to cover your cough, practice good hand hygiene habits and accessing our virtual services where possible to limit the spread of this virus.    For more information about COVID19 and options for caring for yourself at home, please visit the CDC website at https://www.cdc.gov/coronavirus/2019-ncov/about/steps-when-sick.html  For more options for care at [facility], please visit our website at [email or web address]    For more information, please use the [place] Department of Health ([facility]) COVID-19 Hotlines (Interpreters available):   Health questions: Phone Number: [phone] or  [phone] and Hours: 7 a.m. to 7 p.m.  Schools and  questions: Phone Number: [phone] or [phone] and Hours 7 a.m. to 7 p.m.    Protocols used: CORONAVIRUS (COVID-19) DIAGNOSED OR [id number] 4.22.20

## 2020-05-19 NOTE — PROGRESS NOTES
HX:  Patient is a 55 y/o female with a 3 day history of cough, fever, sweats, and HA. She does have a history of Hypertension. No SOB.    PE: NAD on phone    IMP: Covid -like sxs with a h/o hypertension. Will test, PCR.    PLAN: Order entered. Patient given scheduling number. Told contact PCP if feeling worse, otherwise testing team will follow.    Time: 15 min

## 2020-05-20 LAB
SARS-COV-2 RNA SPEC QL NAA+PROBE: ABNORMAL
SPECIMEN SOURCE: ABNORMAL

## 2020-05-21 ENCOUNTER — TELEPHONE (OUTPATIENT)
Dept: URGENT CARE | Facility: URGENT CARE | Age: 55
End: 2020-05-21

## 2020-05-21 NOTE — TELEPHONE ENCOUNTER
Coronavirus (COVID-19) Notification    Patient  Charu Lerma    Reason for call  Notify of Positive Coronavirus (COVID-19) lab results, assess symptoms,  review [facility] recommendations    Lab Result    Lab test:  2019-nCoV rRt-PCR or SARS-CoV-2 PCR    Oropharyngeal AND/OR nasopharyngeal swabs is POSITIVE for 2019-nCoV RNA/SARS-COV-2 PCR (COVID-19 virus)    RN Recommendations/Instructions per [facility] Coronavirus COVID-19 recommendations    Brief introduction script  Hi, My name is Alice and I am calling on behalf of [employer].  We were notified that your Coronavirus test (COVID-19) for was POSITIVE for the virus.  I have some information to relay to you but first I wanted to mention that the [place] Dept of Health will be contacting you shortly [it's possible MD already called Patient] to talk to you more about how you are feeling and other people you have had contact with who might now also have the virus.  Also, [facility] is Partnering with the [facility] for Covid-19 research, you may be contacted directly by research staff.    Assessment (Inquire about Patient's current symptoms)  Back pain, headache, fever.    If at time of call, Patients symptoms hare worsened, the Patient should contact 911 or have someone drive them to Emergency Dept promptly:      If Patient calling 911, inform 911 personal that you have tested positive for the Coronavirus (COVID-19).  Place mask on and await 911 to arrive.    If Emergency Dept, If possible, please have another adult drive you to the Emergency Dept but you need to wear mask when in contact with other people.      Review information with Patient    Since you tested POSITIVE for the COVID-19 virus, it is important that you protect others from being exposed and infected with this virus.    [For safety, it's very important to follow these rules.]    First, stay home and away from others (self-isolate) until:    You've had no  fever--and no medicine that reduces fever--for 3 full days (72 hours). And      Your other symptoms have gotten better. For example, your cough or breathing has improved. And     At least 10 days have passed since your symptoms started.    During this time:    Stay in your own room (and use your own bathroom), if you can.    Stay away from others in your home. No hugging, kissing or shaking hands.    Don't let anyone visit.    Don't go to work, school or anywhere else.     Clean  high touch  surfaces often (doorknobs, counters, handles, etc.). Use a household cleaning spray or wipes.    Cover your mouth and nose with a mask, tissue or washcloth to avoid spreading germs.    Wash your hands and face often with soap and water.    You should not go back to work until you meet the guidelines above for ending your home isolation. You should meet these along with any other guidelines that your employer has.  Employers: This document serves as formal notice of your employee's medical guidelines for going back to work. They must meet the above guidelines before going back to work in person.      How can I take care of myself?  1. Get lots of rest. Drink extra fluids (unless a doctor has told you not to).    2. Take Tylenol (acetaminophen) for fever or pain. If you have liver or kidney problems, ask your family doctor if it's okay to take Tylenol.     Take either:     650 mg (two 325 mg pills) every 4 to 6 hours, or     1,000 mg (two 500 mg pills) every 8 hours as needed.     Note: Don't take more than 3,000 mg in one day. Acetaminophen is found in many medicines (both prescribed and over-the-counter medicines). Read all labels to be sure you don't take too much.  For children, check the Tylenol bottle for the right dose. The dose is based on the child's age or weight.  3. If you have other health problems (like cancer, heart failure, an organ transplant or severe kidney disease): Call your specialty clinic if you don't feel  better in the next 2 days.    4. Know when to call 911: If your breathing is so bad that it keeps you from doing normal activities, call 911 or go to the emergency room. Tell them that you've been staying home and may have COVID-19.    5. Sign up for [employer]. We know it's scary to hear that you have COVID-19. We want to track your symptoms to make sure you're okay over the next 2 weeks. Please look for an email from [employer]--this is a free, online program that we'll use to keep in touch. To sign up, follow the link in the email. Learn more at [email or web address].      Where can I get more information?    To learn the [place]'s guidelines for staying home, please visit the [facility] website at [email or web address]/[email or web address].    To learn more about COVID-19 and how to care for yourself at home, please visit the CDC website at https://www.cdc.gov/coronavirus/2019-ncov/about/steps-when-sick.html.    For more options for care at [facility], please visit our website at [email or web address]      [place] Dept of Health ([facility]) COVID-19 Hotline:   [phone]    Positive COVID-19 letter sent (Yes/No):  Yes      Alice Higgins RN  Covid-19 Results Team  [phone]

## 2020-05-28 ENCOUNTER — NURSE TRIAGE (OUTPATIENT)
Dept: NURSING | Facility: CLINIC | Age: 55
End: 2020-05-28

## 2020-05-28 ENCOUNTER — APPOINTMENT (OUTPATIENT)
Dept: GENERAL RADIOLOGY | Facility: CLINIC | Age: 55
DRG: 177 | End: 2020-05-28
Attending: EMERGENCY MEDICINE
Payer: OTHER GOVERNMENT

## 2020-05-28 ENCOUNTER — HOSPITAL ENCOUNTER (INPATIENT)
Facility: CLINIC | Age: 55
LOS: 2 days | Discharge: HOME | DRG: 177 | End: 2020-05-30
Attending: EMERGENCY MEDICINE | Admitting: HOSPITALIST
Payer: OTHER GOVERNMENT

## 2020-05-28 DIAGNOSIS — J12.82 PNEUMONIA DUE TO COVID-19 VIRUS: ICD-10-CM

## 2020-05-28 DIAGNOSIS — J06.9 ACUTE RESPIRATORY DISEASE DUE TO COVID-19 VIRUS: Primary | ICD-10-CM

## 2020-05-28 DIAGNOSIS — U07.1 PNEUMONIA DUE TO COVID-19 VIRUS: ICD-10-CM

## 2020-05-28 DIAGNOSIS — U07.1 ACUTE RESPIRATORY DISEASE DUE TO COVID-19 VIRUS: Primary | ICD-10-CM

## 2020-05-28 LAB
ALBUMIN SERPL-MCNC: 3.2 G/DL (ref 3.4–5)
ALP SERPL-CCNC: 107 U/L (ref 40–150)
ALT SERPL W P-5'-P-CCNC: 29 U/L (ref 0–50)
ANION GAP SERPL CALCULATED.3IONS-SCNC: 8 MMOL/L (ref 3–14)
AST SERPL W P-5'-P-CCNC: 23 U/L (ref 0–45)
BASE EXCESS BLDV CALC-SCNC: 0 MMOL/L
BASOPHILS # BLD AUTO: 0 10E9/L (ref 0–0.2)
BASOPHILS NFR BLD AUTO: 0.1 %
BILIRUB SERPL-MCNC: 0.4 MG/DL (ref 0.2–1.3)
BUN SERPL-MCNC: 9 MG/DL (ref 7–30)
CALCIUM SERPL-MCNC: 10.5 MG/DL (ref 8.5–10.1)
CHLORIDE SERPL-SCNC: 101 MMOL/L (ref 94–109)
CO2 SERPL-SCNC: 23 MMOL/L (ref 20–32)
CREAT SERPL-MCNC: 0.74 MG/DL (ref 0.52–1.04)
CRP SERPL-MCNC: 126 MG/L (ref 0–8)
D DIMER PPP FEU-MCNC: 0.5 UG/ML FEU (ref 0–0.5)
DIFFERENTIAL METHOD BLD: NORMAL
EOSINOPHIL # BLD AUTO: 0 10E9/L (ref 0–0.7)
EOSINOPHIL NFR BLD AUTO: 0 %
ERYTHROCYTE [DISTWIDTH] IN BLOOD BY AUTOMATED COUNT: 13.3 % (ref 10–15)
GFR SERPL CREATININE-BSD FRML MDRD: >90 ML/MIN/{1.73_M2}
GLUCOSE SERPL-MCNC: 101 MG/DL (ref 70–99)
HCO3 BLDV-SCNC: 24 MMOL/L (ref 21–28)
HCT VFR BLD AUTO: 36.9 % (ref 35–47)
HGB BLD-MCNC: 12.3 G/DL (ref 11.7–15.7)
IMM GRANULOCYTES # BLD: 0 10E9/L (ref 0–0.4)
IMM GRANULOCYTES NFR BLD: 0.3 %
LACTATE BLD-SCNC: 0.9 MMOL/L (ref 0.7–2)
LYMPHOCYTES # BLD AUTO: 1.4 10E9/L (ref 0.8–5.3)
LYMPHOCYTES NFR BLD AUTO: 15.1 %
MAGNESIUM SERPL-MCNC: 2.3 MG/DL (ref 1.6–2.3)
MCH RBC QN AUTO: 29.7 PG (ref 26.5–33)
MCHC RBC AUTO-ENTMCNC: 33.3 G/DL (ref 31.5–36.5)
MCV RBC AUTO: 89 FL (ref 78–100)
MONOCYTES # BLD AUTO: 0.9 10E9/L (ref 0–1.3)
MONOCYTES NFR BLD AUTO: 9.1 %
NEUTROPHILS # BLD AUTO: 7.1 10E9/L (ref 1.6–8.3)
NEUTROPHILS NFR BLD AUTO: 75.4 %
NRBC # BLD AUTO: 0 10*3/UL
NRBC BLD AUTO-RTO: 0 /100
PCO2 BLDV: 37 MM HG (ref 40–50)
PH BLDV: 7.43 PH (ref 7.32–7.43)
PLATELET # BLD AUTO: 351 10E9/L (ref 150–450)
PO2 BLDV: 24 MM HG (ref 25–47)
POTASSIUM SERPL-SCNC: 3.9 MMOL/L (ref 3.4–5.3)
PROCALCITONIN SERPL-MCNC: 0.07 NG/ML
PROT SERPL-MCNC: 8.2 G/DL (ref 6.8–8.8)
RBC # BLD AUTO: 4.14 10E12/L (ref 3.8–5.2)
SODIUM SERPL-SCNC: 132 MMOL/L (ref 133–144)
TROPONIN I SERPL-MCNC: <0.015 UG/L (ref 0–0.04)
WBC # BLD AUTO: 9.4 10E9/L (ref 4–11)

## 2020-05-28 PROCEDURE — 83605 ASSAY OF LACTIC ACID: CPT | Performed by: EMERGENCY MEDICINE

## 2020-05-28 PROCEDURE — 82803 BLOOD GASES ANY COMBINATION: CPT | Performed by: EMERGENCY MEDICINE

## 2020-05-28 PROCEDURE — 25000125 ZZHC RX 250: Performed by: EMERGENCY MEDICINE

## 2020-05-28 PROCEDURE — 84145 PROCALCITONIN (PCT): CPT | Performed by: EMERGENCY MEDICINE

## 2020-05-28 PROCEDURE — 87040 BLOOD CULTURE FOR BACTERIA: CPT | Performed by: EMERGENCY MEDICINE

## 2020-05-28 PROCEDURE — 85379 FIBRIN DEGRADATION QUANT: CPT | Performed by: EMERGENCY MEDICINE

## 2020-05-28 PROCEDURE — 96365 THER/PROPH/DIAG IV INF INIT: CPT

## 2020-05-28 PROCEDURE — 99285 EMERGENCY DEPT VISIT HI MDM: CPT | Mod: 25

## 2020-05-28 PROCEDURE — 84484 ASSAY OF TROPONIN QUANT: CPT | Performed by: EMERGENCY MEDICINE

## 2020-05-28 PROCEDURE — 25000132 ZZH RX MED GY IP 250 OP 250 PS 637: Performed by: EMERGENCY MEDICINE

## 2020-05-28 PROCEDURE — 99223 1ST HOSP IP/OBS HIGH 75: CPT | Mod: AI | Performed by: HOSPITALIST

## 2020-05-28 PROCEDURE — 96375 TX/PRO/DX INJ NEW DRUG ADDON: CPT

## 2020-05-28 PROCEDURE — 85025 COMPLETE CBC W/AUTO DIFF WBC: CPT | Performed by: EMERGENCY MEDICINE

## 2020-05-28 PROCEDURE — 86140 C-REACTIVE PROTEIN: CPT | Performed by: EMERGENCY MEDICINE

## 2020-05-28 PROCEDURE — 80053 COMPREHEN METABOLIC PANEL: CPT | Performed by: EMERGENCY MEDICINE

## 2020-05-28 PROCEDURE — 83735 ASSAY OF MAGNESIUM: CPT | Performed by: EMERGENCY MEDICINE

## 2020-05-28 PROCEDURE — 12000011 ZZH R&B MS OVERFLOW

## 2020-05-28 PROCEDURE — 25000128 H RX IP 250 OP 636: Performed by: EMERGENCY MEDICINE

## 2020-05-28 PROCEDURE — 71045 X-RAY EXAM CHEST 1 VIEW: CPT

## 2020-05-28 RX ORDER — MORPHINE SULFATE 2 MG/ML
2 INJECTION, SOLUTION INTRAMUSCULAR; INTRAVENOUS
Status: COMPLETED | OUTPATIENT
Start: 2020-05-28 | End: 2020-05-28

## 2020-05-28 RX ORDER — ACETAMINOPHEN 325 MG/1
TABLET ORAL
Status: DISCONTINUED
Start: 2020-05-28 | End: 2020-05-29 | Stop reason: HOSPADM

## 2020-05-28 RX ORDER — CEFTRIAXONE 2 G/1
2 INJECTION, POWDER, FOR SOLUTION INTRAMUSCULAR; INTRAVENOUS ONCE
Status: COMPLETED | OUTPATIENT
Start: 2020-05-28 | End: 2020-05-29

## 2020-05-28 RX ORDER — ACETAMINOPHEN 325 MG/1
975 TABLET ORAL ONCE
Status: COMPLETED | OUTPATIENT
Start: 2020-05-28 | End: 2020-05-28

## 2020-05-28 RX ORDER — AZITHROMYCIN 500 MG/1
500 INJECTION, POWDER, LYOPHILIZED, FOR SOLUTION INTRAVENOUS ONCE
Status: COMPLETED | OUTPATIENT
Start: 2020-05-28 | End: 2020-05-29

## 2020-05-28 RX ADMIN — CEFTRIAXONE 2 G: 2 INJECTION, POWDER, FOR SOLUTION INTRAMUSCULAR; INTRAVENOUS at 22:35

## 2020-05-28 RX ADMIN — MORPHINE SULFATE 2 MG: 2 INJECTION, SOLUTION INTRAMUSCULAR; INTRAVENOUS at 22:34

## 2020-05-28 RX ADMIN — ACETAMINOPHEN 975 MG: 325 TABLET, FILM COATED ORAL at 21:00

## 2020-05-28 ASSESSMENT — ENCOUNTER SYMPTOMS
CHILLS: 1
COUGH: 1
FEVER: 1
SHORTNESS OF BREATH: 1
GASTROINTESTINAL NEGATIVE: 1

## 2020-05-28 ASSESSMENT — MIFFLIN-ST. JEOR: SCORE: 1269.01

## 2020-05-28 NOTE — TELEPHONE ENCOUNTER
Pt reports worsening symptoms, dx with COVID-19.      Pt is having constant CP, SOB, continuous coughing, a fever (101.6F), and body aches/headache.     Disposition:  ED per protocol, wear a mask.  She verbalized understanding and had no further questions.     COVID 19 Nurse Triage Plan/Patient Instructions    Patient to go to ED and follow protocol based instructions. Follow System Ambulatory Workflow for COVID 19.     Drea Valencia RN/JT    Reason for Disposition    SEVERE or constant chest pain (Exception: mild central chest pain, present only when coughing)    Additional Information    Negative: SEVERE difficulty breathing (e.g., struggling for each breath, speaks in single words)    Negative: Difficult to awaken or acting confused (e.g., disoriented, slurred speech)    Negative: Bluish (or gray) lips or face now    Negative: Shock suspected (e.g., cold/pale/clammy skin, too weak to stand, low BP, rapid pulse)    Negative: Sounds like a life-threatening emergency to the triager    Protocols used: CORONAVIRUS (COVID-19) DIAGNOSED OR [id number] 4.22.20

## 2020-05-29 PROBLEM — J06.9 ACUTE RESPIRATORY DISEASE DUE TO COVID-19 VIRUS: Status: ACTIVE | Noted: 2020-05-29

## 2020-05-29 PROBLEM — U07.1 ACUTE RESPIRATORY DISEASE DUE TO COVID-19 VIRUS: Status: ACTIVE | Noted: 2020-05-29

## 2020-05-29 LAB
ANION GAP SERPL CALCULATED.3IONS-SCNC: 7 MMOL/L (ref 3–14)
BUN SERPL-MCNC: 7 MG/DL (ref 7–30)
CALCIUM SERPL-MCNC: 9.5 MG/DL (ref 8.5–10.1)
CHLORIDE SERPL-SCNC: 111 MMOL/L (ref 94–109)
CO2 SERPL-SCNC: 22 MMOL/L (ref 20–32)
CREAT SERPL-MCNC: 0.55 MG/DL (ref 0.52–1.04)
ERYTHROCYTE [DISTWIDTH] IN BLOOD BY AUTOMATED COUNT: 13.6 % (ref 10–15)
GFR SERPL CREATININE-BSD FRML MDRD: >90 ML/MIN/{1.73_M2}
GLUCOSE BLDC GLUCOMTR-MCNC: 120 MG/DL (ref 70–99)
GLUCOSE SERPL-MCNC: 175 MG/DL (ref 70–99)
HBA1C MFR BLD: 6.3 % (ref 0–5.6)
HCT VFR BLD AUTO: 32.2 % (ref 35–47)
HGB BLD-MCNC: 10.5 G/DL (ref 11.7–15.7)
MCH RBC QN AUTO: 29.4 PG (ref 26.5–33)
MCHC RBC AUTO-ENTMCNC: 32.6 G/DL (ref 31.5–36.5)
MCV RBC AUTO: 90 FL (ref 78–100)
NT-PROBNP SERPL-MCNC: 96 PG/ML (ref 0–900)
PLATELET # BLD AUTO: 311 10E9/L (ref 150–450)
POTASSIUM SERPL-SCNC: 3.3 MMOL/L (ref 3.4–5.3)
PROCALCITONIN SERPL-MCNC: 0.05 NG/ML
RBC # BLD AUTO: 3.57 10E12/L (ref 3.8–5.2)
SODIUM SERPL-SCNC: 140 MMOL/L (ref 133–144)
TROPONIN I SERPL-MCNC: <0.015 UG/L (ref 0–0.04)
WBC # BLD AUTO: 6.3 10E9/L (ref 4–11)

## 2020-05-29 PROCEDURE — 25000125 ZZHC RX 250: Performed by: HOSPITALIST

## 2020-05-29 PROCEDURE — 25000128 H RX IP 250 OP 636: Performed by: EMERGENCY MEDICINE

## 2020-05-29 PROCEDURE — 12000011 ZZH R&B MS OVERFLOW

## 2020-05-29 PROCEDURE — 25800030 ZZH RX IP 258 OP 636: Performed by: HOSPITALIST

## 2020-05-29 PROCEDURE — 99233 SBSQ HOSP IP/OBS HIGH 50: CPT | Performed by: INTERNAL MEDICINE

## 2020-05-29 PROCEDURE — 84484 ASSAY OF TROPONIN QUANT: CPT | Performed by: HOSPITALIST

## 2020-05-29 PROCEDURE — 00000146 ZZHCL STATISTIC GLUCOSE BY METER IP

## 2020-05-29 PROCEDURE — 25000132 ZZH RX MED GY IP 250 OP 250 PS 637: Performed by: INTERNAL MEDICINE

## 2020-05-29 PROCEDURE — 83880 ASSAY OF NATRIURETIC PEPTIDE: CPT | Performed by: HOSPITALIST

## 2020-05-29 PROCEDURE — 25000128 H RX IP 250 OP 636: Performed by: INTERNAL MEDICINE

## 2020-05-29 PROCEDURE — 83036 HEMOGLOBIN GLYCOSYLATED A1C: CPT | Performed by: HOSPITALIST

## 2020-05-29 PROCEDURE — 96367 TX/PROPH/DG ADDL SEQ IV INF: CPT

## 2020-05-29 PROCEDURE — 25000132 ZZH RX MED GY IP 250 OP 250 PS 637: Performed by: HOSPITALIST

## 2020-05-29 PROCEDURE — 84145 PROCALCITONIN (PCT): CPT | Performed by: HOSPITALIST

## 2020-05-29 PROCEDURE — 85027 COMPLETE CBC AUTOMATED: CPT | Performed by: HOSPITALIST

## 2020-05-29 PROCEDURE — 82565 ASSAY OF CREATININE: CPT | Performed by: HOSPITALIST

## 2020-05-29 PROCEDURE — 80048 BASIC METABOLIC PNL TOTAL CA: CPT | Performed by: HOSPITALIST

## 2020-05-29 PROCEDURE — 84132 ASSAY OF SERUM POTASSIUM: CPT | Performed by: INTERNAL MEDICINE

## 2020-05-29 RX ORDER — MAGNESIUM SULFATE HEPTAHYDRATE 40 MG/ML
4 INJECTION, SOLUTION INTRAVENOUS EVERY 4 HOURS PRN
Status: DISCONTINUED | OUTPATIENT
Start: 2020-05-29 | End: 2020-05-30 | Stop reason: HOSPADM

## 2020-05-29 RX ORDER — PROCHLORPERAZINE MALEATE 10 MG
10 TABLET ORAL EVERY 6 HOURS PRN
Status: DISCONTINUED | OUTPATIENT
Start: 2020-05-29 | End: 2020-05-30 | Stop reason: HOSPADM

## 2020-05-29 RX ORDER — POTASSIUM CHLORIDE 1.5 G/1.58G
20-40 POWDER, FOR SOLUTION ORAL
Status: DISCONTINUED | OUTPATIENT
Start: 2020-05-29 | End: 2020-05-30 | Stop reason: HOSPADM

## 2020-05-29 RX ORDER — HYDROMORPHONE HYDROCHLORIDE 1 MG/ML
0.2 INJECTION, SOLUTION INTRAMUSCULAR; INTRAVENOUS; SUBCUTANEOUS
Status: DISCONTINUED | OUTPATIENT
Start: 2020-05-29 | End: 2020-05-30 | Stop reason: HOSPADM

## 2020-05-29 RX ORDER — IBUPROFEN 600 MG/1
600 TABLET, FILM COATED ORAL EVERY 6 HOURS PRN
Status: DISCONTINUED | OUTPATIENT
Start: 2020-05-29 | End: 2020-05-30 | Stop reason: HOSPADM

## 2020-05-29 RX ORDER — ONDANSETRON 4 MG/1
4 TABLET, ORALLY DISINTEGRATING ORAL EVERY 6 HOURS PRN
Status: DISCONTINUED | OUTPATIENT
Start: 2020-05-29 | End: 2020-05-30 | Stop reason: HOSPADM

## 2020-05-29 RX ORDER — POTASSIUM CHLORIDE 7.45 MG/ML
10 INJECTION INTRAVENOUS
Status: DISCONTINUED | OUTPATIENT
Start: 2020-05-29 | End: 2020-05-30 | Stop reason: HOSPADM

## 2020-05-29 RX ORDER — METOCLOPRAMIDE 5 MG/1
10 TABLET ORAL EVERY 6 HOURS PRN
Status: DISCONTINUED | OUTPATIENT
Start: 2020-05-29 | End: 2020-05-30 | Stop reason: HOSPADM

## 2020-05-29 RX ORDER — CEFTRIAXONE 2 G/1
2 INJECTION, POWDER, FOR SOLUTION INTRAMUSCULAR; INTRAVENOUS EVERY 24 HOURS
Status: DISCONTINUED | OUTPATIENT
Start: 2020-05-29 | End: 2020-05-29

## 2020-05-29 RX ORDER — METOCLOPRAMIDE HYDROCHLORIDE 5 MG/ML
10 INJECTION INTRAMUSCULAR; INTRAVENOUS EVERY 6 HOURS PRN
Status: DISCONTINUED | OUTPATIENT
Start: 2020-05-29 | End: 2020-05-30 | Stop reason: HOSPADM

## 2020-05-29 RX ORDER — AMOXICILLIN 250 MG
1 CAPSULE ORAL 2 TIMES DAILY PRN
Status: DISCONTINUED | OUTPATIENT
Start: 2020-05-29 | End: 2020-05-30 | Stop reason: HOSPADM

## 2020-05-29 RX ORDER — BISACODYL 10 MG
10 SUPPOSITORY, RECTAL RECTAL DAILY PRN
Status: DISCONTINUED | OUTPATIENT
Start: 2020-05-29 | End: 2020-05-30 | Stop reason: HOSPADM

## 2020-05-29 RX ORDER — NALOXONE HYDROCHLORIDE 0.4 MG/ML
.1-.4 INJECTION, SOLUTION INTRAMUSCULAR; INTRAVENOUS; SUBCUTANEOUS
Status: DISCONTINUED | OUTPATIENT
Start: 2020-05-29 | End: 2020-05-30 | Stop reason: HOSPADM

## 2020-05-29 RX ORDER — MAGNESIUM SULFATE HEPTAHYDRATE 40 MG/ML
2 INJECTION, SOLUTION INTRAVENOUS DAILY PRN
Status: DISCONTINUED | OUTPATIENT
Start: 2020-05-29 | End: 2020-05-30 | Stop reason: HOSPADM

## 2020-05-29 RX ORDER — POTASSIUM CHLORIDE 1500 MG/1
20-40 TABLET, EXTENDED RELEASE ORAL
Status: DISCONTINUED | OUTPATIENT
Start: 2020-05-29 | End: 2020-05-30 | Stop reason: HOSPADM

## 2020-05-29 RX ORDER — OXYCODONE HYDROCHLORIDE 5 MG/1
5-10 TABLET ORAL
Status: DISCONTINUED | OUTPATIENT
Start: 2020-05-29 | End: 2020-05-30 | Stop reason: HOSPADM

## 2020-05-29 RX ORDER — AMOXICILLIN 250 MG
2 CAPSULE ORAL 2 TIMES DAILY PRN
Status: DISCONTINUED | OUTPATIENT
Start: 2020-05-29 | End: 2020-05-30 | Stop reason: HOSPADM

## 2020-05-29 RX ORDER — ALBUTEROL SULFATE 90 UG/1
2 AEROSOL, METERED RESPIRATORY (INHALATION) EVERY 6 HOURS PRN
Status: DISCONTINUED | OUTPATIENT
Start: 2020-05-29 | End: 2020-05-30 | Stop reason: HOSPADM

## 2020-05-29 RX ORDER — POTASSIUM CHLORIDE 29.8 MG/ML
20 INJECTION INTRAVENOUS
Status: DISCONTINUED | OUTPATIENT
Start: 2020-05-29 | End: 2020-05-30 | Stop reason: HOSPADM

## 2020-05-29 RX ORDER — DOXYCYCLINE 100 MG/10ML
100 INJECTION, POWDER, LYOPHILIZED, FOR SOLUTION INTRAVENOUS EVERY 12 HOURS
Status: DISCONTINUED | OUTPATIENT
Start: 2020-05-29 | End: 2020-05-30

## 2020-05-29 RX ORDER — POTASSIUM CL/LIDO/0.9 % NACL 10MEQ/0.1L
10 INTRAVENOUS SOLUTION, PIGGYBACK (ML) INTRAVENOUS
Status: DISCONTINUED | OUTPATIENT
Start: 2020-05-29 | End: 2020-05-30 | Stop reason: HOSPADM

## 2020-05-29 RX ORDER — LIDOCAINE 40 MG/G
CREAM TOPICAL
Status: DISCONTINUED | OUTPATIENT
Start: 2020-05-29 | End: 2020-05-30 | Stop reason: HOSPADM

## 2020-05-29 RX ORDER — ACETAMINOPHEN 325 MG/1
650 TABLET ORAL EVERY 4 HOURS PRN
Status: DISCONTINUED | OUTPATIENT
Start: 2020-05-29 | End: 2020-05-30 | Stop reason: HOSPADM

## 2020-05-29 RX ORDER — ACETAMINOPHEN 650 MG/1
650 SUPPOSITORY RECTAL EVERY 4 HOURS PRN
Status: DISCONTINUED | OUTPATIENT
Start: 2020-05-29 | End: 2020-05-30 | Stop reason: HOSPADM

## 2020-05-29 RX ORDER — PROCHLORPERAZINE 25 MG
25 SUPPOSITORY, RECTAL RECTAL EVERY 12 HOURS PRN
Status: DISCONTINUED | OUTPATIENT
Start: 2020-05-29 | End: 2020-05-30 | Stop reason: HOSPADM

## 2020-05-29 RX ORDER — ONDANSETRON 2 MG/ML
4 INJECTION INTRAMUSCULAR; INTRAVENOUS EVERY 6 HOURS PRN
Status: DISCONTINUED | OUTPATIENT
Start: 2020-05-29 | End: 2020-05-30 | Stop reason: HOSPADM

## 2020-05-29 RX ADMIN — IBUPROFEN 600 MG: 600 TABLET ORAL at 06:29

## 2020-05-29 RX ADMIN — ACETAMINOPHEN 650 MG: 325 TABLET, FILM COATED ORAL at 12:53

## 2020-05-29 RX ADMIN — IBUPROFEN 600 MG: 600 TABLET ORAL at 20:03

## 2020-05-29 RX ADMIN — ENOXAPARIN SODIUM 40 MG: 40 INJECTION SUBCUTANEOUS at 09:20

## 2020-05-29 RX ADMIN — DOXYCYCLINE 100 MG: 100 INJECTION, POWDER, LYOPHILIZED, FOR SOLUTION INTRAVENOUS at 12:46

## 2020-05-29 RX ADMIN — POTASSIUM CHLORIDE 20 MEQ: 1500 TABLET, EXTENDED RELEASE ORAL at 20:03

## 2020-05-29 RX ADMIN — POTASSIUM CHLORIDE 40 MEQ: 1500 TABLET, EXTENDED RELEASE ORAL at 17:53

## 2020-05-29 RX ADMIN — DOXYCYCLINE 100 MG: 100 INJECTION, POWDER, LYOPHILIZED, FOR SOLUTION INTRAVENOUS at 02:17

## 2020-05-29 RX ADMIN — AZITHROMYCIN MONOHYDRATE 500 MG: 500 INJECTION, POWDER, LYOPHILIZED, FOR SOLUTION INTRAVENOUS at 00:02

## 2020-05-29 RX ADMIN — DEXTROSE AND SODIUM CHLORIDE: 5; 900 INJECTION, SOLUTION INTRAVENOUS at 01:03

## 2020-05-29 ASSESSMENT — ACTIVITIES OF DAILY LIVING (ADL)
ADLS_ACUITY_SCORE: 10
RETIRED_COMMUNICATION: 0-->UNDERSTANDS/COMMUNICATES WITHOUT DIFFICULTY
BATHING: 0-->INDEPENDENT
ADLS_ACUITY_SCORE: 12
ADLS_ACUITY_SCORE: 10
COGNITION: 0 - NO COGNITION ISSUES REPORTED
RETIRED_EATING: 0-->INDEPENDENT
ADLS_ACUITY_SCORE: 10
DRESS: 0-->INDEPENDENT
ADLS_ACUITY_SCORE: 10
FALL_HISTORY_WITHIN_LAST_SIX_MONTHS: NO
TRANSFERRING: 0-->INDEPENDENT
TOILETING: 0-->INDEPENDENT
AMBULATION: 0-->INDEPENDENT
SWALLOWING: 0-->SWALLOWS FOODS/LIQUIDS WITHOUT DIFFICULTY

## 2020-05-29 NOTE — PROGRESS NOTES
Called by [facility] provider regarding transfer of Ms. Contreras who is a 55 y/o F with recent dx of COVID19 (5/16) admitted for ongoing fever and cough. Labs with negative procal, no leukocytosis. Patient in RA and hemodynamically stable. She will likely be discharged in next 24 hrs and currently will stay at [facility]. If patient requires ongoing hospitalization, provider will call back and discuss transfer to .

## 2020-05-29 NOTE — PLAN OF CARE
Patient A&Ox4, Independent in the room. C/o midsternal CP and pain with cough. Tolerating clears. Gave Ibuprofen for pain control. Receiving IV antibiotics

## 2020-05-29 NOTE — H&P
[facility]    History and Physical - Hospitalist Service       Date of Admission:  5/28/2020    Assessment & Plan   Charu Lerma is a 54 year old female who developed symptoms of COVID-19 infection on May 16 and had a test performed on May 19.  On May 21 she was told she had a positive test.  She has had ongoing fever, cough and headache since that time.  She is now developed chest pain.  In the emergency department her temperature was 103.  Chest x-ray showed bilateral infiltrates.  Her oxygen saturation is in the low 90% range on room air.  She is being admitted for impending respiratory failure.    Probable bilateral pneumonia due to Covid-19 infection   Impending respiratory failure   Rule out bacterial pneumonia   The patient tells me that she has had fever, cough, sore throat and headache for about 2 weeks now.  She has now developed some chest discomfort when she breathes.  The patient is currently stable.  She is not requiring supplemental oxygen.  She received Tylenol and is feeling a little better.    Admit to isolation     Intravenous D5NS    Continue intravenous antibiotics until pro-calcitonin back and blood cultures negative x 24 hours     Antipyretics and analgesics as needed    Continue oxygen saturation monitoring       Possible history of hypertension  The patient tells me that she has high blood pressure but is not on any medication.  Her blood pressure is elevated here.    Monitor for now    History of primary hyperparathyroidism      Diet: advance diet as tolerated   DVT Prophylaxis: Enoxaparin (Lovenox) SQ and Pneumatic Compression Devices  Mclean Catheter: not present  Code Status: Full          Disposition Plan   Expected discharge: Tomorrow, recommended to [place] once a bed is available .  Entered: Mike Sierra MD 05/28/2020, 10:20 PM     The patient's care was discussed with the Patient.    Mike Sierra MD  [facility]    ______________________________________________________________________    Chief Complaint   Fever, cough     History is obtained from the patient, electronic health record and emergency department physician    History of Present Illness   Charu Lerma is a 54 year old female who possibly has a history of hypertension per the chart notes but is not on any antihypertensive medication.  She had a virtual visit at urgent care on May 19 complaining of 3 days of cough, fever, sweats and headache.  A COVID-19 test was ordered.  On May 21 she was notified of the positive test.  Since that time her symptoms have continued.  Over the past few days she has had worsening chest pain and ongoing high fever.  In the emergency department her blood pressure was 154/86, temperature 103.2  F, heart rate 115, respiratory rate 22 and ox saturation 92% on room air.  Her lungs sounded clear on exam.  Chest x-ray showed bilateral patchy infiltrates.  D-dimer was 0.5, troponin undetectable, lactic acid 0.9, creatinine 0.74, white blood cell count 9.4 thousand and platelet count 351,000.  Venous blood gas showed PCO2 37.  She was given acetaminophen.  She is being admitted due to her oxygen saturation being in the low 90% range.    Review of Systems    The 10 point Review of Systems is negative other than noted in the HPI or here.     Past Medical History    I have reviewed this patient's medical history and updated it with pertinent information if needed.   Past Medical History:   Diagnosis Date     Hypercalcemia 2017     Hyperparathyroidism, primary (H)      Hypovitaminosis D 2016     Low bone density 10/2019       Past Surgical History   I have reviewed this patient's surgical history and updated it with pertinent information if needed.  Past Surgical History:   Procedure Laterality Date     TUBAL LIGATION         Social History   I have reviewed this patient's social history and updated it with pertinent  information if needed.  Social History     Tobacco Use     Smoking status: Never Smoker     Smokeless tobacco: Never Used   Substance Use Topics     Alcohol use: No     Drug use: No       Family History   I have reviewed this patient's family history and updated it with pertinent information if needed.   Family History   Problem Relation Age of Onset     Diabetes Son 25        on insulin     Glaucoma No family hx of      Macular Degeneration No family hx of      Nephrolithiasis No family hx of      Hypercalcemia No family hx of      Thyroid Disease No family hx of        Prior to Admission Medications   Prior to Admission Medications   Prescriptions Last Dose Informant Patient Reported? Taking?   GLUCOSAMINE SULFATE PO   Yes No   VITAMIN D, CHOLECALCIFEROL, PO   Yes No   Sig: Take by mouth daily   crisaborole (EUCRISA) 2 % ointment   No No   Sig: Apply topically 2 times daily   Patient not taking: Reported on 10/2/2019   valACYclovir (VALTREX) 1000 mg tablet   No No   Sig: Take 1 tablet (1,000 mg) by mouth 3 times daily   Patient not taking: Reported on 7/18/2019      Facility-Administered Medications: None     Allergies   No Known Allergies    Physical Exam   Vital Signs: Temp: 103.2  F (39.6  C) Temp src: Oral BP: (!) 144/74 Pulse: 110 Heart Rate: 111 Resp: (!) 41 SpO2: 94 % O2 Device: None (Room air)    Weight: 160 lbs 0 oz  See emergency department physician's note for physical exam.  Visit/Communication Style   PHONE communication was used to talk with Charu due to the COVID pandemic.  I did not personally see this patient.  Charu consented to the use of phone communication: yes  Time spent speaking with the patient: 5-10 minutes.         Data   Data reviewed today: I reviewed all medications, new labs and imaging results over the last 24 hours. I personally reviewed the chest x-ray image(s) showing multifocal infiltrates.    Recent Labs   Lab 05/28/20  2044   WBC 9.4   HGB 12.3   MCV 89      *    POTASSIUM 3.9   CHLORIDE 101   CO2 23   BUN 9   CR 0.74   ANIONGAP 8   KIERA 10.5*   *   ALBUMIN 3.2*   PROTTOTAL 8.2   BILITOTAL 0.4   ALKPHOS 107   ALT 29   AST 23   TROPI <0.015     Recent Results (from the past 24 hour(s))   XR Chest Port 1 View    Narrative    XR PORTABLE CHEST ONE VIEW   5/28/2020 9:20 PM     HISTORY: Dyspnea/shortness of breath.      COMPARISON: None.      Impression    IMPRESSION: Patchy bilateral pulmonary opacities mostly at the lower  lungs worrisome for multifocal airspace disease including pneumonia.  Normal cardiac silhouette.    LUIS A WILLIAM MD

## 2020-05-29 NOTE — PROGRESS NOTES
RECEIVING UNIT ED HANDOFF REVIEW    ED Nurse Handoff Report was reviewed by: Rosalie Roberts RN on May 29, 2020 at 12:23 AM

## 2020-05-29 NOTE — PROGRESS NOTES
[facility]    Medicine Progress Note - Hospitalist Service       Date of Admission:  5/28/2020  Assessment & Plan   Charu Lerma is a 54 year old female who developed symptoms of COVID-19 infection on May 16 and had a test performed on May 19.  On May 21 she was told she had a positive test.  She has had ongoing fever, cough, headache, sore throat since that time. She also notes chest pain that is sharp and worse when she takes deep breaths. She states none of these symptoms have worsened but she is concerned that they have persisted. She has also noted loose stools, currently only one BM per day. Some of her family have had dry cough, but no one has been more ill. All have tested negative so far. No other sick contacts.   In the emergency department her temperature was 103.  Chest x-ray showed bilateral infiltrates.  Her oxygen saturation is in the low 90% range on room air.        Probable bilateral pneumonia due to Covid-19 infection, with persistent symptoms, most notable for chest pain and fever of 103.   Bacterial pneumonia unlikely,   * CXR shows patchy bilateral infiltrates.   * D-dimer negative. CRP elevated.* Fever of 103, BCx negative to this point.     The pt tells me that she has had fever, cough, SOB, sore throat and headache for about 2 weeks now.  She has also developed some sharp chest discomfort when she breathes. She denies any acute worsening in her symptoms. She presented because symptoms were persistent.     - Advance diet, stop IVF  - Stop ceftriaxone, continue doxycycline at this time.   - Benzocaine-methol lonzes + phenol ordered for throat  - Ibuprofen + tylenol have been helpful for HA. She has not required oxycodone.   - If she remains stable tomorrow, BCx negative at 24 hours, will discharge tomorrow, so did not transfer today.     Suspect Dehydration at admission S/p IVF hgb 12.3 > 10.5. Cr 0.74 > 0.55.   Anemia of acute inflammatory illness and  dilution from IVF  - F/u in AM.     Mild hypokalemia (3.3) post IVF  - Give potassium replacement.     Hyperglycemia - on random BMP  - Check A1c, BS BID     Possible history of hypertension  The patient tells me that she has high blood pressure but is not on any medication.  Her blood pressure is elevated here. BP normal.      History of primary hyperparathyroidism  Calcium 10.5 at admission > 9.5, post fluids.       Diet: Regular Diet Adult    DVT Prophylaxis: Enoxaparin (Lovenox) SQ  Mclean Catheter: not present  Code Status: Full Code           Disposition Plan   Expected discharge: Tomorrow, recommended to prior living arrangement once if blood cultures remain negative. Remains stable. .  Entered: Roseann Carroll MD 05/29/2020, 5:20 PM       The patient's care was discussed with the Bedside Nurse and Patient. Also called [facility] and they agreed no transfer indicated if patient discharging within 24 hours.     Roseann Carroll MD  Hospitalist Service  [facility]    ______________________________________________________________________    Interval History   Events over last 24 hours as outlined above. See above regarding history. Patient denies any abdominal pain, N/V.        Data reviewed today: I reviewed all medications, new labs and imaging results over the last 24 hours. I personally reviewed the chest x-ray image(s) showing bilateral infiltrates.    Physical Exam   Vital Signs: Temp: 96.8  F (36  C) Temp src: Oral BP: 113/57 Pulse: 80 Heart Rate: 80 Resp: 18 SpO2: 95 % O2 Device: None (Room air)    Weight: 160 lbs 0 oz  Constitutional: NAD,   Neuropsyche:  alert and oriented, answers questions appropriately.   Respiratory:  Breathing comfortably, good air exchange, no wheezes, no crackles. Dry cough while I am in the kenneth.   Cardiovascular: Regular rate and rhythm, no edema.  GI: soft, NT/ND, BS normal  Skin/Integumen:  No acute rash, bruising or sign of bleeding.       Data   Recent  Labs   Lab 05/29/20  1030 05/28/20  2044   WBC 6.3 9.4   HGB 10.5* 12.3   MCV 90 89    351    132*   POTASSIUM 3.3* 3.9   CHLORIDE 111* 101   CO2 22 23   BUN 7 9   CR 0.55 0.74   ANIONGAP 7 8   KIERA 9.5 10.5*   * 101*   ALBUMIN  --  3.2*   PROTTOTAL  --  8.2   BILITOTAL  --  0.4   ALKPHOS  --  107   ALT  --  29   AST  --  23   TROPI <0.015 <0.015     Recent Results (from the past 24 hour(s))   XR Chest Port 1 View    Narrative    XR PORTABLE CHEST ONE VIEW   5/28/2020 9:20 PM     HISTORY: Dyspnea/shortness of breath.      COMPARISON: None.      Impression    IMPRESSION: Patchy bilateral pulmonary opacities mostly at the lower  lungs worrisome for multifocal airspace disease including pneumonia.  Normal cardiac silhouette.    LUIS A WILLIAM MD     Medications     - MEDICATION INSTRUCTIONS -         doxycycline (VIBRAMYCIN) IV  100 mg Intravenous Q12H     enoxaparin ANTICOAGULANT  40 mg Subcutaneous Q24H     sodium chloride (PF)  3 mL Intracatheter Q8H

## 2020-05-29 NOTE — ED PROVIDER NOTES
"  History   Chief Complaint  Chest pain, fever, and cough    The history is provided by the patient.     Charu Lerma is a 54 year old female with a history of hypercalcemia and hypovitaminosis D who presents for evaluation of Chest pain, fever, and cough. Patient states that she has had worsening chest pains since her positive COVID test two weeks ago.     Allergies  No Known Drug Allergies    Medications  GLUCOSAMINE SULFATE   valacyclovir  VITAMIN D, CHOLECALCIFEROL    Past Medical History  Hypercalcemia   Hyperparathyroidism, primary   Hypovitaminosis D   Low bone density     Past Surgical History  Tubal ligation    Family History  Son - Diabetes on insulin    Social History  Tobacco use: Never Smoker  Alcohol use: No  Drug use: no  Marital Status: Single    Review of Systems   Constitutional: Positive for chills and fever.   Respiratory: Positive for cough and shortness of breath.    Cardiovascular: Positive for chest pain.   Gastrointestinal: Negative.    All other systems reviewed and are negative.      Physical Exam     Patient Vitals for the past 24 hrs:   BP Temp Temp src Pulse Heart Rate Resp SpO2 Height Weight   05/28/20 2230 90/40 -- -- 98 96 26 92 % -- --   05/28/20 2215 -- -- -- -- 95 14 95 % -- --   05/28/20 2200 118/53 -- -- 103 104 26 93 % -- --   05/28/20 2130 (!) 144/74 -- -- 110 111 (!) 41 94 % -- --   05/28/20 2100 (!) 165/97 -- -- -- 110 29 91 % -- --   05/28/20 2034 (!) 154/86 103.2  F (39.6  C) Oral -- 112 22 92 % 1.575 m (5' 2\") 72.6 kg (160 lb)   05/28/20 2030 (!) 154/86 -- -- 115 -- -- 92 % -- --       Physical Exam  GENERAL: well developed, pleasant  HEAD: atraumatic  EYES: pupils reactive, extraocular muscles intact, conjunctivae normal  ENT:  mucus membranes moist  NECK:  trachea midline, normal range of motion  RESPIRATORY: no tachypnea, breath sounds clear to auscultation   CVS: normal S1/S2, no murmurs, intact distal pulses  ABDOMEN: soft, nontender, " nondistention  MUSCULOSKELETAL: no deformities  SKIN: warm and dry, no acute rashes or ulceration  NEURO: GCS 15, cranial nerves intact, alert and oriented x3  PSYCH:  Mood/affect normal    Emergency Department Course     Imaging:  Radiology findings were communicated with the patient who voiced understanding of the findings.    XR Chest Port 1 View  IMPRESSION: Patchy bilateral pulmonary opacities mostly at the lower  lungs worrisome for multifocal airspace disease including pneumonia.  Normal cardiac silhouette.    Readings per Radiology    Laboratory:  Laboratory findings were communicated with the patient who voiced understanding of the findings.    D Dimer: 0.5  Troponin I (Collected at 2044): <0.015  Blood gas venous: pCO2 Venous 37 (low), pO2 Venous 24 (low), o/w WNL.  Lactic acid (Resulted at 2057): 0.9  Magnesium: 2.3  CMP: Glucose 101 (high), Sodium: 132 (low), Calcium: 10.5 (high), Albumin: 3.2 (low), o/w WNL (Creatinine: 0.74)  CBC: WBC: 9.4, HGB: 12.3, PLT: 351    Blood Cultures (x2): pending    Interventions:  2100 Tylenol 975 mg PO  2235 Rocephin 2 g IV  Ordered Zithromax 500 mg IV    Emergency Department Course:  Past medical records, nursing notes, and vitals reviewed.    2035 I physically examined the patient as documented above.    IV was inserted and blood was drawn for laboratory testing, results above.  The patient was sent for a Chest XR while in the emergency department, results above.     2153 I rechecked the patient and discussed the findings of their workup thus far including plans for discharge.    Findings and plan explained to the Patient who consents to admission. Discussed the patient with Dr. Sierra, Hopsitalist, who will admit the patient to an adult Med/Surg bed for further monitoring, evaluation, and treatment.    I personally reviewed the laboratory and imaging results with the Patient and answered all related questions prior to admission.       Impression & Plan   Medical  Decision Making:  Charu Lerma is a 54 year old female cough and COVID positive.  She notes symptoms are getting worse with chest pain and cough.  He was some body aches and headache that Tylenol is not helping.  She is not requiring oxygen but does show bilateral infiltrates on chest x-ray.  She does not feel safe going home.  She was given broad-spectrum antibiotics and pain control.  Spoke with hospitalist regarding admission.    Diagnosis:    ICD-10-CM    1. Pneumonia due to COVID-19 virus  U07.1 Blood culture    J12.89 CRP inflammation     CRP inflammation     Procalcitonin     Procalcitonin     CANCELED: Procalcitonin     CANCELED: CRP inflammation       Disposition:  Admitted to adult med/surg under the care of Dr. Sierra.    Scribe Disclosure:  ANGEL, Eliecer Orona, am serving as a scribe at 8:27 PM on 5/28/2020 to document services personally performed by Emanuel Alexander MD based on my observations and the provider's statements to me.        Emanuel Alexander MD  05/28/20 3117

## 2020-05-29 NOTE — PHARMACY-ADMISSION MEDICATION HISTORY
Pharmacy Medication History  Admission medication history interview status for the 5/28/2020  admission is complete. See EPIC admission navigator for prior to admission medications     Medication history sources: Patient  Medication history source reliability: Moderate  Adherence assessment: Good    Significant changes made to the medication list:  Removed Crisaborole, Valacyclovir      Additional medication history information:   None    Medication reconciliation completed by provider prior to medication history? Yes    Time spent in this activity: 10 min      Prior to Admission medications    Medication Sig Last Dose Taking? Auth Provider   GLUCOSAMINE SULFATE PO Take 2 capsules by mouth At Bedtime  5/27/2020 at hs Yes Reported, Patient   VITAMIN D, CHOLECALCIFEROL, PO Take 1 tablet by mouth At Bedtime  5/27/2020 at hs Yes Reported, Patient

## 2020-05-30 VITALS
BODY MASS INDEX: 29.44 KG/M2 | WEIGHT: 160 LBS | OXYGEN SATURATION: 95 % | HEIGHT: 62 IN | SYSTOLIC BLOOD PRESSURE: 119 MMHG | RESPIRATION RATE: 18 BRPM | DIASTOLIC BLOOD PRESSURE: 55 MMHG | TEMPERATURE: 97 F | HEART RATE: 82 BPM

## 2020-05-30 LAB
ANION GAP SERPL CALCULATED.3IONS-SCNC: 7 MMOL/L (ref 3–14)
BUN SERPL-MCNC: 9 MG/DL (ref 7–30)
CALCIUM SERPL-MCNC: 10.8 MG/DL (ref 8.5–10.1)
CHLORIDE SERPL-SCNC: 105 MMOL/L (ref 94–109)
CO2 SERPL-SCNC: 24 MMOL/L (ref 20–32)
CREAT SERPL-MCNC: 0.65 MG/DL (ref 0.52–1.04)
ERYTHROCYTE [DISTWIDTH] IN BLOOD BY AUTOMATED COUNT: 13.6 % (ref 10–15)
GFR SERPL CREATININE-BSD FRML MDRD: >90 ML/MIN/{1.73_M2}
GLUCOSE BLDC GLUCOMTR-MCNC: 86 MG/DL (ref 70–99)
GLUCOSE SERPL-MCNC: 147 MG/DL (ref 70–99)
HCT VFR BLD AUTO: 37 % (ref 35–47)
HGB BLD-MCNC: 12.2 G/DL (ref 11.7–15.7)
MCH RBC QN AUTO: 29.8 PG (ref 26.5–33)
MCHC RBC AUTO-ENTMCNC: 33 G/DL (ref 31.5–36.5)
MCV RBC AUTO: 91 FL (ref 78–100)
PLATELET # BLD AUTO: 410 10E9/L (ref 150–450)
POTASSIUM SERPL-SCNC: 3.6 MMOL/L (ref 3.4–5.3)
POTASSIUM SERPL-SCNC: 4.5 MMOL/L (ref 3.4–5.3)
RBC # BLD AUTO: 4.09 10E12/L (ref 3.8–5.2)
SODIUM SERPL-SCNC: 136 MMOL/L (ref 133–144)
WBC # BLD AUTO: 7.2 10E9/L (ref 4–11)

## 2020-05-30 PROCEDURE — 25000128 H RX IP 250 OP 636: Performed by: INTERNAL MEDICINE

## 2020-05-30 PROCEDURE — 85027 COMPLETE CBC AUTOMATED: CPT | Performed by: HOSPITALIST

## 2020-05-30 PROCEDURE — 25000132 ZZH RX MED GY IP 250 OP 250 PS 637: Performed by: INTERNAL MEDICINE

## 2020-05-30 PROCEDURE — 00000146 ZZHCL STATISTIC GLUCOSE BY METER IP

## 2020-05-30 PROCEDURE — 25000125 ZZHC RX 250: Performed by: HOSPITALIST

## 2020-05-30 PROCEDURE — 80048 BASIC METABOLIC PNL TOTAL CA: CPT | Performed by: HOSPITALIST

## 2020-05-30 PROCEDURE — 93005 ELECTROCARDIOGRAM TRACING: CPT

## 2020-05-30 PROCEDURE — 99239 HOSP IP/OBS DSCHRG MGMT >30: CPT | Performed by: INTERNAL MEDICINE

## 2020-05-30 PROCEDURE — 25000132 ZZH RX MED GY IP 250 OP 250 PS 637: Performed by: HOSPITALIST

## 2020-05-30 RX ORDER — AZITHROMYCIN 250 MG/1
250 TABLET, FILM COATED ORAL DAILY
Status: DISCONTINUED | OUTPATIENT
Start: 2020-05-30 | End: 2020-05-30 | Stop reason: HOSPADM

## 2020-05-30 RX ORDER — ACETAMINOPHEN 325 MG/1
650 TABLET ORAL EVERY 4 HOURS PRN
Qty: 1 BOTTLE | Refills: 0 | Status: SHIPPED | OUTPATIENT
Start: 2020-05-30 | End: 2020-05-30

## 2020-05-30 RX ORDER — AZITHROMYCIN 250 MG/1
250 TABLET, FILM COATED ORAL DAILY
Qty: 3 TABLET | Refills: 0 | Status: SHIPPED | OUTPATIENT
Start: 2020-05-31 | End: 2020-05-30

## 2020-05-30 RX ORDER — AZITHROMYCIN 250 MG/1
250 TABLET, FILM COATED ORAL DAILY
Qty: 3 TABLET | Refills: 0 | Status: SHIPPED | OUTPATIENT
Start: 2020-05-31 | End: 2020-06-09

## 2020-05-30 RX ORDER — ACETAMINOPHEN 325 MG/1
650 TABLET ORAL EVERY 4 HOURS PRN
Qty: 1 BOTTLE | Refills: 0 | Status: SHIPPED | OUTPATIENT
Start: 2020-05-30 | End: 2021-12-20

## 2020-05-30 RX ADMIN — Medication 1 LOZENGE: at 08:23

## 2020-05-30 RX ADMIN — ACETAMINOPHEN 650 MG: 325 TABLET, FILM COATED ORAL at 08:23

## 2020-05-30 RX ADMIN — DOXYCYCLINE 100 MG: 100 INJECTION, POWDER, LYOPHILIZED, FOR SOLUTION INTRAVENOUS at 00:53

## 2020-05-30 RX ADMIN — AZITHROMYCIN MONOHYDRATE 250 MG: 250 TABLET ORAL at 11:01

## 2020-05-30 RX ADMIN — ENOXAPARIN SODIUM 40 MG: 40 INJECTION SUBCUTANEOUS at 08:23

## 2020-05-30 RX ADMIN — IBUPROFEN 600 MG: 600 TABLET ORAL at 06:16

## 2020-05-30 ASSESSMENT — ACTIVITIES OF DAILY LIVING (ADL)
ADLS_ACUITY_SCORE: 10

## 2020-05-30 NOTE — PROGRESS NOTES
A/OX4. VSS on RA. K+ 3.3, replaced, recheck at midnight and in the AM. Pain managed with Tylenol and Ibuprofen. Doroteo regular diet well. Likely to discharge home tomorrow.

## 2020-05-30 NOTE — PLAN OF CARE
Pt discharging home at this time. Pt will drive herself home as she has her car at the parking lot. AVS, meds and education given. More education regarding COVID-19 given, pt verbalizing understanding. Pt discharging home with all her belongings. Questions answered.

## 2020-05-30 NOTE — DISCHARGE SUMMARY
[facility]  Hospitalist Discharge Summary      Date of Admission:  5/28/2020  Date of Discharge:  5/30/2020  Discharging Provider: Roseann Carroll MD      Discharge Diagnoses   COVID19  Possible atypical pneumonia, less likely.     Follow-ups Needed After Discharge   Follow-up Appointments     Follow-up and recommended labs and tests       Follow up with primary care provider, Chio Tovar, within 7 days   for hospital follow- up.  The following labs/tests are recommended: BMP,   albumin. CXR in 4-6 weeks.             Unresulted Labs Ordered in the Past 30 Days of this Admission     Date and Time Order Name Status Description    5/28/2020 2037 Blood culture Preliminary       Hospitalist group will be notified if cultures turn positive. No growth to date.       Discharge Disposition   Discharged to home  Condition at discharge: Good      Hospital Course   Charu Lerma is a 54 year old female who developed symptoms of COVID-19 infection on May 16 and had a test performed on May 19.  On May 21 she was told she had a positive test.  She has had ongoing fever, cough, headache, sore throat since that time. She also notes chest pain that is sharp and worse when she takes deep breaths. She states none of these symptoms have worsened but she is concerned that they have persisted. She has also noted loose stools, currently only one BM per day. Some of her family have had dry cough, but no one has been more ill. All have tested negative so far. No other sick contacts.   In the emergency department her temperature was 103.  Chest x-ray showed bilateral infiltrates.  Her oxygen saturation is in the low 90% range on room air.      She had fever, cough, SOB, sore throat and headache for about 2 weeks now. She has also developed some sharp chest discomfort over the last week when she breathes. She denies any acute worsening in her symptoms. She presented because symptoms were persistent.      Probable bilateral pneumonia due to Covid-19 infection, with persistent symptoms, most notable for chest pain and fever of 103.   Bacterial pneumonia unlikely,   * CXR shows patchy bilateral infiltrates.   * D-dimer negative. CRP elevated at 126.   * BCx NGTD  * Pro calcitonin low, 0.05.   * Serial troponins and EKG NSR with R axis deviation, otherwise unremarkable. Nl QT interval.     - Patient remained very stable throughout her hospitalization with improvement in cough, sore throat and headache. She had no further fevers and oxygenation remained normal on RA. No significant SOB.   - She was given one dose of ceftriaxone, then stopped when procalcitonin came back low. She received azithromycin in ER. This was changed to doxycycline by admitting doctor and this was changed back to azithromycin to complete 5 day course at discharge.   - Tylenol effective for HA and therefore given Rx at discharge.   - She lives with family who will be present to monitor for any worsening but doing quite well.     Mild hypokalemia (3.3) post IVF  - Give potassium replacement.     Pre-diabetes glucose 175 on random BMP. A1c 6.3 consistent with pre-diabetes.  - F/u A1c with PCP.   Recent Labs   Lab 05/30/20  0835 05/30/20  0618 05/29/20  1929 05/29/20  1030 05/28/20  2044   *  --   --  175* 101*   BGM  --  86 120*  --   --        Possible h/o hypertension  The patient tells me that she has high blood pressure but is not on any medication.  Her blood pressure is elevated here. BP normal.      History of primary hyperparathyroidism  Calcium 10.5 at admission > 9.5, post fluids.> 10.8 at discharge.   - Follow up BMP, albumin with provider.       Consultations This Hospital Stay   None    Code Status   Full Code    Time Spent on this Encounter   I, Roseann Carroll MD, personally saw the patient today and spent less than or greater than  30 minutes discharging this patient.       Roseann Carroll MD  [facility]  Hospital  ______________________________________________________________________    Physical Exam   Vital Signs: Temp: 97  F (36.1  C) Temp src: Oral BP: 119/55 Pulse: 82 Heart Rate: 85 Resp: 18 SpO2: 95 % O2 Device: None (Room air)    Weight: 160 lbs 0 oz  Constitutional:  NAD,   Neuropsyche:  alert and oriented, answers questions appropriately.   Respiratory:  Breathing comfortably, good air exchange, no wheezes, no crackles.   Cardiovascular:  Regular rate and rhythm, no edema.  GI:  soft, NT/ND, BS normal  Skin/Integumen:  No acute rash, bruising or sign of bleeding.          Primary Care Physician   Chio Tovar    Discharge Orders      Reason for your hospital stay    You were admitted with persistent symptoms of COVID19. Your evaluation did not indicate severe disease and there was no evidence of severe bacterial infection. We did start a short course of antibiotics for possible bacterial pneumonia or bronchitis.     Follow-up and recommended labs and tests     Follow up with primary care provider, Chio Tovar, within 7 days for hospital follow- up.  The following labs/tests are recommended: CXR in 4-6 weeks.     Activity    Your activity upon discharge: activity as tolerated     Diet    Regular Diet Adult       Significant Results and Procedures   Most Recent 3 CBC's:  Recent Labs   Lab Test 05/30/20  0835 05/29/20  1030 05/28/20  2044   WBC 7.2 6.3 9.4   HGB 12.2 10.5* 12.3   MCV 91 90 89    311 351     Most Recent 3 BMP's:  Recent Labs   Lab Test 05/30/20  0835 05/29/20  2355 05/29/20  1030 05/28/20  2044     --  140 132*   POTASSIUM 3.6 4.5 3.3* 3.9   CHLORIDE 105  --  111* 101   CO2 24  --  22 23   BUN 9  --  7 9   CR 0.65  --  0.55 0.74   ANIONGAP 7  --  7 8   KIERA 10.8*  --  9.5 10.5*   *  --  175* 101*     Most Recent 2 LFT's:  Recent Labs   Lab Test 05/28/20 2044 07/02/19  1018   AST 23 18   ALT 29 31   ALKPHOS 107 160*   BILITOTAL 0.4 0.4     Most Recent 3  Troponin's:  Recent Labs   Lab Test 05/29/20  1030 05/28/20 2044   TROPI <0.015 <0.015     Most Recent 3 BNP's:  Recent Labs   Lab Test 05/29/20  1030   NTBNPI 96     Most Recent D-dimer:  Recent Labs   Lab Test 05/28/20 2044   DD 0.5     Most Recent 6 Bacteria Isolates From Any Culture (See EPIC Reports for Culture Details):  Recent Labs   Lab Test 05/28/20  2045 02/10/17  2046 09/27/16  0955 02/15/15  2124 02/15/15  2123   CULT No growth after 2 days >100,000 colonies/mL Escherichia coli  >100,000 colonies/mL Strain 2 Proteus mirabilis  * 10,000 to 50,000 colonies/mL mixed urogenital chacha Susceptibility testing not   routinely done   >100,000 colonies/mL Escherichia coli* No Beta Streptococcus isolated     Most Recent Hemoglobin A1c:  Recent Labs   Lab Test 05/29/20  1030   A1C 6.3*     Most Recent ESR & CRP:  Recent Labs   Lab Test 05/28/20 2044 11/24/17  1821   SED  --  25   .0* <2.9   ,   Results for orders placed or performed during the hospital encounter of 05/28/20   XR Chest Port 1 View    Narrative    XR PORTABLE CHEST ONE VIEW   5/28/2020 9:20 PM     HISTORY: Dyspnea/shortness of breath.      COMPARISON: None.      Impression    IMPRESSION: Patchy bilateral pulmonary opacities mostly at the lower  lungs worrisome for multifocal airspace disease including pneumonia.  Normal cardiac silhouette.    LUIS A WILLIAM MD       Discharge Medications   Current Discharge Medication List      START taking these medications    Details   acetaminophen (TYLENOL) 325 MG tablet Take 2 tablets (650 mg) by mouth every 4 hours as needed for mild pain  Qty: 1 Bottle, Refills: 0    Associated Diagnoses: Acute respiratory disease due to COVID-19 virus      azithromycin (ZITHROMAX) 250 MG tablet Take 1 tablet (250 mg) by mouth daily for 3 days  Qty: 3 tablet, Refills: 0    Associated Diagnoses: Acute respiratory disease due to COVID-19 virus         CONTINUE these medications which have NOT CHANGED    Details    GLUCOSAMINE SULFATE PO Take 2 capsules by mouth At Bedtime       VITAMIN D, CHOLECALCIFEROL, PO Take 1 tablet by mouth At Bedtime            Allergies   No Known Allergies

## 2020-05-30 NOTE — PLAN OF CARE
COVID POSITIVE. VSS. A/Ox4. Ind, clear liquid diet. Last BG was 86. Has headache, pain controlled with Ibuprofen. Denies chest pain, SOB, but has infrequent cough. EKG showed NSR. Possible discharge today.

## 2020-06-01 ENCOUNTER — TELEPHONE (OUTPATIENT)
Dept: FAMILY MEDICINE | Facility: CLINIC | Age: 55
End: 2020-06-01

## 2020-06-01 DIAGNOSIS — U07.1 COVID-19: Primary | ICD-10-CM

## 2020-06-01 NOTE — TELEPHONE ENCOUNTER
ED / Discharge Outreach Protocol    Patient Contact    Attempt # 1    Was call answered?  No.  Left message on voicemail with information to call me back.    Kinjal Velazco RN

## 2020-06-02 ENCOUNTER — PATIENT OUTREACH (OUTPATIENT)
Dept: CARE COORDINATION | Facility: CLINIC | Age: 55
End: 2020-06-02

## 2020-06-03 ENCOUNTER — TELEPHONE (OUTPATIENT)
Dept: ENDOCRINOLOGY | Facility: CLINIC | Age: 55
End: 2020-06-03

## 2020-06-03 LAB
BACTERIA SPEC CULT: NO GROWTH
SPECIMEN SOURCE: NORMAL

## 2020-06-03 NOTE — TELEPHONE ENCOUNTER
ED / Discharge Outreach Protocol    Patient Contact    Attempt # 2    Was call answered?  No.  Left message on voicemail with information to call me back.    Kinjal Velazco RN

## 2020-06-03 NOTE — TELEPHONE ENCOUNTER
----- Message from Kanika Phan sent at 5/1/2020 12:15 PM CDT -----  Regarding: Post-covid US  Qing US order

## 2020-06-03 NOTE — TELEPHONE ENCOUNTER
LVM for pt to c/b to schedule ultrasound, post-covid, as ordered by Dr Longo on 4/28. Gave direct scheduling # to imaging.

## 2020-06-05 LAB — INTERPRETATION ECG - MUSE: NORMAL

## 2020-06-09 ENCOUNTER — VIRTUAL VISIT (OUTPATIENT)
Dept: FAMILY MEDICINE | Facility: CLINIC | Age: 55
End: 2020-06-09

## 2020-06-09 ENCOUNTER — NURSE TRIAGE (OUTPATIENT)
Dept: NURSING | Facility: CLINIC | Age: 55
End: 2020-06-09

## 2020-06-09 DIAGNOSIS — U07.1 PNEUMONIA DUE TO COVID-19 VIRUS: Primary | ICD-10-CM

## 2020-06-09 DIAGNOSIS — B37.31 CANDIDIASIS OF VAGINA: ICD-10-CM

## 2020-06-09 DIAGNOSIS — J12.82 PNEUMONIA DUE TO COVID-19 VIRUS: Primary | ICD-10-CM

## 2020-06-09 PROCEDURE — 99214 OFFICE O/P EST MOD 30 MIN: CPT | Mod: 95 | Performed by: INTERNAL MEDICINE

## 2020-06-09 RX ORDER — FLUCONAZOLE 150 MG/1
150 TABLET ORAL EVERY OTHER DAY
Qty: 3 TABLET | Refills: 0 | Status: SHIPPED | OUTPATIENT
Start: 2020-06-09 | End: 2020-09-23

## 2020-06-09 RX ORDER — FLUCONAZOLE 150 MG/1
150 TABLET ORAL EVERY OTHER DAY
Qty: 3 TABLET | Refills: 0 | Status: SHIPPED | OUTPATIENT
Start: 2020-06-09 | End: 2020-06-09

## 2020-06-09 ASSESSMENT — ANXIETY QUESTIONNAIRES
5. BEING SO RESTLESS THAT IT IS HARD TO SIT STILL: NOT AT ALL
2. NOT BEING ABLE TO STOP OR CONTROL WORRYING: NOT AT ALL
6. BECOMING EASILY ANNOYED OR IRRITABLE: NOT AT ALL
4. TROUBLE RELAXING: SEVERAL DAYS
7. FEELING AFRAID AS IF SOMETHING AWFUL MIGHT HAPPEN: NOT AT ALL
3. WORRYING TOO MUCH ABOUT DIFFERENT THINGS: NOT AT ALL
GAD7 TOTAL SCORE: 1
IF YOU CHECKED OFF ANY PROBLEMS ON THIS QUESTIONNAIRE, HOW DIFFICULT HAVE THESE PROBLEMS MADE IT FOR YOU TO DO YOUR WORK, TAKE CARE OF THINGS AT HOME, OR GET ALONG WITH OTHER PEOPLE: NOT DIFFICULT AT ALL
1. FEELING NERVOUS, ANXIOUS, OR ON EDGE: NOT AT ALL

## 2020-06-09 NOTE — PROGRESS NOTES
"Charu Lerma is a 54 year old female who is being evaluated via a billable telephone visit.      The patient has been notified of following:     \"This telephone visit will be conducted via a call between you and your physician/provider. We have found that certain health care needs can be provided without the need for a physical exam.  This service lets us provide the care you need with a short phone conversation.  If a prescription is necessary we can send it directly to your pharmacy.  If lab work is needed we can place an order for that and you can then stop by our lab to have the test done at a later time.    Telephone visits are billed at different rates depending on your insurance coverage. During this emergency period, for some insurers they may be billed the same as an in-person visit.  Please reach out to your insurance provider with any questions.    If during the course of the call the physician/provider feels a telephone visit is not appropriate, you will not be charged for this service.\"    Patient has given verbal consent for Telephone visit?  Yes    What phone number would you like to be contacted at? [phone]    How would you like to obtain your AVS? Skip Silva     Charu Lerma is a 54 year old female who presents via phone visit today for the following health issues:    [facility]    Hospital Follow-up Visit:    Hospital/Nursing Home/IP Rehab Facility: [facility]  Date of Admission: 05/28/2020  Date of Discharge: 05/30/2020  Reason(s) for Admission: respiratory issues      Was your hospitalization related to COVID-19? YES   How are you feeling today? Much better  In the past 24 hours have you had shortness of breath when speaking, walking, or climbing stairs? My breathing issues have improved  Do you have a cough? Yes, I have a cough but it's not worse  When is the last time you had a fever greater than 100? none  Are you having any other " symptoms? Diarrhea and Fatigue   Do you have any other stressors you would like to discuss with your provider? No    PHQ Assesment Total Score(s) 6/9/2020   PHQ-2 Score 0   Some recent data might be hidden       MAYTE-7 Results 6/9/2020   MAYTE-7 Total Score 1   Some recent data might be hidden       PTSD Screen Score 6/9/2020   Have you ever experienced this kind of event? No   Some recent data might be hidden       Was the patient in the ICU or did the patient experience delirium during hospitalization?  No          Problems taking medications regularly:  None  Medication changes since discharge: None  Problems adhering to non-medication therapy:  None    Summary of hospitalization:  [facility] discharge summary reviewed  Diagnostic Tests/Treatments reviewed.  Follow up needed: COVID-19  Other Healthcare Providers Involved in Patient s Care:           Update since discharge: improved.    Post Discharge Medication Reconciliation: discharge medications reconciled and changed, per note/orders (see AVS).  Plan of care communicated with patient            Patient has been feeling better since her discharge from the hospital.  Shortness of breath has resolved and cough has become infrequent.  No fever/chills or malaise.    She has developed vaginal itching but has not noticed any vaginal discharge.  Denies vaginal/pelvic pain or bleeding.      Review of Systems   Constitutional: Negative for fatigue.   Eyes: Negative for visual disturbance.   Respiratory: Negative for shortness of breath.    Cardiovascular: Negative for chest pain, palpitations and leg swelling.   Gastrointestinal: Negative for abdominal pain, nausea and vomiting.   Neurological: Negative for dizziness, weakness, light-headedness, numbness and headaches.         ICD-10-CM    1. Pneumonia due to COVID-19 virus  U07.1 Symptomatic COVID-19 Virus (Coronavirus) by PCR    J12.89    2. Candidiasis of vagina  B37.3 fluconazole (DIFLUCAN) 150 MG tablet        Total time spent with the patient over the phone was 16 minutes.      Dr. Kt Jhaveri

## 2020-06-10 DIAGNOSIS — U07.1 PNEUMONIA DUE TO COVID-19 VIRUS: ICD-10-CM

## 2020-06-10 DIAGNOSIS — J12.82 PNEUMONIA DUE TO COVID-19 VIRUS: ICD-10-CM

## 2020-06-10 PROCEDURE — 99207 ZZC NO BILLABLE SERVICE THIS VISIT: CPT

## 2020-06-10 PROCEDURE — 99000 SPECIMEN HANDLING OFFICE-LAB: CPT | Performed by: INTERNAL MEDICINE

## 2020-06-10 PROCEDURE — U0003 INFECTIOUS AGENT DETECTION BY NUCLEIC ACID (DNA OR RNA); SEVERE ACUTE RESPIRATORY SYNDROME CORONAVIRUS 2 (SARS-COV-2) (CORONAVIRUS DISEASE [COVID-19]), AMPLIFIED PROBE TECHNIQUE, MAKING USE OF HIGH THROUGHPUT TECHNOLOGIES AS DESCRIBED BY CMS-2020-01-R: HCPCS | Mod: 90 | Performed by: INTERNAL MEDICINE

## 2020-06-10 ASSESSMENT — ENCOUNTER SYMPTOMS
NAUSEA: 0
SHORTNESS OF BREATH: 0
HEADACHES: 0
WEAKNESS: 0
VOMITING: 0
FATIGUE: 0
DIZZINESS: 0
LIGHT-HEADEDNESS: 0
NUMBNESS: 0
PALPITATIONS: 0
ABDOMINAL PAIN: 0

## 2020-06-10 ASSESSMENT — ANXIETY QUESTIONNAIRES: GAD7 TOTAL SCORE: 1

## 2020-06-10 NOTE — LETTER
June 13, 2020        Charu Ramirezitez  [address]  [place] [address]    This letter provides a written record that you were tested for COVID-19 on 6/10/20.   Your result was negative.    This means that we didn t find the virus that causes COVID-19 in your sample. A test may show negative when you do actually have the virus. This can happen when the virus is in the early stages of infection, before you feel illness symptoms.    Even if you don t have symptoms, they may still appear. For safety, it s very important to follow these rules.    Keep yourself away from others (self-isolation):      Stay home. Don t go to work, school or anywhere else.     Stay in your own room (and use your own bathroom), if you can.    Stay away from others in your home. No hugging, kissing or shaking hands. No visitors.    Clean  high touch  surfaces often (doorknobs, counters, handles, etc.). Use a household cleaning spray or wipes.    Cover your mouth and nose with a mask, tissue or washcloth to avoid spreading germs.    Wash your hands and face often with soap and water.    Stay in self-isolation until you meet ALL of the guidelines below:    1. You have had no fever for at least 72 hours (that is 3 full days of no fever without the use of medicine that reduces fevers), AND  2. other symptoms (such as cough, shortness of breath) have gotten better, AND  3. at least 10 days have passed since your symptoms first appeared.    Going back to work  Check with your employer for any guidelines to follow for going back to work.    Employers: This document serves as formal notice that your employee tested negative for COVID-19, as of the testing date shown above.    For questions regarding this letter or your Negative COVID-19 result, call [phone] between 8A to 6:30P (M-F) and 10A to 6:30P (weekends).

## 2020-06-11 LAB
SARS-COV-2 RNA SPEC QL NAA+PROBE: NOT DETECTED
SPECIMEN SOURCE: NORMAL

## 2020-08-05 ENCOUNTER — TELEPHONE (OUTPATIENT)
Dept: ENDOCRINOLOGY | Facility: CLINIC | Age: 55
End: 2020-08-05

## 2020-08-05 NOTE — TELEPHONE ENCOUNTER
----- Message from Edwige MADRIGAL Link sent at 6/3/2020 10:11 AM CDT -----  Regarding: FW: Post-covid US  LVM on 6/3 to set up ultrasound at her convenience. See if pt has scheduled/completed ultrasound yet. If not, call again.  ----- Message -----  From: Kanika Phan  Sent: 6/1/2020  To: [id number]  Subject: Post-covid US                                    Qing DOMINGO order

## 2020-08-09 ENCOUNTER — NURSE TRIAGE (OUTPATIENT)
Dept: NURSING | Facility: CLINIC | Age: 55
End: 2020-08-09

## 2020-08-10 ENCOUNTER — OFFICE VISIT (OUTPATIENT)
Dept: INTERNAL MEDICINE | Facility: CLINIC | Age: 55
End: 2020-08-10

## 2020-08-10 VITALS
TEMPERATURE: 98.6 F | DIASTOLIC BLOOD PRESSURE: 80 MMHG | HEART RATE: 82 BPM | OXYGEN SATURATION: 99 % | RESPIRATION RATE: 16 BRPM | WEIGHT: 160.2 LBS | SYSTOLIC BLOOD PRESSURE: 130 MMHG | BODY MASS INDEX: 29.3 KG/M2

## 2020-08-10 DIAGNOSIS — H11.32 CONJUNCTIVAL HEMORRHAGE OF LEFT EYE: Primary | ICD-10-CM

## 2020-08-10 LAB
ERYTHROCYTE [DISTWIDTH] IN BLOOD BY AUTOMATED COUNT: 13.9 % (ref 10–15)
HCT VFR BLD AUTO: 42.7 % (ref 35–47)
HGB BLD-MCNC: 13.9 G/DL (ref 11.7–15.7)
INR PPP: 0.91 (ref 0.86–1.14)
MCH RBC QN AUTO: 30.3 PG (ref 26.5–33)
MCHC RBC AUTO-ENTMCNC: 32.6 G/DL (ref 31.5–36.5)
MCV RBC AUTO: 93 FL (ref 78–100)
PLATELET # BLD AUTO: 304 10E9/L (ref 150–450)
RBC # BLD AUTO: 4.59 10E12/L (ref 3.8–5.2)
WBC # BLD AUTO: 7.9 10E9/L (ref 4–11)

## 2020-08-10 PROCEDURE — 36415 COLL VENOUS BLD VENIPUNCTURE: CPT | Performed by: INTERNAL MEDICINE

## 2020-08-10 PROCEDURE — 85610 PROTHROMBIN TIME: CPT | Performed by: INTERNAL MEDICINE

## 2020-08-10 PROCEDURE — 85027 COMPLETE CBC AUTOMATED: CPT | Performed by: INTERNAL MEDICINE

## 2020-08-10 PROCEDURE — 99213 OFFICE O/P EST LOW 20 MIN: CPT | Performed by: INTERNAL MEDICINE

## 2020-08-10 NOTE — TELEPHONE ENCOUNTER
Charu calls in to see if she should keep appointment as she probably does not have pink eye but she has red eyes and some eyepain. Told her to keep appointment to make sure she does not have something more going on.  She agrees to keep appointment.     Additional Information    Eyelid is red and painful (or tender to touch)    Protocols used: EYE - RED WITHOUT PUS-A-AH

## 2020-08-10 NOTE — PROGRESS NOTES
Subjective     Charu Lerma is a 55 year old female who presents to clinic today for the following health issues:    HPI       Eye(s) Problem      Duration: x3 days    Description:  Location: left  Pain: no   Redness: YES  Discharge: no     Accompanying signs and symptoms: none    History (Trauma, foreign body exposure,): None    Precipitating or alleviating factors (contact use): None    Therapies tried and outcome: eye drops not working    Has noticed profound redness on half of her left eye.  Visual acuity not affected, no significant pain.  She is not on any blood thinning medication, denies any history of trauma.        Reviewed and updated as needed this visit by Provider         Review of Systems   Constitutional, HEENT, cardiovascular, pulmonary, GI, , musculoskeletal, neuro, skin, endocrine and psych systems are negative, except as otherwise noted.      Objective    /80 (BP Location: Left arm, Patient Position: Chair, Cuff Size: Adult Regular)   Pulse 82   Temp 98.6  F (37  C) (Oral)   Resp 16   Wt 72.7 kg (160 lb 3.2 oz)   SpO2 99%   BMI 29.30 kg/m    Body mass index is 29.3 kg/m .  Physical Exam   GENERAL: healthy, alert and no distress  EYES: Conjunctival hemorrhage, medial aspect of left eye.  Anterior chamber is completely normal.  NECK: no adenopathy, no asymmetry, masses, or scars and thyroid normal to palpation  RESP: lungs clear to auscultation - no rales, rhonchi or wheezes  CV: regular rate and rhythm, normal S1 S2, no S3 or S4, no murmur, click or rub, no peripheral edema and peripheral pulses strong  ABDOMEN: soft, nontender, no hepatosplenomegaly, no masses and bowel sounds normal  MS: no gross musculoskeletal defects noted, no edema            Assessment & Plan     1. Conjunctival hemorrhage of left eye  Mostly offered reassurance -this did not require any specific therapy at this point, but will likely take 2 to 3 weeks to resolve completely.  Check coags  "today.  - CBC with platelets  - INR     BMI:   Estimated body mass index is 29.3 kg/m  as calculated from the following:    Height as of 5/28/20: 1.575 m (5' 2\").    Weight as of this encounter: 72.7 kg (160 lb 3.2 oz).           See Patient Instructions    No follow-ups on file.    Lukas Ashton MD  [facility]    "

## 2020-08-20 ENCOUNTER — HOSPITAL ENCOUNTER (OUTPATIENT)
Dept: ULTRASOUND IMAGING | Facility: CLINIC | Age: 55
Discharge: HOME | End: 2020-08-20

## 2020-08-20 DIAGNOSIS — E21.0 PRIMARY HYPERPARATHYROIDISM (H): ICD-10-CM

## 2020-08-20 PROCEDURE — 76536 US EXAM OF HEAD AND NECK: CPT

## 2020-08-21 ENCOUNTER — TELEPHONE (OUTPATIENT)
Dept: ENDOCRINOLOGY | Facility: CLINIC | Age: 55
End: 2020-08-21

## 2020-08-21 NOTE — TELEPHONE ENCOUNTER
Please call radiology at [facility] (I am not sure which location that is) and tell them that I am not satisfied with the quality of the 8/20/2020 parathyroid US.  I would like them to prove to me that the study is negative with cine clips.  Can they call her back and do cine clips of the regions where parathyroid glands are located?  Lauren Longo MD

## 2020-08-21 NOTE — TELEPHONE ENCOUNTER
Johnie said they discussed that with our department a year or so ago.  They don't do cine clips so are unable to do more images. If more detailed imaging is needed  [facility] will not be contacting the patient . [facility] ? Elizabeth Friedman RN on 8/21/2020 at 1:56 PM

## 2020-08-21 NOTE — TELEPHONE ENCOUNTER
I spoke with [facility] and they do not do the cine clips there. They tell me this is done at the [facility] . A cine clips study was ordered or they would ave not done the U/S there . Se would need to be rescheduled at the [facility] for this. Elizabeth Friedman, RN on 8/21/2020 at 1:00 PM

## 2020-08-26 ENCOUNTER — NURSE TRIAGE (OUTPATIENT)
Dept: NURSING | Facility: CLINIC | Age: 55
End: 2020-08-26

## 2020-08-26 NOTE — TELEPHONE ENCOUNTER
Triage call:   Symptoms for one month  Losing hair   - is itchy in her throat and nose  Has an issue with her thyroid  - she is requesting medication for her thyroid and labs to be drawn    She has she states that she had an US last week-  She does have a specialist- per chart review- ENDOCRINOLOGY for a new diagnosis of HYPERPARATHYROIDISM    She states that she is looking for advice of what to do about her symptoms and to go on medication- advised that she should also contact her endocrinologist as well since they are treating her. She also requested an note be sent to her PCP as well for additional care advice.     PCP please review and advise if approrpiate.     Gianna Byrd RN BSN 8/26/2020 12:07 PM        Reason for Disposition    [1] Caller requesting NON-URGENT health information AND [2] PCP's office is the best resource    Additional Information    Negative: [1] Caller is not with the adult (patient) AND [2] reporting urgent symptoms    Negative: Lab result questions    Negative: Medication questions    Negative: Caller can't be reached by phone    Negative: Caller has already spoken to PCP or another triager    Negative: RN needs further essential information from caller in order to complete triage    Negative: Requesting regular office appointment    Protocols used: INFORMATION ONLY CALL-A-

## 2020-08-26 NOTE — TELEPHONE ENCOUNTER
(There is a encounter of endocrinology from 8/21 and they are working on this issue of imaging of parathyroid)    To Dr Tovar,    Please see [facility] note below. I expect pt should be just dealing with her endocrinologist for this issue.     Correct?    Tatiana Santoyo, RN, BSN

## 2020-09-03 ENCOUNTER — HOSPITAL ENCOUNTER (OUTPATIENT)
Dept: MAMMOGRAPHY | Facility: CLINIC | Age: 55
Discharge: HOME | End: 2020-09-03
Attending: FAMILY MEDICINE | Admitting: FAMILY MEDICINE
Payer: COMMERCIAL

## 2020-09-03 ENCOUNTER — TELEPHONE (OUTPATIENT)
Dept: FAMILY MEDICINE | Facility: CLINIC | Age: 55
End: 2020-09-03

## 2020-09-03 DIAGNOSIS — E55.9 VITAMIN D DEFICIENCY: ICD-10-CM

## 2020-09-03 DIAGNOSIS — Z12.31 VISIT FOR SCREENING MAMMOGRAM: ICD-10-CM

## 2020-09-03 DIAGNOSIS — L65.9 HAIR LOSS: Primary | ICD-10-CM

## 2020-09-03 PROCEDURE — 77067 SCR MAMMO BI INCL CAD: CPT

## 2020-09-03 NOTE — TELEPHONE ENCOUNTER
Reason for Call: Request for an order or referral:    Order or referral being requested: orders    Date needed: 9-3-2020    Has the patient been seen by the PCP for this problem? YES    Additional comments: patient had appointment at 10:00 and would like to get order put in for her to be able to get this done today. Sounds like labs is what she is requesting patient had a strong accent so was hard to understand her as to what she wanted done I think she said TSH and T4 and something to do with losing her hair I think. Please contact her to get correct information    Phone number Patient can be reached at:  Home number on file [phone] (home)    Best Time:  asap    Can we leave a detailed message on this number?  YES    Call taken on 9/3/2020 at 9:23 AM by Ame Oates

## 2020-09-03 NOTE — TELEPHONE ENCOUNTER
Patient is asking for TSH and T4  Has sleep issues and is losing her hair  Has been more tired lately and thinks she may need to be on levothyroxine.  States has seen endocrinology, but they did not order thyroid labs.  I suggested patient may need to be seen or have a virtual visit but she would like labs first.  Patient also mentions that her Vitamin D always comes back low and she has been taking Vitamin D for quite a while.  Please advise.  Reene Whitley RN

## 2020-09-04 NOTE — TELEPHONE ENCOUNTER
Left message on machine to call back.  Ask to speak to any triage nurse, let them know it's a return call.    Leave a number and time that you can be reached.   Renee Whitley RN

## 2020-09-06 DIAGNOSIS — L65.9 HAIR LOSS: ICD-10-CM

## 2020-09-06 DIAGNOSIS — E55.9 VITAMIN D DEFICIENCY: ICD-10-CM

## 2020-09-06 LAB — TSH SERPL DL<=0.005 MIU/L-ACNC: 3.35 MU/L (ref 0.4–4)

## 2020-09-06 PROCEDURE — 36415 COLL VENOUS BLD VENIPUNCTURE: CPT | Performed by: FAMILY MEDICINE

## 2020-09-06 PROCEDURE — 84443 ASSAY THYROID STIM HORMONE: CPT | Performed by: FAMILY MEDICINE

## 2020-09-06 PROCEDURE — 82306 VITAMIN D 25 HYDROXY: CPT | Performed by: FAMILY MEDICINE

## 2020-09-08 LAB — DEPRECATED CALCIDIOL+CALCIFEROL SERPL-MC: 34 UG/L (ref 20–75)

## 2020-09-22 NOTE — PROGRESS NOTES
SUBJECTIVE:                                                   Charu Lerma is a 55 year old female who presents to clinic today for the following health issue(s):  Patient presents with:  Establish Care: previous cares was at [facility] in [place]      Additional information: pt states when she has labs done she would like a detailed explanation of her results and the whys she stated her last clinic didn't give her detailed information of her labs. Pt also spoke about loss of hair and her concerns    HPI:  Patient scheduled appt here to establish primary care.    She has no gyn concerns.   I explained we do gyn care but not primary care.   She has had issues with parathyroid, hyperlipidemia, depression treatment.   I will cancel visit with us today and have her make appt with [facility] [place] in our building  She wants detailed management of medical issues that are not gynecologic.   I gave her their address and phone number.   We will no charge visit here today    No LMP recorded. Patient is postmenopausal..     Patient is not sexually active, .  Using menopause for contraception.    reports that she has never smoked. She has never used smokeless tobacco.    STD testing offered?  Declined    Health maintenance updated:  no    Today's PHQ-2 Score:   PHQ-2 (  [employer]) 2020   Q1: Little interest or pleasure in doing things 0   Q2: Feeling down, depressed or hopeless 0   PHQ-2 Score 0   Q1: Little interest or pleasure in doing things -   Q2: Feeling down, depressed or hopeless -   PHQ-2 Score -     Today's PHQ-9 Score:   PHQ-9 SCORE 2019   PHQ-9 Total Score MyChart -   PHQ-9 Total Score 11     Today's MAYTE-7 Score:   MAYTE-7 SCORE 2020   Total Score 1       Problem list and histories reviewed & adjusted, as indicated.  Additional history: as documented.    Patient Active Problem List   Diagnosis     Hyperlipidemia, unspecified hyperlipidemia type     Obesity,  "unspecified obesity severity, unspecified obesity type     Vitamin D deficiency     Mild major depression (H)     Hypercalcemia     Primary hyperparathyroidism (H)     Low bone density     Acute respiratory disease due to COVID-19 virus     Past Surgical History:   Procedure Laterality Date     TUBAL LIGATION        Social History     Tobacco Use     Smoking status: Never Smoker     Smokeless tobacco: Never Used   Substance Use Topics     Alcohol use: No      Problem (# of Occurrences) Relation (Name,Age of Onset)    Diabetes (1) Son (25): on insulin       Negative family history of: Glaucoma, Macular Degeneration, Nephrolithiasis, Hypercalcemia, Thyroid Disease            Current Outpatient Medications   Medication Sig     acetaminophen (TYLENOL) 325 MG tablet Take 2 tablets (650 mg) by mouth every 4 hours as needed for mild pain     GLUCOSAMINE SULFATE PO Take 2 capsules by mouth At Bedtime      VITAMIN D, CHOLECALCIFEROL, PO Take 1 tablet by mouth At Bedtime      No current facility-administered medications for this visit.      No Known Allergies          OBJECTIVE:     Ht 1.61 m (5' 3.39\")   Wt 75.3 kg (166 lb)   BMI 29.05 kg/m    Body mass index is 29.05 kg/m .        ASSESSMENT/PLAN:                                                      Exam not done  Gave patient address and phone number for [facility] since she didn't want to complete her visit today once she found out we are not a primary care clinic        Nannette Causey MD  [facility] [place]  "

## 2020-09-23 ENCOUNTER — OFFICE VISIT (OUTPATIENT)
Dept: OBGYN | Facility: CLINIC | Age: 55
End: 2020-09-23
Payer: COMMERCIAL

## 2020-09-23 VITALS — WEIGHT: 166 LBS | HEIGHT: 63 IN | BODY MASS INDEX: 29.41 KG/M2

## 2020-09-23 DIAGNOSIS — Z71.1 PHYSICALLY WELL BUT WORRIED: Primary | ICD-10-CM

## 2020-09-23 PROCEDURE — 99207 ZZC NO BILLABLE SERVICE THIS VISIT: CPT | Performed by: OBSTETRICS & GYNECOLOGY

## 2020-09-23 ASSESSMENT — MIFFLIN-ST. JEOR: SCORE: 1313.22

## 2020-12-14 ENCOUNTER — HEALTH MAINTENANCE LETTER (OUTPATIENT)
Age: 55
End: 2020-12-14

## 2021-04-17 ENCOUNTER — HEALTH MAINTENANCE LETTER (OUTPATIENT)
Age: 56
End: 2021-04-17

## 2021-07-06 ENCOUNTER — TELEPHONE (OUTPATIENT)
Dept: FAMILY MEDICINE | Facility: CLINIC | Age: 56
End: 2021-07-06

## 2021-07-06 NOTE — TELEPHONE ENCOUNTER
PT called to ask about being seen for back pain.  Back pain on left side. mild pain.pain for 7 days.    Would also like to get referral for eye exam.  Made appt for 7/7/21.    Discussed options for UC vs clinic.  FELICIA Wheeler

## 2021-08-17 ENCOUNTER — OFFICE VISIT (OUTPATIENT)
Dept: DERMATOLOGY | Facility: CLINIC | Age: 56
End: 2021-08-17
Payer: COMMERCIAL

## 2021-08-17 VITALS — OXYGEN SATURATION: 98 % | DIASTOLIC BLOOD PRESSURE: 79 MMHG | SYSTOLIC BLOOD PRESSURE: 133 MMHG | HEART RATE: 68 BPM

## 2021-08-17 DIAGNOSIS — Z51.81 THERAPEUTIC DRUG MONITORING: ICD-10-CM

## 2021-08-17 DIAGNOSIS — B35.1 ONYCHOMYCOSIS: Primary | ICD-10-CM

## 2021-08-17 DIAGNOSIS — L30.9 DERMATITIS: ICD-10-CM

## 2021-08-17 DIAGNOSIS — D48.5 NEOPLASM OF UNCERTAIN BEHAVIOR OF SKIN: ICD-10-CM

## 2021-08-17 PROCEDURE — 36415 COLL VENOUS BLD VENIPUNCTURE: CPT | Performed by: PHYSICIAN ASSISTANT

## 2021-08-17 PROCEDURE — 99214 OFFICE O/P EST MOD 30 MIN: CPT | Mod: 25 | Performed by: PHYSICIAN ASSISTANT

## 2021-08-17 PROCEDURE — 88305 TISSUE EXAM BY PATHOLOGIST: CPT | Performed by: DERMATOLOGY

## 2021-08-17 PROCEDURE — 80076 HEPATIC FUNCTION PANEL: CPT | Performed by: PHYSICIAN ASSISTANT

## 2021-08-17 PROCEDURE — 11102 TANGNTL BX SKIN SINGLE LES: CPT | Performed by: PHYSICIAN ASSISTANT

## 2021-08-17 PROCEDURE — 88312 SPECIAL STAINS GROUP 1: CPT | Performed by: DERMATOLOGY

## 2021-08-17 RX ORDER — BETAMETHASONE DIPROPIONATE 0.5 MG/G
CREAM TOPICAL
Qty: 50 G | Refills: 2 | Status: SHIPPED | OUTPATIENT
Start: 2021-08-17 | End: 2022-04-27

## 2021-08-17 RX ORDER — TERBINAFINE HYDROCHLORIDE 250 MG/1
TABLET ORAL
Qty: 42 TABLET | Refills: 1 | Status: SHIPPED | OUTPATIENT
Start: 2021-08-17 | End: 2021-09-30

## 2021-08-17 NOTE — PROGRESS NOTES
HPI:   Chief complaints: Charu Lerma is a pleasant 56 year old female who presents for evaluation of a persistent rash on the right lower leg. The rash has been present consistently for 4 years. It is itchy. She has used topical creams which helped a little but never cleared the rash. She is applying coconut oil to the area daily.       PHYSICAL EXAM:    /79   Pulse 68   SpO2 98%   Skin exam performed as follows: Type 2 skin. Mood appropriate  Alert and Oriented X 3. Well developed, well nourished in no distress.  General appearance: Normal  Head including face: Normal  Eyes: conjunctiva and lids: Normal  Mouth: Lips, teeth, gums: Normal  Neck: Normal  Chest-breast/axillae: Normal  Back: Normal  Spleen and liver: Normal  Cardiovascular: Exam of peripheral vascular system by observation for swelling, varicosities, edema: Normal  Genitalia: groin, buttocks: Normal  Extremities: digits/nails (clubbing): Normal  Eccrine and Apocrine glands: Normal  Right upper extremity: Normal  Left upper extremity: Normal  Right lower extremity: Normal  Left lower extremity: Normal  Skin: Scalp and body hair: See below    1. Dermatitis on the right lower leg    ASSESSMENT/PLAN:     1. Spong derm vs lichen planus vs CTCL vs other on the right lower leg. Shave biopsy performed.  Area cleaned and anesthetized with 1% lidocaine with epinephrine.  Dermablade used to remove the lesion and sent to pathology. Bleeding was cauterized. Pt tolerated procedure well with no complications.   --Start betamethasone cream BID x 2-3 weeks then PRN  --THick emollients daily        Follow-up: PRN  CC:   Scribed By: Jena Curtis, MS, PA-C

## 2021-08-17 NOTE — PATIENT INSTRUCTIONS
Apply betamethasone cream twice per day for the next 2-3 weeks    Apply moisturizers 1-2 times daily - Cetaphil cream, CeraVe cream, Vanicream, Eucerin cream    Wound Care Instructions     FOR SUPERFICIAL WOUNDS     [facility] [phone]                 AFTER 24 HOURS YOU SHOULD REMOVE THE BANDAGE AND BEGIN DAILY DRESSING CHANGES AS FOLLOWS:     1) Remove Dressing.     2) Clean and dry the area with tap water using a Q-tip or sterile gauze pad.     3) Apply Vaseline, Aquaphor, Polysporin ointment or Bacitracin ointment over entire wound.  Do NOT use Neosporin ointment.     4) Cover the wound with a band-aid, or a sterile non-stick gauze pad and micropore paper tape    REPEAT THESE INSTRUCTIONS AT LEAST ONCE A DAY UNTIL THE WOUND HAS COMPLETELY HEALED.    It is an old wives tale that a wound heals better when it is exposed to air and allowed to dry out. The wound will heal faster with a better cosmetic result if it is kept moist with ointment and covered with a bandage.    **Do not let the wound dry out.**    Supplies Needed:      *Cotton tipped applicators (Q-tips)    *Vaseline, Aquaphor, Polysporin or Bacitracin Ointment (NOT NEOSPORIN)    *Band-aids or non-stick gauze pads and micropore paper tape.      PATIENT INFORMATION:    During the healing process you will notice a number of changes. All wounds develop a small halo of redness surrounding the wound.  This means healing is occurring. Severe itching with extensive redness usually indicates sensitivity to the ointment or bandage tape used to dress the wound.  You should call our office if this develops.      Swelling  and/or discoloration around your surgical site is common, particularly when performed around the eye.    All wounds normally drain.  The larger the wound the more drainage there will be.  After 7-10 days, you will notice the wound beginning to shrink and new skin will begin to grow.  The wound is healed when you can see skin has  formed over the entire area.  A healed wound has a healthy, shiny look to the surface and is red to dark pink in color to normalize.  Wounds may take approximately 4-6 weeks to heal.  Larger wounds may take 6-8 weeks.  After the wound is healed you may discontinue dressing changes.    You may experience a sensation of tightness as your wound heals. This is normal and will gradually subside.    Your healed wound may be sensitive to temperature changes. This sensitivity improves with time, but if you re having a lot of discomfort, try to avoid temperature extremes.    Patients frequently experience itching after their wound appears to have healed because of the continue healing under the skin.  Plain Vaseline will help relieve the itching.      POSSIBLE COMPLICATIONS    BLEEDIN. Leave the bandage in place.  2. Use tightly rolled up gauze or a cloth to apply direct pressure over the bandage for 30  minutes.  3. Reapply pressure for an additional 30 minutes if necessary  4. Use additional gauze and tape to maintain pressure once the bleeding has stopped.

## 2021-08-17 NOTE — LETTER
8/17/2021         RE: Charu Lerma  [address]  [facility] [address]        Dear Colleague,    Thank you for referring your patient, Charu Lerma, to the [facility]. Please see a copy of my visit note below.    HPI:   Chief complaints: Charu Lerma is a pleasant 56 year old female who presents for evaluation of a persistent rash on the right lower leg. The rash has been present consistently for 4 years. It is itchy. She has used topical creams which helped a little but never cleared the rash. She is applying coconut oil to the area daily.       PHYSICAL EXAM:    /79   Pulse 68   SpO2 98%   Skin exam performed as follows: Type 2 skin. Mood appropriate  Alert and Oriented X 3. Well developed, well nourished in no distress.  General appearance: Normal  Head including face: Normal  Eyes: conjunctiva and lids: Normal  Mouth: Lips, teeth, gums: Normal  Neck: Normal  Chest-breast/axillae: Normal  Back: Normal  Spleen and liver: Normal  Cardiovascular: Exam of peripheral vascular system by observation for swelling, varicosities, edema: Normal  Genitalia: groin, buttocks: Normal  Extremities: digits/nails (clubbing): Normal  Eccrine and Apocrine glands: Normal  Right upper extremity: Normal  Left upper extremity: Normal  Right lower extremity: Normal  Left lower extremity: Normal  Skin: Scalp and body hair: See below    1. Dermatitis on the right lower leg    ASSESSMENT/PLAN:     1. Spong derm vs lichen planus vs CTCL vs other on the right lower leg. Shave biopsy performed.  Area cleaned and anesthetized with 1% lidocaine with epinephrine.  Dermablade used to remove the lesion and sent to pathology. Bleeding was cauterized. Pt tolerated procedure well with no complications.   --Start betamethasone cream BID x 2-3 weeks then PRN  --THick emollients daily        Follow-up: PRN  CC:   Scribed By: Jena Curtis MS,  CHAPIS          Again, thank you for allowing me to participate in the care of your patient.        Sincerely,        Jena Horvath PA-C

## 2021-08-18 LAB
ALBUMIN SERPL-MCNC: 4.6 G/DL (ref 3.4–5)
ALP SERPL-CCNC: 140 U/L (ref 40–150)
ALT SERPL W P-5'-P-CCNC: 32 U/L (ref 0–50)
AST SERPL W P-5'-P-CCNC: 20 U/L (ref 0–45)
BILIRUB DIRECT SERPL-MCNC: 0.1 MG/DL (ref 0–0.2)
BILIRUB SERPL-MCNC: 0.3 MG/DL (ref 0.2–1.3)
PROT SERPL-MCNC: 8.6 G/DL (ref 6.8–8.8)

## 2021-08-25 LAB
PATH REPORT.COMMENTS IMP SPEC: NORMAL
PATH REPORT.FINAL DX SPEC: NORMAL
PATH REPORT.GROSS SPEC: NORMAL
PATH REPORT.MICROSCOPIC SPEC OTHER STN: NORMAL
PATH REPORT.RELEVANT HX SPEC: NORMAL

## 2021-08-26 ENCOUNTER — TELEPHONE (OUTPATIENT)
Dept: DERMATOLOGY | Facility: CLINIC | Age: 56
End: 2021-08-26

## 2021-08-26 NOTE — TELEPHONE ENCOUNTER
----- Message from Jena Curtis PA-C sent at 8/25/2021  5:00 PM CDT -----  Her pathology favored psoriasis. For this we recommend topical steroids like the one I gave her. Let me know if she is not doing well.

## 2021-08-27 NOTE — TELEPHONE ENCOUNTER
Called and spoke to patient.    Informed patient pathology favored psoriasis.     Recommended tx for this is topical steroids as prescribed during o/v.    Patient reports she is doing very well using topical betamethasone cream- no longer itchy.    Advised patient to use cream as prescribed- once things resolve, PRN.    Patient voiced understanding.    FELICIA Pablo-BSN-N  [facility]  [phone]

## 2021-09-21 ENCOUNTER — LAB (OUTPATIENT)
Dept: LAB | Facility: CLINIC | Age: 56
End: 2021-09-21
Payer: COMMERCIAL

## 2021-09-21 DIAGNOSIS — Z51.81 THERAPEUTIC DRUG MONITORING: ICD-10-CM

## 2021-09-21 PROCEDURE — 80076 HEPATIC FUNCTION PANEL: CPT

## 2021-09-21 PROCEDURE — 36415 COLL VENOUS BLD VENIPUNCTURE: CPT

## 2021-09-22 LAB
ALBUMIN SERPL-MCNC: 4.2 G/DL (ref 3.4–5)
ALP SERPL-CCNC: 158 U/L (ref 40–150)
ALT SERPL W P-5'-P-CCNC: 25 U/L (ref 0–50)
AST SERPL W P-5'-P-CCNC: 13 U/L (ref 0–45)
BILIRUB DIRECT SERPL-MCNC: <0.1 MG/DL (ref 0–0.2)
BILIRUB SERPL-MCNC: 0.2 MG/DL (ref 0.2–1.3)
PROT SERPL-MCNC: 8.6 G/DL (ref 6.8–8.8)

## 2021-09-30 ENCOUNTER — OFFICE VISIT (OUTPATIENT)
Dept: FAMILY MEDICINE | Facility: CLINIC | Age: 56
End: 2021-09-30
Payer: COMMERCIAL

## 2021-09-30 VITALS
RESPIRATION RATE: 16 BRPM | OXYGEN SATURATION: 96 % | WEIGHT: 154.2 LBS | TEMPERATURE: 97.6 F | BODY MASS INDEX: 27.32 KG/M2 | HEART RATE: 71 BPM | SYSTOLIC BLOOD PRESSURE: 124 MMHG | DIASTOLIC BLOOD PRESSURE: 76 MMHG | HEIGHT: 63 IN

## 2021-09-30 DIAGNOSIS — E21.0 PRIMARY HYPERPARATHYROIDISM (H): ICD-10-CM

## 2021-09-30 DIAGNOSIS — B35.1 ONYCHOMYCOSIS: ICD-10-CM

## 2021-09-30 DIAGNOSIS — Z00.00 ANNUAL PHYSICAL EXAM: Primary | ICD-10-CM

## 2021-09-30 DIAGNOSIS — N39.3 STRESS INCONTINENCE OF URINE: ICD-10-CM

## 2021-09-30 LAB
ERYTHROCYTE [DISTWIDTH] IN BLOOD BY AUTOMATED COUNT: 13.9 % (ref 10–15)
HBA1C MFR BLD: 5.7 % (ref 0–5.6)
HCT VFR BLD AUTO: 40.1 % (ref 35–47)
HGB BLD-MCNC: 13.6 G/DL (ref 11.7–15.7)
MCH RBC QN AUTO: 30.4 PG (ref 26.5–33)
MCHC RBC AUTO-ENTMCNC: 33.9 G/DL (ref 31.5–36.5)
MCV RBC AUTO: 90 FL (ref 78–100)
PLATELET # BLD AUTO: 280 10E3/UL (ref 150–450)
RBC # BLD AUTO: 4.48 10E6/UL (ref 3.8–5.2)
WBC # BLD AUTO: 7.8 10E3/UL (ref 4–11)

## 2021-09-30 PROCEDURE — 99213 OFFICE O/P EST LOW 20 MIN: CPT | Mod: 25 | Performed by: INTERNAL MEDICINE

## 2021-09-30 PROCEDURE — 83036 HEMOGLOBIN GLYCOSYLATED A1C: CPT | Performed by: INTERNAL MEDICINE

## 2021-09-30 PROCEDURE — 99396 PREV VISIT EST AGE 40-64: CPT | Performed by: INTERNAL MEDICINE

## 2021-09-30 PROCEDURE — 80053 COMPREHEN METABOLIC PANEL: CPT | Performed by: INTERNAL MEDICINE

## 2021-09-30 PROCEDURE — 80061 LIPID PANEL: CPT | Performed by: INTERNAL MEDICINE

## 2021-09-30 PROCEDURE — 85027 COMPLETE CBC AUTOMATED: CPT | Performed by: INTERNAL MEDICINE

## 2021-09-30 PROCEDURE — 36415 COLL VENOUS BLD VENIPUNCTURE: CPT | Performed by: INTERNAL MEDICINE

## 2021-09-30 RX ORDER — TERBINAFINE HYDROCHLORIDE 250 MG/1
TABLET ORAL
Qty: 60 TABLET | Refills: 1 | Status: SHIPPED | OUTPATIENT
Start: 2021-09-30 | End: 2021-12-02

## 2021-09-30 ASSESSMENT — ENCOUNTER SYMPTOMS
CHILLS: 0
FREQUENCY: 0
HEMATURIA: 0
DYSURIA: 0
ARTHRALGIAS: 0
DIZZINESS: 0
HEMATOCHEZIA: 0
MYALGIAS: 0
NERVOUS/ANXIOUS: 0
FEVER: 0
NAUSEA: 0
WEAKNESS: 0
JOINT SWELLING: 0
CONSTIPATION: 0
COUGH: 0
DIARRHEA: 0
SORE THROAT: 0
SHORTNESS OF BREATH: 0
EYE PAIN: 0
HEADACHES: 0
PARESTHESIAS: 0
PALPITATIONS: 0
ABDOMINAL PAIN: 0
HEARTBURN: 0

## 2021-09-30 ASSESSMENT — PATIENT HEALTH QUESTIONNAIRE - PHQ9
10. IF YOU CHECKED OFF ANY PROBLEMS, HOW DIFFICULT HAVE THESE PROBLEMS MADE IT FOR YOU TO DO YOUR WORK, TAKE CARE OF THINGS AT HOME, OR GET ALONG WITH OTHER PEOPLE: SOMEWHAT DIFFICULT
SUM OF ALL RESPONSES TO PHQ QUESTIONS 1-9: 4
SUM OF ALL RESPONSES TO PHQ QUESTIONS 1-9: 4

## 2021-09-30 ASSESSMENT — MIFFLIN-ST. JEOR: SCORE: 1248.58

## 2021-09-30 NOTE — PROGRESS NOTES
SUBJECTIVE:   CC: Charu Lerma is an 56 year old woman who presents for preventive health visit.     Miss Box is a 56-year-old lady who presented to the clinic for annual physical and to establish care.  She has a past medical history of hyperparathyroidism and was told she has bone thinning but she did not follow-up.  She has left-sided chest pain that is sharp in intensity and gets better when she straightens her back or presses on her chest.  This is not related to exertion.  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  She is interested in doing exercise daily and changing her diet.  She want referral for UroGyn for urinary symptoms.  Her symptoms include leakage when coughing sneezing or running.  She has tried Kegel's exercises but they have not helped.  She has onychomycosis and is taking terbinafine orally.  She is due for a mammogram, Pap smear and flu shot    Patient has been advised of split billing requirements and indicates understanding: Yes  Healthy Habits:     Getting at least 3 servings of Calcium per day:  Yes    Bi-annual eye exam:  NO    Dental care twice a year:  NO    Sleep apnea or symptoms of sleep apnea:  None    Diet:  Regular (no restrictions)    Frequency of exercise:  1 day/week    Duration of exercise:  Less than 15 minutes    PHQ-2 Total Score: 3    Additional concerns today:  Yes    Today's PHQ-2 Score:   PHQ-2 ( 1999 [employer]) 9/30/2021   Q1: Little interest or pleasure in doing things 0   Q2: Feeling down, depressed or hopeless 1   PHQ-2 Score 1   Q1: Little interest or pleasure in doing things More than half the days   Q2: Feeling down, depressed or hopeless Several days   PHQ-2 Score 3       Abuse: Current or Past (Physical, Sexual or Emotional) - No  Do you feel safe in your environment? Yes    Have you ever done Advance Care Planning? (For example, a Health  Directive, POLST, or a discussion with a medical provider or your loved ones about your wishes): No, advance care planning information given to patient to review.  Patient plans to discuss their wishes with loved ones or provider.      Social History     Tobacco Use     Smoking status: Never Smoker     Smokeless tobacco: Never Used   Substance Use Topics     Alcohol use: No     If you drink alcohol do you typically have >3 drinks per day or >7 drinks per week? No    Alcohol Use 11/16/2017   Prescreen: >3 drinks/day or >7 drinks/week? The patient does not drink >3 drinks per day nor >7 drinks per week.       Reviewed orders with patient.  Reviewed health maintenance and updated orders accordingly - Yes  Lab work is in process    Breast Cancer Screening:  Any new diagnosis of family breast, ovarian, or bowel cancer? No    FHS-7: No flowsheet data found.  click delete button to remove this line now  Mammogram Screening: Recommended mammography every 1-2 years with patient discussion and risk factor consideration  Pertinent mammograms are reviewed under the imaging tab.    History of abnormal Pap smear: NO - age 30-65 PAP every 5 years with negative HPV co-testing recommended  PAP / HPV Latest Ref Rng & Units 9/27/2016   PAP (Historical) - NIL   HPV16 NEG Negative   HPV18 NEG Negative   HRHPV NEG Negative     Reviewed and updated as needed this visit by clinical staff  Tobacco  Allergies             Reviewed and updated as needed this visit by Provider                Review of Systems   Constitutional: Negative for chills and fever.   HENT: Negative for congestion, ear pain, hearing loss and sore throat.    Eyes: Negative for pain and visual disturbance.   Respiratory: Negative for cough and shortness of breath.    Cardiovascular: Negative for chest pain, palpitations and peripheral edema.   Gastrointestinal: Negative for abdominal pain, constipation, diarrhea, heartburn, hematochezia and nausea.   Genitourinary:  Negative for dysuria, frequency, genital sores, hematuria and urgency.   Musculoskeletal: Negative for arthralgias, joint swelling and myalgias.   Skin: Negative for rash.   Neurological: Negative for dizziness, weakness, headaches and paresthesias.   Psychiatric/Behavioral: Negative for mood changes. The patient is not nervous/anxious.    Answers for HPI/ROS submitted by the patient on 9/30/2021  If you checked off any problems, how difficult have these problems made it for you to do your work, take care of things at home, or get along with other people?: Somewhat difficult  PHQ9 TOTAL SCORE: 4         OBJECTIVE:   There were no vitals taken for this visit.  Physical Exam  Vitals reviewed.   Constitutional:       Appearance: Normal appearance.   HENT:      Right Ear: Tympanic membrane normal. There is no impacted cerumen.      Left Ear: Tympanic membrane normal. There is no impacted cerumen.      Mouth/Throat:      Mouth: Mucous membranes are moist.      Pharynx: Oropharynx is clear. No oropharyngeal exudate or posterior oropharyngeal erythema.   Cardiovascular:      Rate and Rhythm: Normal rate.      Heart sounds: Normal heart sounds. No murmur heard.   No gallop.    Pulmonary:      Effort: Pulmonary effort is normal. No respiratory distress.      Breath sounds: Normal breath sounds. No wheezing or rales.   Abdominal:      General: There is no distension.      Palpations: Abdomen is soft.      Tenderness: There is no abdominal tenderness. There is no guarding.   Musculoskeletal:         General: Normal range of motion.      Cervical back: Normal range of motion. No rigidity.      Right lower leg: No edema.      Left lower leg: No edema.   Lymphadenopathy:      Cervical: No cervical adenopathy.   Skin:     General: Skin is warm and dry.   Neurological:      Mental Status: She is alert.   Psychiatric:         Mood and Affect: Mood normal.         Behavior: Behavior normal.           ASSESSMENT/PLAN:       ICD-10-CM   "  1. Annual physical exam  Z00.00 CBC with Platelets (Today)     COMPREHENSIVE METABOLIC PANEL     Lipid Profile     Hemoglobin A1c     *MA Screening Digital Bilateral     CBC with Platelets (Today)     COMPREHENSIVE METABOLIC PANEL     Lipid Profile     Hemoglobin A1c   2. Primary hyperparathyroidism (H)  E21.0 Adult Endocrinology Referral   3. Stress incontinence of urine  N39.3 Adult Urology Referral   4. Onychomycosis  B35.1 terbinafine (LAMISIL) 250 MG tablet       Annual physical exam  Lab work ordered.  Patient declined flu shot.  Mammogram ordered.  Will do Pap in follow-up visit.   -     CBC with Platelets (Today); Future  -     COMPREHENSIVE METABOLIC PANEL; Future  -     Lipid Profile; Future  -     Hemoglobin A1c; Future  -     *MA Screening Digital Bilateral; Future    Primary hyperparathyroidism (H)  She has a history of hyperparathyroidism, did not follow-up in the past, will refer her to endocrine.  -     Adult Endocrinology Referral; Future    Stress incontinence of urine  -     Adult Urology Referral; Future    Onychomycosis  -     terbinafine (LAMISIL) 250 MG tablet; 1 tab po daily        Patient has been advised of split billing requirements and indicates understanding: Yes  COUNSELING:  Reviewed preventive health counseling, as reflected in patient instructions       Regular exercise    Estimated body mass index is 29.05 kg/m  as calculated from the following:    Height as of 9/23/20: 1.61 m (5' 3.39\").    Weight as of 9/23/20: 75.3 kg (166 lb).    Weight management plan: Discussed healthy diet and exercise guidelines    She reports that she has never smoked. She has never used smokeless tobacco.      Counseling Resources:  ATP IV Guidelines  Pooled Cohorts Equation Calculator  Breast Cancer Risk Calculator  BRCA-Related Cancer Risk Assessment: FHS-7 Tool  FRAX Risk Assessment  ICSI Preventive Guidelines  Dietary Guidelines for Americans, 2010  USDA's MyPlate  ASA Prophylaxis  Lung CA " Screening    VANNESSA MOON MD  [facility]

## 2021-09-30 NOTE — PATIENT INSTRUCTIONS
You were seen in [facility] today for an annual physical (preventive) visit. I ordered blood work which includes blood count, electrolytes, kidney function, thyroid function, lipid profile and some screening tests. 8-12 hours of fasting is required for checking lipids. You will get a mychart message or a call about test results.     You are due for mammogram.  Please call the following number to make appointment :  [phone]  It is located in [address]

## 2021-10-01 LAB
ALBUMIN SERPL-MCNC: 4 G/DL (ref 3.4–5)
ALP SERPL-CCNC: 132 U/L (ref 40–150)
ALT SERPL W P-5'-P-CCNC: 24 U/L (ref 0–50)
ANION GAP SERPL CALCULATED.3IONS-SCNC: 3 MMOL/L (ref 3–14)
AST SERPL W P-5'-P-CCNC: 18 U/L (ref 0–45)
BILIRUB SERPL-MCNC: 0.4 MG/DL (ref 0.2–1.3)
BUN SERPL-MCNC: 14 MG/DL (ref 7–30)
CALCIUM SERPL-MCNC: 11.3 MG/DL (ref 8.5–10.1)
CHLORIDE BLD-SCNC: 106 MMOL/L (ref 94–109)
CHOLEST SERPL-MCNC: 251 MG/DL
CO2 SERPL-SCNC: 28 MMOL/L (ref 20–32)
CREAT SERPL-MCNC: 0.68 MG/DL (ref 0.52–1.04)
FASTING STATUS PATIENT QL REPORTED: NO
GFR SERPL CREATININE-BSD FRML MDRD: >90 ML/MIN/1.73M2
GLUCOSE BLD-MCNC: 91 MG/DL (ref 70–99)
HDLC SERPL-MCNC: 47 MG/DL
LDLC SERPL CALC-MCNC: 169 MG/DL
NONHDLC SERPL-MCNC: 204 MG/DL
POTASSIUM BLD-SCNC: 4.4 MMOL/L (ref 3.4–5.3)
PROT SERPL-MCNC: 8.3 G/DL (ref 6.8–8.8)
SODIUM SERPL-SCNC: 137 MMOL/L (ref 133–144)
TRIGL SERPL-MCNC: 175 MG/DL

## 2021-10-01 ASSESSMENT — PATIENT HEALTH QUESTIONNAIRE - PHQ9: SUM OF ALL RESPONSES TO PHQ QUESTIONS 1-9: 4

## 2021-10-02 ENCOUNTER — HEALTH MAINTENANCE LETTER (OUTPATIENT)
Age: 56
End: 2021-10-02

## 2021-10-06 ENCOUNTER — TELEPHONE (OUTPATIENT)
Dept: FAMILY MEDICINE | Facility: CLINIC | Age: 56
End: 2021-10-06

## 2021-10-06 ENCOUNTER — VIRTUAL VISIT (OUTPATIENT)
Dept: UROLOGY | Facility: CLINIC | Age: 56
End: 2021-10-06
Attending: INTERNAL MEDICINE
Payer: COMMERCIAL

## 2021-10-06 DIAGNOSIS — E21.0 PRIMARY HYPERPARATHYROIDISM (H): ICD-10-CM

## 2021-10-06 DIAGNOSIS — N39.3 STRESS INCONTINENCE OF URINE: ICD-10-CM

## 2021-10-06 DIAGNOSIS — N39.41 URGE INCONTINENCE OF URINE: Primary | ICD-10-CM

## 2021-10-06 DIAGNOSIS — E78.5 HYPERLIPIDEMIA, UNSPECIFIED HYPERLIPIDEMIA TYPE: Primary | ICD-10-CM

## 2021-10-06 PROCEDURE — 99202 OFFICE O/P NEW SF 15 MIN: CPT | Mod: 95 | Performed by: OBSTETRICS & GYNECOLOGY

## 2021-10-06 RX ORDER — ATORVASTATIN CALCIUM 10 MG/1
10 TABLET, FILM COATED ORAL DAILY
Qty: 60 TABLET | Refills: 3 | Status: SHIPPED | OUTPATIENT
Start: 2021-10-06 | End: 2021-12-20

## 2021-10-06 NOTE — PROGRESS NOTES
Charu is a 56 year old who is being evaluated via a billable video visit.      How would you like to obtain your AVS? MyChart  If the video visit is dropped, the invitation should be resent by: Text to cell phone: [phone]   Will anyone else be joining your video visit? No      Video Start Time: 4:18 PM  Video-Visit Details    Type of service:  Video Visit    October 6, 2021    Referring Provider: Anitha Terry MD  [address] [address]    Primary Care Provider: Chio Tovar    CC: mixed urinary incontinence    HPI:  Charu Lerma is a 56 year old female who presents for evaluation of her pelvic floor symptoms.  She has urgency and urge incontinence. Also with GA. S>U      Prolapse:  Do you feel a vaginal bulge? no                                      Pressure? no   Do you have to place your fingers in the vagina or in the rectum to have a bowel movement? no  Impact to quality of life? -     Stress Incontinence:  Do you leak urine with cough, sneeze, exercise? yes  How often do you leak with cough, sneeze, exercise?  daily  How much do you usually leak? More than drops   Do you wear a pad? no If so; -  Impact to quality of life? moderate    Urge Incontinence:  Do you often get sudden urges to urinate? yes  How often do have urges? daily  If so, do you leak with these urges? yes  How much do you usually leak? More than drops  Impact to quality of life? moderate    Voids/day:10  Nocturia: 2-3  Fluid intake: 5X16 ounce  Caffeine: 1-2    Urinating:  Difficulty starting urination or strain to void? no  Weak or intermittent stream? no  Incomplete emptying or dribbling? no  Pain or burning with urination? no  Any blood in your urine? no    GI:  Constipation? no  Frequency stools daily    Straining for stools occ  Stool consistency normal     Ever leak stool (Accidental Bowel Leakage)? no      If so, how often?               -      If so, do you leak?                    -      Soiling without sensation? -  History of irritable bowel or Crohn's? -    Sexual/Pain:  Are you currently having sex?. no  Pain with sex?   -   Sexual Partner: no partner  Do any of these symptoms interfere with sex? -  Impact to quality of life? -    Prior therapy:  Ever done pelvic floor physical therapy? no  Trial of medication? no  Have you ever tried a pessary? no    Medical History:  Do you have?   High Cholesterol? yes     Diabetes? no  High Blood pressure? no     Recurrent UTIs? no  Sleep Apnea? no  Other medical problems: -    Surgical History:    Hysterectomy? no   Bladder Surgery? no   Other? no     OB/Gyn History:  Pregnancies? 2  Deliveries? 2  Vaginal 2  Section -  Current birth control? -  Periods? -  When was the first day of your last period? -  Last Pap smear? - Any abnormal? -  Last mammogram? UTD  Last colonoscopy? UTD    Medications/Vitamins/Supplements: reviewed    Drug Allergies: no    Latex Allergy: no  Iodine Allergy no    Family History: (list relationship and age at diagnosis)  Breast cancer? no   Ovarian cancer? no   Colon cancer? no  Other? no    Social History:  Marital status: -  Do you/ have you ever smoke(d)  cigarettes? no  Drink more than 1 alcoholic beverage a day?  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 no                   Unusual headaches? no  Leg pain w/ walking/exercise? no                       Easy bruising? no  Difficulty breathing w/ walking/exercise? no  Problems with vision? no  Dizziness, falls, or fainting? no  Excessive bleeding from cuts, gums, surgery? no  Other: no    Past Medical History:   Diagnosis Date     Hypercalcemia 2017     Hyperparathyroidism, primary (H)      Hypovitaminosis D 2016     Low bone density 10/2019       Past Surgical History:   Procedure Laterality Date     TUBAL LIGATION         Social History     Socioeconomic History     Marital status:  Single     Spouse name: Not on file     Number of children: Not on file     Years of education: Not on file     Highest education level: Not on file   Occupational History     Not on file   Tobacco Use     Smoking status: Never Smoker     Smokeless tobacco: Never Used   Substance and Sexual Activity     Alcohol use: No     Drug use: No     Sexual activity: Never     Birth control/protection: None   Other Topics Concern     Parent/sibling w/ CABG, MI or angioplasty before 65F 55M? Not Asked   Social History Narrative     , 3 grandkids, 2 children on eboy , oe girl, boy , single, works as      Social Determinants of Health     Financial Resource Strain:      Difficulty of Paying Living Expenses:    Food Insecurity:      Worried About Running Out of Food in the Last Year:      Ran Out of Food in the Last Year:    Transportation Needs:      Lack of Transportation (Medical):      Lack of Transportation (Non-Medical):    Physical Activity:      Days of Exercise per Week:      Minutes of Exercise per Session:    Stress:      Feeling of Stress :    Social Connections:      Frequency of Communication with Friends and Family:      Frequency of Social Gatherings with Friends and Family:      Attends Hinduism Services:      Active Member of Clubs or Organizations:      Attends Club or Organization Meetings:      Marital Status:    Intimate Partner Violence:      Fear of Current or Ex-Partner:      Emotionally Abused:      Physically Abused:      Sexually Abused:        Family History   Problem Relation Age of Onset     Diabetes Son 25        on insulin     Glaucoma No family hx of      Macular Degeneration No family hx of      Nephrolithiasis No family hx of      Hypercalcemia No family hx of      Thyroid Disease No family hx of        ROS    No Known Allergies    Current Outpatient Medications   Medication     acetaminophen (TYLENOL) 325 MG tablet     atorvastatin (LIPITOR) 10 MG tablet      augmented betamethasone dipropionate (DIPROLENE-AF) 0.05 % external cream     GLUCOSAMINE SULFATE PO     terbinafine (LAMISIL) 250 MG tablet     VITAMIN D, CHOLECALCIFEROL, PO     No current facility-administered medications for this visit.       There were no vitals taken for this visit. No LMP recorded. Patient is postmenopausal. There is no height or weight on file to calculate BMI.  Ms. Contreras is alert, comfortable in no acute distress, non-labored breathing.     A/P: Charu Lerma is a 56 year old F with mixed urinary incontinence    Will schedule face to face for a physical exam.    Ronn Deluca MD  Professor, OB/GYN  Urogynecologist  CC  Patient Care Team:  Chio Tovar MD as PCP - General (Family Practice)  Lukas Ashton MD as Assigned PCP  Lauren Longo MD as Assigned Endocrinology Provider  Nannette Causey MD as Assigned OBGYN Provider  Jena Curtis PA-C as Physician Assistant (Dermatology)  Jena Curtis PA-C as Assigned Surgical Provider  Ronn Deluca MD as MD (OB/Gyn)  Jena Kim MD as MD (Endocrinology, Diabetes, and Metabolism)  VANNESSA MOON                Video End Time:4:33 PM    Originating Location (pt. Location): Home    Distant Location (provider location):  [facility]     Platform used for Video Visit: [email or web address]

## 2021-10-06 NOTE — LETTER
10/6/2021       RE: Charu Lerma  [address] [place] [address]     Dear Colleague,    Thank you for referring your patient, Chaur Lerma, to the [facility] [place] [facility]. Please see a copy of my visit note below.    Charu is a 56 year old who is being evaluated via a billable video visit.      How would you like to obtain your AVS? MyChart  If the video visit is dropped, the invitation should be resent by: Text to cell phone: [phone]   Will anyone else be joining your video visit? No      Video Start Time: 4:18 PM  Video-Visit Details    Type of service:  Video Visit    October 6, 2021    Referring Provider: Anitha Terry MD  [address] [place] [address]    Primary Care Provider: Chio Tovar    CC: mixed urinary incontinence    HPI:  Charu Lerma is a 56 year old female who presents for evaluation of her pelvic floor symptoms.  She has urgency and urge incontinence. Also with GA. S>U      Prolapse:  Do you feel a vaginal bulge? no                                      Pressure? no   Do you have to place your fingers in the vagina or in the rectum to have a bowel movement? no  Impact to quality of life? -     Stress Incontinence:  Do you leak urine with cough, sneeze, exercise? yes  How often do you leak with cough, sneeze, exercise?  daily  How much do you usually leak? More than drops   Do you wear a pad? no If so; -  Impact to quality of life? moderate    Urge Incontinence:  Do you often get sudden urges to urinate? yes  How often do have urges? daily  If so, do you leak with these urges? yes  How much do you usually leak? More than drops  Impact to quality of life? moderate    Voids/day:10  Nocturia: 2-3  Fluid intake: 5X16 ounce  Caffeine: 1-2    Urinating:  Difficulty starting urination or strain to void? no  Weak or intermittent  stream? no  Incomplete emptying or dribbling? no  Pain or burning with urination? no  Any blood in your urine? no    GI:  Constipation? no  Frequency stools daily    Straining for stools occ  Stool consistency normal     Ever leak stool (Accidental Bowel Leakage)? no      If so, how often?               -      If so, do you leak?                   -      Soiling without sensation? -  History of irritable bowel or Crohn's? -    Sexual/Pain:  Are you currently having sex?. no  Pain with sex?   -   Sexual Partner: no partner  Do any of these symptoms interfere with sex? -  Impact to quality of life? -    Prior therapy:  Ever done pelvic floor physical therapy? no  Trial of medication? no  Have you ever tried a pessary? no    Medical History:  Do you have?   High Cholesterol? yes     Diabetes? no  High Blood pressure? no     Recurrent UTIs? no  Sleep Apnea? no  Other medical problems: -    Surgical History:    Hysterectomy? no   Bladder Surgery? no   Other? no     OB/Gyn History:  Pregnancies? 2  Deliveries? 2  Vaginal 2  Section -  Current birth control? -  Periods? -  When was the first day of your last period? -  Last Pap smear? - Any abnormal? -  Last mammogram? UTD  Last colonoscopy? UTD    Medications/Vitamins/Supplements: reviewed    Drug Allergies: no    Latex Allergy: no  Iodine Allergy no    Family History: (list relationship and age at diagnosis)  Breast cancer? no   Ovarian cancer? no   Colon cancer? no  Other? no    Social History:  Marital status: -  Do you/ have you ever smoke(d)  cigarettes? no  Drink more than 1 alcoholic beverage a day?  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 no                   Unusual headaches? no  Leg pain w/ walking/exercise? no                       Easy bruising? no  Difficulty breathing w/ walking/exercise? no  Problems with vision? no  Dizziness, falls, or fainting? no  Excessive bleeding  from cuts, gums, surgery? no  Other: no    Past Medical History:   Diagnosis Date     Hypercalcemia 2017     Hyperparathyroidism, primary (H)      Hypovitaminosis D 2016     Low bone density 10/2019       Past Surgical History:   Procedure Laterality Date     TUBAL LIGATION         Social History     Socioeconomic History     Marital status: Single     Spouse name: Not on file     Number of children: Not on file     Years of education: Not on file     Highest education level: Not on file   Occupational History     Not on file   Tobacco Use     Smoking status: Never Smoker     Smokeless tobacco: Never Used   Substance and Sexual Activity     Alcohol use: No     Drug use: No     Sexual activity: Never     Birth control/protection: None   Other Topics Concern     Parent/sibling w/ CABG, MI or angioplasty before 65F 55M? Not Asked   Social History Narrative     , 3 grandkids, 2 children on eboy , oe girl, boy , single, works as      Social Determinants of Health     Financial Resource Strain:      Difficulty of Paying Living Expenses:    Food Insecurity:      Worried About Running Out of Food in the Last Year:      Ran Out of Food in the Last Year:    Transportation Needs:      Lack of Transportation (Medical):      Lack of Transportation (Non-Medical):    Physical Activity:      Days of Exercise per Week:      Minutes of Exercise per Session:    Stress:      Feeling of Stress :    Social Connections:      Frequency of Communication with Friends and Family:      Frequency of Social Gatherings with Friends and Family:      Attends Advent Services:      Active Member of Clubs or Organizations:      Attends Club or Organization Meetings:      Marital Status:    Intimate Partner Violence:      Fear of Current or Ex-Partner:      Emotionally Abused:      Physically Abused:      Sexually Abused:        Family History   Problem Relation Age of Onset     Diabetes Son 25        on insulin      Glaucoma No family hx of      Macular Degeneration No family hx of      Nephrolithiasis No family hx of      Hypercalcemia No family hx of      Thyroid Disease No family hx of        ROS    No Known Allergies    Current Outpatient Medications   Medication     acetaminophen (TYLENOL) 325 MG tablet     atorvastatin (LIPITOR) 10 MG tablet     augmented betamethasone dipropionate (DIPROLENE-AF) 0.05 % external cream     GLUCOSAMINE SULFATE PO     terbinafine (LAMISIL) 250 MG tablet     VITAMIN D, CHOLECALCIFEROL, PO     No current facility-administered medications for this visit.       There were no vitals taken for this visit. No LMP recorded. Patient is postmenopausal. There is no height or weight on file to calculate BMI.  Ms. Contreras is alert, comfortable in no acute distress, non-labored breathing.     A/P: Charu Lerma is a 56 year old F with mixed urinary incontinence    Will schedule face to face for a physical exam.    Ronn Deluca MD  Professor, OB/GYN  Urogynecologist  CC  Patient Care Team:  Chio Tovar MD as PCP - General (Family Practice)  Lukas Ashton MD as Assigned PCP  Lauren Longo MD as Assigned Endocrinology Provider  Nannette Causey MD as Assigned OBGYN Provider  Jena Curtis PA-C as Physician Assistant (Dermatology)  Jena Curtis PA-C as Assigned Surgical Provider  Ronn Deluca MD as MD (OB/Gyn)  Jena Kim MD as MD (Endocrinology, Diabetes, and Metabolism)  VANNESSA MOON                Video End Time:4:33 PM    Originating Location (pt. Location): Home    Distant Location (provider location):  [facility]     Platform used for Video Visit: Amanda

## 2021-10-06 NOTE — TELEPHONE ENCOUNTER
Called Charu about test results.  1. A1c is 5.&%, decreased from last year, will continue diet/lifestyle changes and recheck in 3-6 months.  2. Elevated total and LDL chol, ASCVD 16%, will benefit from a mod intensity statin, started her on lipitor 10 mg daily, will recheck in 6 months. Check LFT in 1 month  3. Elevated calcium, hx of hyper PTH, appt with endocrinology on oct 22nd, will check PTH and phosphorus as well.   Discuss the plan with her

## 2021-10-12 ENCOUNTER — TELEPHONE (OUTPATIENT)
Dept: UROLOGY | Facility: CLINIC | Age: 56
End: 2021-10-12

## 2021-10-12 ENCOUNTER — HOSPITAL ENCOUNTER (OUTPATIENT)
Dept: MAMMOGRAPHY | Facility: CLINIC | Age: 56
Discharge: HOME | End: 2021-10-12
Attending: INTERNAL MEDICINE | Admitting: INTERNAL MEDICINE
Payer: COMMERCIAL

## 2021-10-12 DIAGNOSIS — Z00.00 ANNUAL PHYSICAL EXAM: ICD-10-CM

## 2021-10-12 PROCEDURE — 77063 BREAST TOMOSYNTHESIS BI: CPT

## 2021-10-12 NOTE — TELEPHONE ENCOUNTER
[facility] Call Center    Phone Message    May a detailed message be left on voicemail: yes     Reason for Call: Other: . pt is calling,  wanted her to be seen in person for an exam, however, she doesn't want to wait until his first opening of 11/10- I advised her I could add to the wait list, she would like to switch providers because of that, I advised I would have to send an encounter due to wanting to switch providers , please call Charu, thank you    Action Taken: Message routed to:  Other: maya rn    Travel Screening: Not Applicable

## 2021-10-13 ENCOUNTER — TELEPHONE (OUTPATIENT)
Dept: OBGYN | Facility: CLINIC | Age: 56
End: 2021-10-13

## 2021-10-13 NOTE — TELEPHONE ENCOUNTER
----- Message from Samantha Alonso RN sent at 10/13/2021 12:04 PM CDT -----  Regarding: Patient returning call to scheduling  Re: appointment with Dr. Deluca.

## 2021-10-13 NOTE — TELEPHONE ENCOUNTER
Spoke with patient, scheduled appointment with Dr. Deluca for 10/27 at 2pm. Patient is aware of appointment date/time and address.    Tarah Devine  Clinical Services Assistant

## 2021-10-19 ENCOUNTER — LAB (OUTPATIENT)
Dept: LAB | Facility: CLINIC | Age: 56
End: 2021-10-19
Payer: COMMERCIAL

## 2021-10-19 DIAGNOSIS — E21.0 PRIMARY HYPERPARATHYROIDISM (H): ICD-10-CM

## 2021-10-19 PROBLEM — F32.9 MAJOR DEPRESSION: Status: ACTIVE | Noted: 2017-11-16

## 2021-10-19 LAB
PHOSPHATE SERPL-MCNC: 2.7 MG/DL (ref 2.5–4.5)
PTH-INTACT SERPL-MCNC: 194 PG/ML (ref 18–80)

## 2021-10-19 PROCEDURE — 84100 ASSAY OF PHOSPHORUS: CPT

## 2021-10-19 PROCEDURE — 83970 ASSAY OF PARATHORMONE: CPT

## 2021-10-19 PROCEDURE — 36415 COLL VENOUS BLD VENIPUNCTURE: CPT

## 2021-10-19 NOTE — RESULT ENCOUNTER NOTE
Vj Box,    This is to inform you regarding your test result.    I am covering for Dr. Terry today.  Your parathyroid hormone level is elevated  Your phosphorus level is normal  You should keep your appointment with endocrinologist as a scheduled  They can discuss with you further evaluation and management      Sincerely,      Dr.Nasima Za MD,FACP

## 2021-10-22 ENCOUNTER — OFFICE VISIT (OUTPATIENT)
Dept: ENDOCRINOLOGY | Facility: CLINIC | Age: 56
End: 2021-10-22
Attending: INTERNAL MEDICINE
Payer: COMMERCIAL

## 2021-10-22 VITALS — OXYGEN SATURATION: 97 % | DIASTOLIC BLOOD PRESSURE: 80 MMHG | SYSTOLIC BLOOD PRESSURE: 126 MMHG | HEART RATE: 68 BPM

## 2021-10-22 DIAGNOSIS — E21.0 PRIMARY HYPERPARATHYROIDISM (H): Primary | ICD-10-CM

## 2021-10-22 PROCEDURE — 99215 OFFICE O/P EST HI 40 MIN: CPT | Performed by: INTERNAL MEDICINE

## 2021-10-22 NOTE — PATIENT INSTRUCTIONS
1) Complete bone density test  2) Complete neck CT scan  3) Visit with the surgeon about the test      Robbin Hobbs MD   [address]   [place] [address]   Phone: [phone]

## 2021-10-22 NOTE — LETTER
10/22/2021         RE: Charu Lerma  [address] [place] [address]        Dear Colleague,    Thank you for referring your patient, Charu Lerma, to the [facility] [place]. Please see a copy of my visit note below.    Charu Lerma is a 56 year old yo female who presents today for evaluation of hyperparathyroidism.    Subjective:  Charu Lerma is a 56 year old female   Chief Complaint   Patient presents with     Thyroid Problem       1)Hypercalcemia/ Hyperparathyroidism  - Hypercalcemia noted as far back as 2017, was noted again Diagnosed 2020 on laboratory   - Evaluated by Dr Longo 2020-- completed 24hr urine, parathyroid ultrasound did not reveal any enlarged adenoma however there was questionable quality.  Overall diagnosis consistent with primary hyperparathyroidism.  -DEXA scan at that time did not include wrist evaluation however had borderline findings especially at the level of the spine (T -2.1) prompting consideration/discussion of surgery at the time.  -Patient was trying to avoid surgery as much as possible and has yet to meet with a surgeon.    INTERVAL HISTORY:  -Notes intermittent constipation  -Denies history of nephrolithiasis, no history of fractures  -Notes intermittent confusion/fatigue  -Recent calcium level rising as checked per primary care physician    7/18/2019  PTH: 206  Ca (7/2): 10.5  Vit D 28    10/4/2019  Ca 10.5 (7/2019)  Cr 0.64 (7/2019)  Ca Urine 13.4 mg/dL  Cr Urine: 42 mg/dL  Ca:Cr ratio: 0.019    9/6/2020 Vit D 34    10/19/2021    Ca (9/30): 11.3     Active diagnoses this visit:  Primary hyperparathyroidism (H)     ROS: 10 point ROS neg other than the symptoms noted above in the HPI.      Medical, surgical, social, and family histories, medications and allergies reviewed and updated.  Past Medical History:   Diagnosis Date     Hypercalcemia 2017     Hyperparathyroidism,  primary (H)      Hypovitaminosis D 2016     Low bone density 10/2019       Past Surgical History:   Procedure Laterality Date     TUBAL LIGATION       Allergies:  Patient has no known allergies.    Social History     Tobacco Use     Smoking status: Never Smoker     Smokeless tobacco: Never Used   Substance Use Topics     Alcohol use: No       Family History   Problem Relation Age of Onset     Diabetes Son 25        on insulin     Glaucoma No family hx of      Macular Degeneration No family hx of      Nephrolithiasis No family hx of      Hypercalcemia No family hx of      Thyroid Disease No family hx of      Objective:  There were no vitals taken for this visit.    Exam:  Constitutional: healthy, alert, no acute distress  Head: Normocephalic. No masses, lesions, no exophthalmos/proptosis  ENT: normal thyroid, no palpable nodules, no cervical lymph nodes  Respiratory: nonlabored  Gastrointestinal: Abdomen soft, non-tender.  Musculoskeletal: extremities normal- no gross deformities noted, gait normal and normal muscle tone  Skin: no suspicious lesions or rashes  Neurologic: Gait normal. sensation grossly intact  Psychiatric: mentation appears normal, calm      Lab Results   Component Value Date/Time    TSH 3.35 09/06/2020 11:06 AM    PTHI 194 (H) 10/19/2021 09:22 AM    PTHI 206 (H) 07/18/2019 08:34 AM     Last Comprehensive Metabolic Panel:  Sodium   Date Value Ref Range Status   09/30/2021 137 133 - 144 mmol/L Final   05/30/2020 136 133 - 144 mmol/L Final     Potassium   Date Value Ref Range Status   09/30/2021 4.4 3.4 - 5.3 mmol/L Final   05/30/2020 3.6 3.4 - 5.3 mmol/L Final     Chloride   Date Value Ref Range Status   09/30/2021 106 94 - 109 mmol/L Final   05/30/2020 105 94 - 109 mmol/L Final     Carbon Dioxide   Date Value Ref Range Status   05/30/2020 24 20 - 32 mmol/L Final     Carbon Dioxide (CO2)   Date Value Ref Range Status   09/30/2021 28 20 - 32 mmol/L Final     Anion Gap   Date Value Ref Range Status    09/30/2021 3 3 - 14 mmol/L Final   05/30/2020 7 3 - 14 mmol/L Final     Glucose   Date Value Ref Range Status   09/30/2021 91 70 - 99 mg/dL Final   05/30/2020 147 (H) 70 - 99 mg/dL Final     Urea Nitrogen   Date Value Ref Range Status   09/30/2021 14 7 - 30 mg/dL Final   05/30/2020 9 7 - 30 mg/dL Final     Creatinine   Date Value Ref Range Status   09/30/2021 0.68 0.52 - 1.04 mg/dL Final   05/30/2020 0.65 0.52 - 1.04 mg/dL Final     GFR Estimate   Date Value Ref Range Status   09/30/2021 >90 >60 mL/min/1.73m2 Final     Comment:     As of July 11, 2021, eGFR is calculated by the CKD-EPI creatinine equation, without race adjustment. eGFR can be influenced by muscle mass, exercise, and diet. The reported eGFR is an estimation only and is only applicable if the renal function is stable.   05/30/2020 >90 >60 mL/min/[1.73_m2] Final     Comment:     Non  GFR Calc  Starting 12/18/2018, serum creatinine based estimated GFR (eGFR) will be   calculated using the Chronic Kidney Disease Epidemiology Collaboration   (CKD-EPI) equation.       Calcium   Date Value Ref Range Status   09/30/2021 11.3 (H) 8.5 - 10.1 mg/dL Final   05/30/2020 10.8 (H) 8.5 - 10.1 mg/dL Final     DEXA 10/2019:               Lumbar Spine (L1-L4)       T-score:  -2.1    Degenerative and/or osteosclerotic changes are present, falsely improving result.                   Left Femoral Neck            T-score:  -1.2               Right Femoral Neck          T-score:  -1.3        Thyroid Ultrasound 8/2020:  US HEAD NECK SOFT TISSUE 8/20/2020 2:30 PM     CLINICAL HISTORY: hyperparathyroidism; Primary hyperparathyroidism  (H). Elevated serum calcium levels.  TECHNIQUE: Thyroid ultrasound.      COMPARISON: None      FINDINGS:  RIGHT lobe: 3.5 x 1.1 x 1.6 cm. Homogeneous echotexture.  Isthmus: 2 mm.  LEFT lobe: 3.6 x 1.1 x 1.4 cm. Homogeneous echotexture.     NECK: No cervical lymphadenopathy.      No parathyroid adenomas are evident.                                                                       IMPRESSION:  Normal thyroid. No parathyroid adenomas are sonographically evident in  the neck. No cervical lymphadenopathy.      ASSESSMENT / PLAN:  (E21.0) Primary hyperparathyroidism (H)      1) Primary Hyperparathyroidism  -Diagnosed based on hypercalcemia, elevated PTH and elevated urinary 24 hr calcium  - Surgical indications for hyperparathyroidism include: 1) Serum calcium (>upper limit of normal): 1.0 mg/dL (0.25 mmol/L); 2) BMD by DXA: T-score ?2.5 at lumbar spine, total hip, femoral neck, or distal 1/3 radius;  3) Vertebral fracture by x-ray, CT, MRI, or VFA; 4) Creatinine clearance <60 cc/min; 24-h urine for calcium >400 mg/d (>10 mmol/d) and increased stone risk by biochemical stone risk analysis; 5) Presence of nephrolithiasis or nephrocalcinosis by x-ray, ultrasound, or CT; 6) Age <50 years  -Patient meets surgical criteria based on her most recent calcium level completed in September Of 11.3.,  In addition her DEXA scan 2 years ago was borderline, so we will repeat it at this time including forearm for further risk stratification.  -We discussed multiple treatment options including surgical management versus medical management versus watchful waiting.  I strongly recommended surgical management given patient's general good health and young age.  She is agreeable to meet with surgeon at this time.  In preparation for this visit, will order CT neck.      Return to clinic: 3 months or post-operatively    A total of 43 minutes were spent today 10/22/21 on this visit including documentation, chart review with greater than 50% of time spent on direct counseling.        Again, thank you for allowing me to participate in the care of your patient.        Sincerely,        Jena Kim MD

## 2021-10-22 NOTE — NURSING NOTE
Chief Complaint   Patient presents with     Thyroid Problem       Vitals:    10/22/21 0753   BP: 126/80   BP Location: Left arm   Patient Position: Sitting   Cuff Size: Adult Regular   Pulse: 68   SpO2: 97%       There is no height or weight on file to calculate BMI.     Suki Guillen MA

## 2021-10-22 NOTE — PROGRESS NOTES
Charu Lerma is a 56 year old yo female who presents today for evaluation of hyperparathyroidism.    Subjective:  Charu Lerma is a 56 year old female   Chief Complaint   Patient presents with     Thyroid Problem       1)Hypercalcemia/ Hyperparathyroidism  - Hypercalcemia noted as far back as 2017, was noted again Diagnosed 2020 on laboratory   - Evaluated by Dr Longo 2020-- completed 24hr urine, parathyroid ultrasound did not reveal any enlarged adenoma however there was questionable quality.  Overall diagnosis consistent with primary hyperparathyroidism.  -DEXA scan at that time did not include wrist evaluation however had borderline findings especially at the level of the spine (T -2.1) prompting consideration/discussion of surgery at the time.  -Patient was trying to avoid surgery as much as possible and has yet to meet with a surgeon.    INTERVAL HISTORY:  -Notes intermittent constipation  -Denies history of nephrolithiasis, no history of fractures  -Notes intermittent confusion/fatigue  -Recent calcium level rising as checked per primary care physician    7/18/2019  PTH: 206  Ca (7/2): 10.5  Vit D 28    10/4/2019  Ca 10.5 (7/2019)  Cr 0.64 (7/2019)  Ca Urine 13.4 mg/dL  Cr Urine: 42 mg/dL  Ca:Cr ratio: 0.019    9/6/2020 Vit D 34    10/19/2021    Ca (9/30): 11.3     Active diagnoses this visit:  Primary hyperparathyroidism (H)     ROS: 10 point ROS neg other than the symptoms noted above in the HPI.      Medical, surgical, social, and family histories, medications and allergies reviewed and updated.  Past Medical History:   Diagnosis Date     Hypercalcemia 2017     Hyperparathyroidism, primary (H)      Hypovitaminosis D 2016     Low bone density 10/2019       Past Surgical History:   Procedure Laterality Date     TUBAL LIGATION       Allergies:  Patient has no known allergies.    Social History     Tobacco Use     Smoking status: Never Smoker     Smokeless  tobacco: Never Used   Substance Use Topics     Alcohol use: No       Family History   Problem Relation Age of Onset     Diabetes Son 25        on insulin     Glaucoma No family hx of      Macular Degeneration No family hx of      Nephrolithiasis No family hx of      Hypercalcemia No family hx of      Thyroid Disease No family hx of      Objective:  There were no vitals taken for this visit.    Exam:  Constitutional: healthy, alert, no acute distress  Head: Normocephalic. No masses, lesions, no exophthalmos/proptosis  ENT: normal thyroid, no palpable nodules, no cervical lymph nodes  Respiratory: nonlabored  Gastrointestinal: Abdomen soft, non-tender.  Musculoskeletal: extremities normal- no gross deformities noted, gait normal and normal muscle tone  Skin: no suspicious lesions or rashes  Neurologic: Gait normal. sensation grossly intact  Psychiatric: mentation appears normal, calm      Lab Results   Component Value Date/Time    TSH 3.35 09/06/2020 11:06 AM    PTHI 194 (H) 10/19/2021 09:22 AM    PTHI 206 (H) 07/18/2019 08:34 AM     Last Comprehensive Metabolic Panel:  Sodium   Date Value Ref Range Status   09/30/2021 137 133 - 144 mmol/L Final   05/30/2020 136 133 - 144 mmol/L Final     Potassium   Date Value Ref Range Status   09/30/2021 4.4 3.4 - 5.3 mmol/L Final   05/30/2020 3.6 3.4 - 5.3 mmol/L Final     Chloride   Date Value Ref Range Status   09/30/2021 106 94 - 109 mmol/L Final   05/30/2020 105 94 - 109 mmol/L Final     Carbon Dioxide   Date Value Ref Range Status   05/30/2020 24 20 - 32 mmol/L Final     Carbon Dioxide (CO2)   Date Value Ref Range Status   09/30/2021 28 20 - 32 mmol/L Final     Anion Gap   Date Value Ref Range Status   09/30/2021 3 3 - 14 mmol/L Final   05/30/2020 7 3 - 14 mmol/L Final     Glucose   Date Value Ref Range Status   09/30/2021 91 70 - 99 mg/dL Final   05/30/2020 147 (H) 70 - 99 mg/dL Final     Urea Nitrogen   Date Value Ref Range Status   09/30/2021 14 7 - 30 mg/dL Final    05/30/2020 9 7 - 30 mg/dL Final     Creatinine   Date Value Ref Range Status   09/30/2021 0.68 0.52 - 1.04 mg/dL Final   05/30/2020 0.65 0.52 - 1.04 mg/dL Final     GFR Estimate   Date Value Ref Range Status   09/30/2021 >90 >60 mL/min/1.73m2 Final     Comment:     As of July 11, 2021, eGFR is calculated by the CKD-EPI creatinine equation, without race adjustment. eGFR can be influenced by muscle mass, exercise, and diet. The reported eGFR is an estimation only and is only applicable if the renal function is stable.   05/30/2020 >90 >60 mL/min/[1.73_m2] Final     Comment:     Non  GFR Calc  Starting 12/18/2018, serum creatinine based estimated GFR (eGFR) will be   calculated using the Chronic Kidney Disease Epidemiology Collaboration   (CKD-EPI) equation.       Calcium   Date Value Ref Range Status   09/30/2021 11.3 (H) 8.5 - 10.1 mg/dL Final   05/30/2020 10.8 (H) 8.5 - 10.1 mg/dL Final     DEXA 10/2019:               Lumbar Spine (L1-L4)       T-score:  -2.1    Degenerative and/or osteosclerotic changes are present, falsely improving result.                   Left Femoral Neck            T-score:  -1.2               Right Femoral Neck          T-score:  -1.3        Thyroid Ultrasound 8/2020:  US HEAD NECK SOFT TISSUE 8/20/2020 2:30 PM     CLINICAL HISTORY: hyperparathyroidism; Primary hyperparathyroidism  (H). Elevated serum calcium levels.  TECHNIQUE: Thyroid ultrasound.      COMPARISON: None      FINDINGS:  RIGHT lobe: 3.5 x 1.1 x 1.6 cm. Homogeneous echotexture.  Isthmus: 2 mm.  LEFT lobe: 3.6 x 1.1 x 1.4 cm. Homogeneous echotexture.     NECK: No cervical lymphadenopathy.      No parathyroid adenomas are evident.                                                                      IMPRESSION:  Normal thyroid. No parathyroid adenomas are sonographically evident in  the neck. No cervical lymphadenopathy.      ASSESSMENT / PLAN:  (E21.0) Primary hyperparathyroidism (H)      1) Primary  Hyperparathyroidism  -Diagnosed based on hypercalcemia, elevated PTH and elevated urinary 24 hr calcium  - Surgical indications for hyperparathyroidism include: 1) Serum calcium (>upper limit of normal): 1.0 mg/dL (0.25 mmol/L); 2) BMD by DXA: T-score ?2.5 at lumbar spine, total hip, femoral neck, or distal 1/3 radius;  3) Vertebral fracture by x-ray, CT, MRI, or VFA; 4) Creatinine clearance <60 cc/min; 24-h urine for calcium >400 mg/d (>10 mmol/d) and increased stone risk by biochemical stone risk analysis; 5) Presence of nephrolithiasis or nephrocalcinosis by x-ray, ultrasound, or CT; 6) Age <50 years  -Patient meets surgical criteria based on her most recent calcium level completed in September Of 11.3.,  In addition her DEXA scan 2 years ago was borderline, so we will repeat it at this time including forearm for further risk stratification.  -We discussed multiple treatment options including surgical management versus medical management versus watchful waiting.  I strongly recommended surgical management given patient's general good health and young age.  She is agreeable to meet with surgeon at this time.  In preparation for this visit, will order CT neck.      Return to clinic: 3 months or post-operatively    A total of 43 minutes were spent today 10/22/21 on this visit including documentation, chart review with greater than 50% of time spent on direct counseling.

## 2021-10-25 ENCOUNTER — HOSPITAL ENCOUNTER (OUTPATIENT)
Dept: CT IMAGING | Facility: CLINIC | Age: 56
Discharge: HOME | End: 2021-10-25
Attending: INTERNAL MEDICINE | Admitting: INTERNAL MEDICINE
Payer: COMMERCIAL

## 2021-10-25 DIAGNOSIS — E21.0 PRIMARY HYPERPARATHYROIDISM (H): ICD-10-CM

## 2021-10-25 PROCEDURE — 250N000009 HC RX 250: Performed by: INTERNAL MEDICINE

## 2021-10-25 PROCEDURE — 70492 CT SFT TSUE NCK W/O & W/DYE: CPT

## 2021-10-25 PROCEDURE — 250N000011 HC RX IP 250 OP 636: Performed by: INTERNAL MEDICINE

## 2021-10-25 RX ORDER — IOPAMIDOL 755 MG/ML
75 INJECTION, SOLUTION INTRAVASCULAR ONCE
Status: COMPLETED | OUTPATIENT
Start: 2021-10-25 | End: 2021-10-25

## 2021-10-25 RX ADMIN — IOPAMIDOL 75 ML: 755 INJECTION, SOLUTION INTRAVENOUS at 08:03

## 2021-10-25 RX ADMIN — SODIUM CHLORIDE 80 ML: 9 INJECTION, SOLUTION INTRAVENOUS at 08:06

## 2021-10-27 ENCOUNTER — OFFICE VISIT (OUTPATIENT)
Dept: UROLOGY | Facility: CLINIC | Age: 56
End: 2021-10-27
Attending: OBSTETRICS & GYNECOLOGY
Payer: COMMERCIAL

## 2021-10-27 VITALS
HEART RATE: 78 BPM | HEIGHT: 63 IN | SYSTOLIC BLOOD PRESSURE: 146 MMHG | BODY MASS INDEX: 26.86 KG/M2 | WEIGHT: 151.6 LBS | DIASTOLIC BLOOD PRESSURE: 89 MMHG

## 2021-10-27 DIAGNOSIS — N39.3 STRESS INCONTINENCE OF URINE: Primary | ICD-10-CM

## 2021-10-27 DIAGNOSIS — N39.41 URGE INCONTINENCE OF URINE: ICD-10-CM

## 2021-10-27 PROCEDURE — 99213 OFFICE O/P EST LOW 20 MIN: CPT | Performed by: OBSTETRICS & GYNECOLOGY

## 2021-10-27 PROCEDURE — G0463 HOSPITAL OUTPT CLINIC VISIT: HCPCS

## 2021-10-27 ASSESSMENT — PAIN SCALES - GENERAL: PAINLEVEL: NO PAIN (0)

## 2021-10-27 ASSESSMENT — MIFFLIN-ST. JEOR: SCORE: 1236.65

## 2021-10-27 NOTE — PROGRESS NOTES
"October 27, 2021    Return visit    Patient returns today for PE after video visit 10/6/21.     BP (!) 146/89   Pulse 78   Ht 1.6 m (5' 2.99\")   Wt 68.8 kg (151 lb 9.6 oz)   BMI 26.86 kg/m    She is comfortable, in no distress, non-labored breathing.  Abdomen is soft, non-tender, non-distended.  Normal external female genitalia.  Speculum and bimanual exam are remarkable for good support.    SST - negative    A/P: 56 year old F with mixed incontinence    Refer to PFPT. F/U in 2-3 months    Ronn Deluca MD  Professor, OB/GYN  Urogynecologist    CC  Patient Care Team:  Chio Tovar MD as PCP - General (Family Practice)  Lukas Ashton MD as Assigned PCP  Lauren Longo MD as Assigned Endocrinology Provider  Nannette Causey MD as Assigned OBGYN Provider  Jena Curtis PA-C as Physician Assistant (Dermatology)  Jena Curtis PA-C as Assigned Surgical Provider  Ronn Deluca MD as MD (OB/Gyn)  Jena Kim MD as MD (Endocrinology, Diabetes, and Metabolism)      "

## 2021-10-27 NOTE — LETTER
"10/27/2021       RE: Charu Lerma  [address] [place] [address]     Dear Colleague,    Thank you for referring your patient, Charu Lerma, to the [facility] [place] at [facility]. Please see a copy of my visit note below.    October 27, 2021    Return visit    Patient returns today for PE after video visit 10/6/21.     BP (!) 146/89   Pulse 78   Ht 1.6 m (5' 2.99\")   Wt 68.8 kg (151 lb 9.6 oz)   BMI 26.86 kg/m    She is comfortable, in no distress, non-labored breathing.  Abdomen is soft, non-tender, non-distended.  Normal external female genitalia.  Speculum and bimanual exam are remarkable for good support.    SST - negative    A/P: 56 year old F with mixed incontinence    Refer to PFPT. F/U in 2-3 months    Ronn Deluca MD  Professor, OB/GYN  Urogynecologist    CC  Patient Care Team:  Chio Tovar MD as PCP - General (Family Practice)  Lukas Ashton MD as Assigned PCP  Lauren Longo MD as Assigned Endocrinology Provider  Nannette Causey MD as Assigned OBGYN Provider  Jena Curtis PA-C as Physician Assistant (Dermatology)  Jena Curtis PA-C as Assigned Surgical Provider  Ronn Deluca MD as MD (OB/Gyn)  Jena Kim MD as MD (Endocrinology, Diabetes, and Metabolism)      "

## 2021-10-28 ENCOUNTER — OFFICE VISIT (OUTPATIENT)
Dept: FAMILY MEDICINE | Facility: CLINIC | Age: 56
End: 2021-10-28
Payer: COMMERCIAL

## 2021-10-28 VITALS
HEIGHT: 63 IN | BODY MASS INDEX: 26.58 KG/M2 | TEMPERATURE: 96.2 F | WEIGHT: 150 LBS | RESPIRATION RATE: 20 BRPM | HEART RATE: 68 BPM | SYSTOLIC BLOOD PRESSURE: 115 MMHG | DIASTOLIC BLOOD PRESSURE: 66 MMHG | OXYGEN SATURATION: 97 %

## 2021-10-28 DIAGNOSIS — E21.0 PRIMARY HYPERPARATHYROIDISM (H): Primary | ICD-10-CM

## 2021-10-28 DIAGNOSIS — B35.1 ONYCHOMYCOSIS: ICD-10-CM

## 2021-10-28 DIAGNOSIS — E78.5 HYPERLIPIDEMIA, UNSPECIFIED HYPERLIPIDEMIA TYPE: ICD-10-CM

## 2021-10-28 DIAGNOSIS — Z12.4 SCREENING FOR CERVICAL CANCER: ICD-10-CM

## 2021-10-28 DIAGNOSIS — N90.89 LABIAL LESION: ICD-10-CM

## 2021-10-28 PROCEDURE — G0145 SCR C/V CYTO,THINLAYER,RESCR: HCPCS | Performed by: INTERNAL MEDICINE

## 2021-10-28 PROCEDURE — 99214 OFFICE O/P EST MOD 30 MIN: CPT | Performed by: INTERNAL MEDICINE

## 2021-10-28 PROCEDURE — 87624 HPV HI-RISK TYP POOLED RSLT: CPT | Performed by: INTERNAL MEDICINE

## 2021-10-28 ASSESSMENT — MIFFLIN-ST. JEOR: SCORE: 1229.53

## 2021-10-28 NOTE — PROGRESS NOTES
"  Assessment & Plan     Primary hyperparathyroidism (H)  She has appointment with surgeon to discuss about parathyroidectomy.  According to endocrinology notes, surgery is indicated given her elevated calcium levels and osteopenia on DEXA scan.  - Vitamin D Deficiency; Future    Onychomycosis  Taking terbinafine. check liver function periodically.  - Hepatic panel (Albumin, ALT, AST, Bili, Alk Phos, TP); Future    Hyperlipidemia, unspecified hyperlipidemia type  Check lipid profile in 1 month.  We will.  Continue atorvastatin 10 mg daily  - Lipid Profile; Future    Screening for cervical cancer  - Pap Screen with HPV - recommended age 30 - 65 years    Labial lesion:  Discussed about 2 mm lesion on the left labia.  Patient reported she has noticed this for 1 year.  No discharge or tenderness.  Discussed about seeing gynecology for biopsy.  Deferred for later by patient       See Patient Instructions    Return if symptoms worsen or fail to improve.    VANNESSA MOON MD  [facility] [place] Box is a 56 year old who presents for the following health issues     NOHELIA Box is a 56-year-old lady who presented to the clinic for follow-up.  She has primary hyperparathyroidism.  She was recently seen by endocrinology and was recommended to see surgeon for surgery of parathyroid adenoma.   She was recently started on atorvastatin 10 mg daily.  She is taking terbinafine for onychomycosis.  She is here for Pap smear.  No history of abnormal Paps.      Review of Systems         Objective    /66 (BP Location: Right arm, Patient Position: Chair, Cuff Size: Adult Regular)   Pulse 68   Temp (!) 96.2  F (35.7  C) (Temporal)   Resp 20   Ht 1.6 m (5' 3\")   Wt 68 kg (150 lb)   SpO2 97%   BMI 26.57 kg/m    Body mass index is 26.57 kg/m .  Physical Exam  Genitourinary:     Vagina: Normal.      Cervix: Normal.                  "

## 2021-11-01 LAB
BKR LAB AP GYN ADEQUACY: NORMAL
BKR LAB AP GYN INTERPRETATION: NORMAL
BKR LAB AP HPV REFLEX: NORMAL
BKR LAB AP PREVIOUS ABNORMAL: NORMAL
PATH REPORT.COMMENTS IMP SPEC: NORMAL
PATH REPORT.RELEVANT HX SPEC: NORMAL

## 2021-11-03 LAB
HUMAN PAPILLOMA VIRUS 16 DNA: NEGATIVE
HUMAN PAPILLOMA VIRUS 18 DNA: NEGATIVE
HUMAN PAPILLOMA VIRUS FINAL DIAGNOSIS: NORMAL
HUMAN PAPILLOMA VIRUS OTHER HR: NEGATIVE

## 2021-11-08 ENCOUNTER — ANCILLARY PROCEDURE (OUTPATIENT)
Dept: BONE DENSITY | Facility: CLINIC | Age: 56
End: 2021-11-08
Attending: INTERNAL MEDICINE
Payer: COMMERCIAL

## 2021-11-08 DIAGNOSIS — E21.0 PRIMARY HYPERPARATHYROIDISM (H): ICD-10-CM

## 2021-11-08 PROCEDURE — 77080 DXA BONE DENSITY AXIAL: CPT | Performed by: INTERNAL MEDICINE

## 2021-11-16 ENCOUNTER — OFFICE VISIT (OUTPATIENT)
Dept: SURGERY | Facility: CLINIC | Age: 56
End: 2021-11-16
Payer: COMMERCIAL

## 2021-11-16 VITALS — SYSTOLIC BLOOD PRESSURE: 130 MMHG | HEART RATE: 71 BPM | DIASTOLIC BLOOD PRESSURE: 80 MMHG

## 2021-11-16 DIAGNOSIS — E21.0 PRIMARY HYPERPARATHYROIDISM (H): Primary | ICD-10-CM

## 2021-11-16 PROCEDURE — 99204 OFFICE O/P NEW MOD 45 MIN: CPT | Performed by: SURGERY

## 2021-11-16 NOTE — PROGRESS NOTES
Surgery Consultation, [facility], YESSY Hobbs MD, MD    Charu Lerma MRN# [id number]   YOB: 1965 Age: 56 year old     PCP:  Chio Tovar [phone]    Chief Complaint: Hyperparathyroidism    Pt was seen in consultation from Chio Tovar.    History of Present Illness:  Charu Lerma is a 56 year old female who presented with hypercalcemia.  This was noted in 2017 and she was evaluated by endocrinology sometime later.  Imaging was limited but did not reveal any obvious suggestion of adenoma.  No history of nephrolithiasis and bone health was good.  Patient is clinically euthyroid.  Her calcium has continued to go up and when she met with another endocrinologist, was found to be as high as 11.3.  PTH was checked and was 194.  Most recent bone scan suggested some mild osteopenia.  Does complain of some sleep disturbances and occasional difficulty focusing.  She has had some imaging since presentation and a high-resolution CT scan suggested a sizable suspicious lesion in the right tracheoesophageal groove.    PMH:  Charu Lerma  has a past medical history of Hypercalcemia (2017), Hyperparathyroidism, primary (H), Hypovitaminosis D (2016), and Low bone density (10/2019).  PSH:  Charu Lerma  has a past surgical history that includes tubal ligation.    Home medications and allergies reviewed.    Social History:  Charu Lerma  reports that she has never smoked. She has never used smokeless tobacco. She reports that she does not drink alcohol and does not use drugs.  Family History:  Charu Lerma family history includes Diabetes (age of onset: 25) in her son.    ROS:  The 10 point Review of Systems is negative other than noted in the HPI.  Does complain of difficulty concentrating, poor sleep.    Physical Exam:  Blood pressure  130/80, pulse 71, not currently breastfeeding.  0 lbs 0 oz  Healthy-appearing female in no distress.  Patient has a pleasant affect and communicates well.   Pupils equal round and reactive to light.   No cervical lymphadenopathy or thyromegaly.   Lung fields clear, breathing comfortably.   Heart normal sinus rhythm.  No murmurs rubs or gallops.  Abdomen soft, nontender, nondistended.  Skin warm, dry.  No obvious rashes or lesions.    All new lab and imaging data was reviewed.  High-resolution neck CT revealed a probable right inferior parathyroid adenoma.     Assessment/plan: Beatriz 56-year-old female with primary hyperparathyroidism.  Does have a new diagnosis of osteopenia and her calcium has been significantly high.  She does have some somatic complaints of difficulty concentrating and focusing.  I explained that the primary motivation behind surgery is to prevent progressive hypercalcemia with resultant bone loss and osteoporosis.  Her scan was quite convincing of a right-sided inferior parathyroid adenoma.  I explained that 85% of hyperparathyroidism is caused by single parathyroid adenomas.  The other 15% represents dual adenomas or parathyroid hyperplasia.  I recommended a right-sided neck exploration with excision of parathyroid adenoma.  This would be accompanied by rapid intraoperative PTH assay and the recurrent laryngeal nerve monitor.  Risks of surgery were explained to the patient including bleeding, infection, failure to cure, hypoparathyroidism, and injury to the recurrent laryngeal nerve.  She was interested in getting the surgery scheduled soon as possible.  Surgical co-morbities include hyperlipidemia, osteopenia, depression.    Orlando Hobbs M.D.  [facility]  [phone]    Please route or send letter to:  Primary Care Provider (PCP) and Referring Provider

## 2021-11-16 NOTE — LETTER
November 16, 2021          Jena Kim MD  [facility] [place] [address]      RE:   Charu Lerma 1965      Dear Colleague,    Thank you for referring your patient, Charu Lerma, to [facility] [place] location. Please see a copy of my visit note below.    Chief Complaint: Hyperparathyroidism     Pt was seen in consultation from Chio Tovar.     History of Present Illness:  Charu Lerma is a 56 year old female who presented with hypercalcemia.  This was noted in 2017 and she was evaluated by endocrinology sometime later.  Imaging was limited but did not reveal any obvious suggestion of adenoma.  No history of nephrolithiasis and bone health was good.  Patient is clinically euthyroid.  Her calcium has continued to go up and when she met with another endocrinologist, was found to be as high as 11.3.  PTH was checked and was 194.  Most recent bone scan suggested some mild osteopenia.  Does complain of some sleep disturbances and occasional difficulty focusing.  She has had some imaging since presentation and a high-resolution CT scan suggested a sizable suspicious lesion in the right tracheoesophageal groove.     PMH:  Charu Lerma  has a past medical history of Hypercalcemia (2017), Hyperparathyroidism, primary (H), Hypovitaminosis D (2016), and Low bone density (10/2019).  PSH:  Charu Lerma  has a past surgical history that includes tubal ligation.     Home medications and allergies reviewed.     Social History:  Charu Lerma  reports that she has never smoked. She has never used smokeless tobacco. She reports that she does not drink alcohol and does not use drugs.  Family History:  Charu Lerma family history includes Diabetes (age of onset: 25) in her son.     ROS:  The 10 point Review of Systems is negative other than noted in the  HPI.  Does complain of difficulty concentrating, poor sleep.     Physical Exam:  Blood pressure 130/80, pulse 71, not currently breastfeeding.  0 lbs 0 oz  Healthy-appearing female in no distress.  Patient has a pleasant affect and communicates well.   Pupils equal round and reactive to light.   No cervical lymphadenopathy or thyromegaly.   Lung fields clear, breathing comfortably.   Heart normal sinus rhythm.  No murmurs rubs or gallops.  Abdomen soft, nontender, nondistended.  Skin warm, dry.  No obvious rashes or lesions.     All new lab and imaging data was reviewed.  High-resolution neck CT revealed a probable right inferior parathyroid adenoma.      Assessment/plan: Pleasant 56-year-old female with primary hyperparathyroidism.  Does have a new diagnosis of osteopenia and her calcium has been significantly high.  She does have some somatic complaints of difficulty concentrating and focusing.  I explained that the primary motivation behind surgery is to prevent progressive hypercalcemia with resultant bone loss and osteoporosis.  Her scan was quite convincing of a right-sided inferior parathyroid adenoma.  I explained that 85% of hyperparathyroidism is caused by single parathyroid adenomas.  The other 15% represents dual adenomas or parathyroid hyperplasia.  I recommended a right-sided neck exploration with excision of parathyroid adenoma.  This would be accompanied by rapid intraoperative PTH assay and the recurrent laryngeal nerve monitor.  Risks of surgery were explained to the patient including bleeding, infection, failure to cure, hypoparathyroidism, and injury to the recurrent laryngeal nerve.  She was interested in getting the surgery scheduled soon as possible.  Surgical co-morbities include hyperlipidemia, osteopenia, depression.           Again, thank you for allowing me to participate in the care of your patient.      Sincerely,      Robbin Hobbs MD

## 2021-11-19 ENCOUNTER — OFFICE VISIT (OUTPATIENT)
Dept: ENDOCRINOLOGY | Facility: CLINIC | Age: 56
End: 2021-11-19
Payer: COMMERCIAL

## 2021-11-19 ENCOUNTER — TELEPHONE (OUTPATIENT)
Dept: SURGERY | Facility: CLINIC | Age: 56
End: 2021-11-19

## 2021-11-19 VITALS
DIASTOLIC BLOOD PRESSURE: 77 MMHG | HEART RATE: 76 BPM | WEIGHT: 154 LBS | SYSTOLIC BLOOD PRESSURE: 130 MMHG | BODY MASS INDEX: 27.28 KG/M2

## 2021-11-19 DIAGNOSIS — E21.0 PRIMARY HYPERPARATHYROIDISM (H): Primary | ICD-10-CM

## 2021-11-19 PROCEDURE — 99214 OFFICE O/P EST MOD 30 MIN: CPT | Performed by: INTERNAL MEDICINE

## 2021-11-19 NOTE — LETTER
11/19/2021         RE: Charu Lerma  [address] [place] [address]        Dear Colleague,    Thank you for referring your patient, Charu Lerma, to the [facility]. Please see a copy of my visit note below.    Charu Lerma is a 56 year old yo female who presents today for follow-up of primary hyperparathyroidism. She was last seen by me 10/22. She is accompanied today by her sister.       Subjective:  Charu Lerma is a 56 year old female   Chief Complaint   Patient presents with     Hyperparathyroidism       1) Primary Hyperparathyroidism  HPI in brief: Hypercalcemia noted since 2017 and was referred to Dr Longo in 2020 who completed evaluation consistent with primary hyperparathyroidism. DEXA (no wrist) revealed T score of -2.1 at lowest and was recommended to see a surgeon at that time. Saw me 10/22 for further evaluation. At the time, had PTH level of 194 and calcium of 11.3 (ULN 10.1)    INTERVAL HISTORY:  - Completed CT neck which revealed R inferior nodule consistent with parathyroid adenoma  - Saw ANAY Hobbs for evaluation and is now scheduled for parathyroidectomy on 12/23.  - DEXA updated but again did not include the wrist, patient noted she even told the tech she needed wrist to be included  - Concerned about kidney stones, says has occasional low back pain but nothing persistent.       Active diagnoses this visit:  Data Unavailable     ROS: 10 point ROS neg other than the symptoms noted above in the HPI.      Medical, surgical, social, and family histories, medications and allergies reviewed and updated.  Past Medical History:   Diagnosis Date     Hypercalcemia 2017     Hyperparathyroidism, primary (H)      Hypovitaminosis D 2016     Low bone density 10/2019       Past Surgical History:   Procedure Laterality Date     TUBAL LIGATION       Allergies:  Patient has no known allergies.    Social  History     Tobacco Use     Smoking status: Never Smoker     Smokeless tobacco: Never Used   Substance Use Topics     Alcohol use: No       Family History   Problem Relation Age of Onset     Diabetes Son 25        on insulin     Glaucoma No family hx of      Macular Degeneration No family hx of      Nephrolithiasis No family hx of      Hypercalcemia No family hx of      Thyroid Disease No family hx of        Objective:  /77   Pulse 76   Wt 69.9 kg (154 lb)   BMI 27.28 kg/m      Exam:  Constitutional: healthy, alert, no acute distress  Head: Normocephalic. No masses, lesions, no exophthalmos/proptosis  ENT: no visible goiter  Respiratory: nonlabored  Gastrointestinal: Abdomen soft, non-tender.  Musculoskeletal: extremities normal- no gross deformities noted, gait normal and normal muscle tone  Skin: no suspicious lesions or rashes  Neurologic: Gait normal. sensation grossly intact  Psychiatric: mentation appears normal, calm      Lab Results   Component Value Date/Time    TSH 3.35 09/06/2020 11:06 AM    PTHI 194 (H) 10/19/2021 09:22 AM    PTHI 206 (H) 07/18/2019 08:34 AM     Last Comprehensive Metabolic Panel:  Sodium   Date Value Ref Range Status   09/30/2021 137 133 - 144 mmol/L Final   05/30/2020 136 133 - 144 mmol/L Final     Potassium   Date Value Ref Range Status   09/30/2021 4.4 3.4 - 5.3 mmol/L Final   05/30/2020 3.6 3.4 - 5.3 mmol/L Final     Chloride   Date Value Ref Range Status   09/30/2021 106 94 - 109 mmol/L Final   05/30/2020 105 94 - 109 mmol/L Final     Carbon Dioxide   Date Value Ref Range Status   05/30/2020 24 20 - 32 mmol/L Final     Carbon Dioxide (CO2)   Date Value Ref Range Status   09/30/2021 28 20 - 32 mmol/L Final     Anion Gap   Date Value Ref Range Status   09/30/2021 3 3 - 14 mmol/L Final   05/30/2020 7 3 - 14 mmol/L Final     Glucose   Date Value Ref Range Status   09/30/2021 91 70 - 99 mg/dL Final   05/30/2020 147 (H) 70 - 99 mg/dL Final     Urea Nitrogen   Date Value Ref  Range Status   09/30/2021 14 7 - 30 mg/dL Final   05/30/2020 9 7 - 30 mg/dL Final     Creatinine   Date Value Ref Range Status   09/30/2021 0.68 0.52 - 1.04 mg/dL Final   05/30/2020 0.65 0.52 - 1.04 mg/dL Final     GFR Estimate   Date Value Ref Range Status   09/30/2021 >90 >60 mL/min/1.73m2 Final     Comment:     As of July 11, 2021, eGFR is calculated by the CKD-EPI creatinine equation, without race adjustment. eGFR can be influenced by muscle mass, exercise, and diet. The reported eGFR is an estimation only and is only applicable if the renal function is stable.   05/30/2020 >90 >60 mL/min/[1.73_m2] Final     Comment:     Non  GFR Calc  Starting 12/18/2018, serum creatinine based estimated GFR (eGFR) will be   calculated using the Chronic Kidney Disease Epidemiology Collaboration   (CKD-EPI) equation.       Calcium   Date Value Ref Range Status   09/30/2021 11.3 (H) 8.5 - 10.1 mg/dL Final   05/30/2020 10.8 (H) 8.5 - 10.1 mg/dL Final     PTH- 194    CT Neck 10/25/2021: IMPRESSION:  1. Enhancing nodule in the right tracheoesophageal groove beginning at  the level of the inferior aspect of the right lobe of the thyroid  gland and extending inferiorly measuring 1.1 x 1.3 x 1.8 cm with an  enhancement pattern that is highly suspicious for a parathyroid  adenoma.  2. Otherwise, normal soft tissue neck CT.    11/8 DEXA: Independently reviewed myself-- overall no signficant change from 2021 to 2019. No wrist completed  Lumbar Spine (L2-L4)      T-score:  -2.2, marked degenerative changes present at L1, so only L2-4 are evaluated.                Left Femoral Neck            T-score:  -0.9               Right Femoral Neck          T-score:  -1.1                   Lumbar (L2-L4) BMD: 0.944 Previous: 0.917                          Total Hip Mean BMD: 0.975  Previous: 0.938    ASSESSMENT / PLAN:      1) Primary Hyperparathyroidism  - Surgical indication includes Calcium >1 above ULN  - Scheduled for  parathyroidectomy Dec 23  - Plan to follow-up with me in clinic in January  - Reviewed no indication for imaging looking for nephrolithiasis in absence of clinical symptoms, doesn't change mangement  - No wrist completed in DEXA, but already planning for surgery so will hold for now.  - Encouraged adequate hydration for calcium control prior to surgery    Return to clinic: January    A total of 30 minutes were spent today 11/19/21 on this visit including chart review, history and counseling, documentation and other activities as detailed above.         Again, thank you for allowing me to participate in the care of your patient.        Sincerely,        Jena Kim MD

## 2021-11-19 NOTE — PROGRESS NOTES
Charu Lerma is a 56 year old yo female who presents today for follow-up of primary hyperparathyroidism. She was last seen by me 10/22. She is accompanied today by her sister.       Subjective:  Charu Lerma is a 56 year old female   Chief Complaint   Patient presents with     Hyperparathyroidism       1) Primary Hyperparathyroidism  HPI in brief: Hypercalcemia noted since 2017 and was referred to Dr Longo in 2020 who completed evaluation consistent with primary hyperparathyroidism. DEXA (no wrist) revealed T score of -2.1 at lowest and was recommended to see a surgeon at that time. Saw me 10/22 for further evaluation. At the time, had PTH level of 194 and calcium of 11.3 (ULN 10.1)    INTERVAL HISTORY:  - Completed CT neck which revealed R inferior nodule consistent with parathyroid adenoma  - Saw ANAY Hobbs for evaluation and is now scheduled for parathyroidectomy on 12/23.  - DEXA updated but again did not include the wrist, patient noted she even told the tech she needed wrist to be included  - Concerned about kidney stones, says has occasional low back pain but nothing persistent.       Active diagnoses this visit:  Data Unavailable     ROS: 10 point ROS neg other than the symptoms noted above in the HPI.      Medical, surgical, social, and family histories, medications and allergies reviewed and updated.  Past Medical History:   Diagnosis Date     Hypercalcemia 2017     Hyperparathyroidism, primary (H)      Hypovitaminosis D 2016     Low bone density 10/2019       Past Surgical History:   Procedure Laterality Date     TUBAL LIGATION       Allergies:  Patient has no known allergies.    Social History     Tobacco Use     Smoking status: Never Smoker     Smokeless tobacco: Never Used   Substance Use Topics     Alcohol use: No       Family History   Problem Relation Age of Onset     Diabetes Son 25        on insulin     Glaucoma No family hx of      Macular Degeneration No  family hx of      Nephrolithiasis No family hx of      Hypercalcemia No family hx of      Thyroid Disease No family hx of        Objective:  /77   Pulse 76   Wt 69.9 kg (154 lb)   BMI 27.28 kg/m      Exam:  Constitutional: healthy, alert, no acute distress  Head: Normocephalic. No masses, lesions, no exophthalmos/proptosis  ENT: no visible goiter  Respiratory: nonlabored  Gastrointestinal: Abdomen soft, non-tender.  Musculoskeletal: extremities normal- no gross deformities noted, gait normal and normal muscle tone  Skin: no suspicious lesions or rashes  Neurologic: Gait normal. sensation grossly intact  Psychiatric: mentation appears normal, calm      Lab Results   Component Value Date/Time    TSH 3.35 09/06/2020 11:06 AM    PTHI 194 (H) 10/19/2021 09:22 AM    PTHI 206 (H) 07/18/2019 08:34 AM     Last Comprehensive Metabolic Panel:  Sodium   Date Value Ref Range Status   09/30/2021 137 133 - 144 mmol/L Final   05/30/2020 136 133 - 144 mmol/L Final     Potassium   Date Value Ref Range Status   09/30/2021 4.4 3.4 - 5.3 mmol/L Final   05/30/2020 3.6 3.4 - 5.3 mmol/L Final     Chloride   Date Value Ref Range Status   09/30/2021 106 94 - 109 mmol/L Final   05/30/2020 105 94 - 109 mmol/L Final     Carbon Dioxide   Date Value Ref Range Status   05/30/2020 24 20 - 32 mmol/L Final     Carbon Dioxide (CO2)   Date Value Ref Range Status   09/30/2021 28 20 - 32 mmol/L Final     Anion Gap   Date Value Ref Range Status   09/30/2021 3 3 - 14 mmol/L Final   05/30/2020 7 3 - 14 mmol/L Final     Glucose   Date Value Ref Range Status   09/30/2021 91 70 - 99 mg/dL Final   05/30/2020 147 (H) 70 - 99 mg/dL Final     Urea Nitrogen   Date Value Ref Range Status   09/30/2021 14 7 - 30 mg/dL Final   05/30/2020 9 7 - 30 mg/dL Final     Creatinine   Date Value Ref Range Status   09/30/2021 0.68 0.52 - 1.04 mg/dL Final   05/30/2020 0.65 0.52 - 1.04 mg/dL Final     GFR Estimate   Date Value Ref Range Status   09/30/2021 >90 >60  mL/min/1.73m2 Final     Comment:     As of July 11, 2021, eGFR is calculated by the CKD-EPI creatinine equation, without race adjustment. eGFR can be influenced by muscle mass, exercise, and diet. The reported eGFR is an estimation only and is only applicable if the renal function is stable.   05/30/2020 >90 >60 mL/min/[1.73_m2] Final     Comment:     Non  GFR Calc  Starting 12/18/2018, serum creatinine based estimated GFR (eGFR) will be   calculated using the Chronic Kidney Disease Epidemiology Collaboration   (CKD-EPI) equation.       Calcium   Date Value Ref Range Status   09/30/2021 11.3 (H) 8.5 - 10.1 mg/dL Final   05/30/2020 10.8 (H) 8.5 - 10.1 mg/dL Final     PTH- 194    CT Neck 10/25/2021: IMPRESSION:  1. Enhancing nodule in the right tracheoesophageal groove beginning at  the level of the inferior aspect of the right lobe of the thyroid  gland and extending inferiorly measuring 1.1 x 1.3 x 1.8 cm with an  enhancement pattern that is highly suspicious for a parathyroid  adenoma.  2. Otherwise, normal soft tissue neck CT.    11/8 DEXA: Independently reviewed myself-- overall no signficant change from 2021 to 2019. No wrist completed  Lumbar Spine (L2-L4)      T-score:  -2.2, marked degenerative changes present at L1, so only L2-4 are evaluated.                Left Femoral Neck            T-score:  -0.9               Right Femoral Neck          T-score:  -1.1                   Lumbar (L2-L4) BMD: 0.944 Previous: 0.917                          Total Hip Mean BMD: 0.975  Previous: 0.938    ASSESSMENT / PLAN:      1) Primary Hyperparathyroidism  - Surgical indication includes Calcium >1 above ULN  - Scheduled for parathyroidectomy Dec 23  - Plan to follow-up with me in clinic in January  - Reviewed no indication for imaging looking for nephrolithiasis in absence of clinical symptoms, doesn't change mangement  - No wrist completed in DEXA, but already planning for surgery so will hold for now.  -  Encouraged adequate hydration for calcium control prior to surgery    Return to clinic: January    A total of 30 minutes were spent today 11/19/21 on this visit including chart review, history and counseling, documentation and other activities as detailed above.

## 2021-11-19 NOTE — TELEPHONE ENCOUNTER
Type of surgery: Right neck exploration, excision parathyroid adenoma  Location of surgery: [facility]  Date and time of surgery: 12/23/21 at 7:30am  Surgeon: Dr. Robbin Hobbs  Pre-Op Appt Date: Patient to schedule  Post-Op Appt Date: Patient to schedule   Packet sent out: Yes  Pre-cert/Authorization completed:  Not Applicable  Date: 11/19/21

## 2021-11-30 ENCOUNTER — NURSE TRIAGE (OUTPATIENT)
Dept: FAMILY MEDICINE | Facility: CLINIC | Age: 56
End: 2021-11-30
Payer: COMMERCIAL

## 2021-11-30 ENCOUNTER — TELEPHONE (OUTPATIENT)
Dept: URGENT CARE | Facility: URGENT CARE | Age: 56
End: 2021-11-30
Payer: COMMERCIAL

## 2021-11-30 DIAGNOSIS — Z11.59 ENCOUNTER FOR SCREENING FOR OTHER VIRAL DISEASES: ICD-10-CM

## 2021-11-30 NOTE — TELEPHONE ENCOUNTER
Patient Contact    Attempt # 1 - called patient to triage symptoms     Was call answered?  No.  Left message on voicemail with information to call back.    Alice PRADO, Triage RN  [facility]

## 2021-11-30 NOTE — TELEPHONE ENCOUNTER
General Call:     Who is calling:  Charu     Reason for Call:  Needs to set a pre op before 12/23/21    What are your questions or concerns:  Could she be able to take a spot before her procedure?    Date of last appointment with provider: 10/28/21    Okay to leave a detailed message:Yes at Cell number on file:    Telephone Information:   Mobile [phone]

## 2021-11-30 NOTE — TELEPHONE ENCOUNTER
"Pt called back, triaged, scheduled:   Pt agreed to call back if new/worsening symptoms     1. APPEARANCE of BLOOD: \"What color is it?\" \"Is it passed separately, on the surface of the stool, or mixed in with the stool?\" bright red, can see with wiping on paper   2. AMOUNT: \"How much blood was passed?\" small amount   3. FREQUENCY: \"How many times has blood been passed with the stools?\" weekly   4. ONSET: \"When was the blood first seen in the stools?\" (Days or weeks) 3 months ago  5. DIARRHEA: \"Is there also some diarrhea?\" If so, ask: \"How many diarrhea stools were passed in past 24 hours?\" sometimes diarrhea and sometimes constipation - uses a tea that causes diarrhea   6. CONSTIPATION: \"Do you have constipation?\" If so, \"How bad is it?\" constipation sometimes   7. RECURRENT SYMPTOMS: \"Have you had blood in your stools before?\" If so, ask: \"When was the last time?\" and \"What happened that time?\" had a long time ago, colon exam was normal   8. BLOOD THINNERS: \"Do you take any blood thinners?\" (e.g., Coumadin/warfarin, Pradaxa/dabigatran, aspirin) none   9. OTHER SYMPTOMS: \"Do you have any other symptoms?\" (e.g., abdominal pain, vomiting, dizziness, fever)denies these, pain on right side of rectal area     Next 5 appointments (look out 90 days)    Dec 02, 2021 10:00 AM  (Arrive by 9:40 AM)  Office Visit with CHAPIS [facility] ([facility] ) [address] [facility], [address]  [facility] [id number] [phone]   Dec 20, 2021  9:30 AM  Return Visit with  Surg Cons Nurse  [facility] ([facility] [place]) [address], [address]  [facility] [id number] [phone]   Dec 20, 2021 10:30 AM  (Arrive by 10:10 AM)  Pre-Operative Physical with Loyda Pires PA-C  [facility] ([facility] ) [address] [facility], [address]  [facility] [id number] [phone]    "     Reason for Disposition    MILD rectal bleeding (more than just a few drops or streaks)    Additional Information    Negative: Passed out (i.e., fainted, collapsed and was not responding)    Negative: Shock suspected (e.g., cold/pale/clammy skin, too weak to stand, low BP, rapid pulse)    Negative: Vomiting red blood or black (coffee ground) material    Negative: Sounds like a life-threatening emergency to the triager    Negative: SEVERE rectal bleeding (large blood clots; on and off, or constant bleeding)    Negative: SEVERE dizziness (e.g., unable to stand, requires support to walk, feels like passing out now)    Negative: MODERATE rectal bleeding (small blood clots, passing blood without stool, or toilet water turns red) more than once a day    Negative: Bloody, black, or tarry bowel movements (Exception: chronic-unchanged  black-grey bowel movements and is taking iron pills or Pepto-bismol)    Negative: High-risk adult (e.g., prior surgery on aorta, abdominal aortic aneurysm)    Negative: Rectal foreign body (inserted or swallowed)    Negative: SEVERE abdominal pain (e.g., excruciating)    Negative: Constant abdominal pain lasting > 2 hours    Negative: Pale skin (pallor) of new onset or worsening    Negative: Patient sounds very sick or weak to the triager    Negative: MODERATE rectal bleeding (small blood clots, passing blood without stool, or toilet water turns red)    Negative: Taking Coumadin (warfarin) or other strong blood thinner, or known bleeding disorder (e.g., thrombocytopenia)    Negative: Colonoscopy in past 72 hours    Negative: Known cirrhosis of the liver (or history of liver failure or ascites)    Negative: Patient wants to be seen    Protocols used: RECTAL BLEEDING-A-OH

## 2021-11-30 NOTE — TELEPHONE ENCOUNTER
Patient having blood in stool 1x a week. She uses vaseline when this happens.  Also having low back pain sometimes. Is this a problem for her surgery? Ok to leave a detailed message.

## 2021-12-02 ENCOUNTER — OFFICE VISIT (OUTPATIENT)
Dept: FAMILY MEDICINE | Facility: CLINIC | Age: 56
End: 2021-12-02
Payer: COMMERCIAL

## 2021-12-02 VITALS
BODY MASS INDEX: 27.37 KG/M2 | DIASTOLIC BLOOD PRESSURE: 80 MMHG | TEMPERATURE: 97.7 F | SYSTOLIC BLOOD PRESSURE: 136 MMHG | HEART RATE: 77 BPM | RESPIRATION RATE: 16 BRPM | OXYGEN SATURATION: 97 % | WEIGHT: 154.5 LBS

## 2021-12-02 DIAGNOSIS — B35.1 ONYCHOMYCOSIS: ICD-10-CM

## 2021-12-02 DIAGNOSIS — K62.5 BRIGHT RED BLOOD PER RECTUM: Primary | ICD-10-CM

## 2021-12-02 DIAGNOSIS — E78.5 HYPERLIPIDEMIA, UNSPECIFIED HYPERLIPIDEMIA TYPE: ICD-10-CM

## 2021-12-02 DIAGNOSIS — H53.9 VISION CHANGES: ICD-10-CM

## 2021-12-02 DIAGNOSIS — E21.0 PRIMARY HYPERPARATHYROIDISM (H): ICD-10-CM

## 2021-12-02 DIAGNOSIS — Z11.4 SCREENING FOR HIV (HUMAN IMMUNODEFICIENCY VIRUS): ICD-10-CM

## 2021-12-02 DIAGNOSIS — Z11.59 ENCOUNTER FOR SCREENING FOR OTHER VIRAL DISEASES: ICD-10-CM

## 2021-12-02 LAB
DEPRECATED CALCIDIOL+CALCIFEROL SERPL-MC: 29 UG/L (ref 20–75)
ERYTHROCYTE [DISTWIDTH] IN BLOOD BY AUTOMATED COUNT: 14 % (ref 10–15)
HCT VFR BLD AUTO: 41.8 % (ref 35–47)
HGB BLD-MCNC: 13.8 G/DL (ref 11.7–15.7)
HIV 1+2 AB+HIV1 P24 AG SERPL QL IA: NONREACTIVE
MCH RBC QN AUTO: 30.9 PG (ref 26.5–33)
MCHC RBC AUTO-ENTMCNC: 33 G/DL (ref 31.5–36.5)
MCV RBC AUTO: 94 FL (ref 78–100)
PLATELET # BLD AUTO: 257 10E3/UL (ref 150–450)
RBC # BLD AUTO: 4.47 10E6/UL (ref 3.8–5.2)
WBC # BLD AUTO: 8.3 10E3/UL (ref 4–11)

## 2021-12-02 PROCEDURE — 87389 HIV-1 AG W/HIV-1&-2 AB AG IA: CPT | Performed by: PHYSICIAN ASSISTANT

## 2021-12-02 PROCEDURE — 85027 COMPLETE CBC AUTOMATED: CPT | Performed by: PHYSICIAN ASSISTANT

## 2021-12-02 PROCEDURE — 99214 OFFICE O/P EST MOD 30 MIN: CPT | Performed by: PHYSICIAN ASSISTANT

## 2021-12-02 PROCEDURE — 36415 COLL VENOUS BLD VENIPUNCTURE: CPT | Performed by: PHYSICIAN ASSISTANT

## 2021-12-02 PROCEDURE — 80061 LIPID PANEL: CPT | Performed by: PHYSICIAN ASSISTANT

## 2021-12-02 PROCEDURE — 82306 VITAMIN D 25 HYDROXY: CPT | Performed by: PHYSICIAN ASSISTANT

## 2021-12-02 PROCEDURE — 80053 COMPREHEN METABOLIC PANEL: CPT | Performed by: PHYSICIAN ASSISTANT

## 2021-12-02 PROCEDURE — 82248 BILIRUBIN DIRECT: CPT | Performed by: PHYSICIAN ASSISTANT

## 2021-12-02 RX ORDER — TERBINAFINE HYDROCHLORIDE 250 MG/1
TABLET ORAL
Qty: 60 TABLET | Refills: 1 | Status: CANCELLED | OUTPATIENT
Start: 2021-12-02

## 2021-12-02 RX ORDER — TERBINAFINE HYDROCHLORIDE 250 MG/1
TABLET ORAL
Qty: 60 TABLET | Refills: 0 | Status: SHIPPED | OUTPATIENT
Start: 2021-12-02 | End: 2022-04-27

## 2021-12-02 NOTE — PROGRESS NOTES
Assessment and Plan:     (K62.5) Bright red blood per rectum  (primary encounter diagnosis)  Comment: onset about 2 months ago, no dizziness, no lightheadedness, not on blood thinners, looks like last colonoscopy ?2015, patient unsure where/when, no obvious hemorrhoids on exam  Plan: Adult Gastro Ref - Procedure Only, CBC with         platelets, Basic metabolic panel  (Ca, Cl, CO2,        Creat, Gluc, K, Na, BUN)    (B35.1) Onychomycosis  Comment: on lamisil, would like Rxed to different pharmacy  Plan: terbinafine (LAMISIL) 250 MG tablet  Sent to [address] and zeb    (H53.9) Vision changes  Comment: would like referral for routine eye exam  Plan: Adult Eye Referral      Loyda Pires PA-C  30 minutes on the day of the encounter doing chart review, history and exam, documentation and further activities as noted above.        Shira Box is a 56 year old who presents for the following health issues    HPI     Chief Complaint   Patient presents with     Rectal Problem     rectal bleeding - please see TRIAGE notes      Rectal bleeding once per week x last couple months  BRB, just small amount  Has had before but never this consistently  She does have a history of constipation and uses a tea that works well for it  Last colonoscopy was 2015, normal, she denies history of polyps or family history of colon cancer    She denies dizziness/lightheadedness, abdominal pain, nausea/vomiting, fever/chills, diarrhea, black stool, not on blood thinners    Would like to see an ophthalmologist for routine eye exam, requesting referral    Review of Systems   See above      Objective    /80 (BP Location: Right arm, Patient Position: Sitting, Cuff Size: Adult Regular)   Pulse 77   Temp 97.7  F (36.5  C) (Oral)   Resp 16   Wt 70.1 kg (154 lb 8 oz)   SpO2 97%   Breastfeeding No   BMI 27.37 kg/m    Body mass index is 27.37 kg/m .     Physical Exam     GENERAL: healthy, alert and no distress  RESP:  lungs clear to auscultation - no rales, no rhonchi, no wheezes  CV: regular rates and rhythm, normal S1 S2, no S3 or S4 and no murmur, no click or rub   ABD: soft, NT, +BS, normal external rectal exam  MS: extremities- no gross deformities noted, no edema  SKIN: no suspicious lesions, no rashes

## 2021-12-02 NOTE — PATIENT INSTRUCTIONS
Okay to try an over the counter stool softener like senna as needed for hard stools.    Be sure to drink plenty of water--at least 6-8 glasses per day    GI referral for colonoscopy    Labs today

## 2021-12-03 LAB
ALBUMIN SERPL-MCNC: 4 G/DL (ref 3.4–5)
ALP SERPL-CCNC: 152 U/L (ref 40–150)
ALT SERPL W P-5'-P-CCNC: 29 U/L (ref 0–50)
ANION GAP SERPL CALCULATED.3IONS-SCNC: 4 MMOL/L (ref 3–14)
AST SERPL W P-5'-P-CCNC: 16 U/L (ref 0–45)
BILIRUB DIRECT SERPL-MCNC: <0.1 MG/DL (ref 0–0.2)
BILIRUB SERPL-MCNC: 0.4 MG/DL (ref 0.2–1.3)
BUN SERPL-MCNC: 10 MG/DL (ref 7–30)
CALCIUM SERPL-MCNC: 10.5 MG/DL (ref 8.5–10.1)
CHLORIDE BLD-SCNC: 106 MMOL/L (ref 94–109)
CHOLEST SERPL-MCNC: 184 MG/DL
CO2 SERPL-SCNC: 27 MMOL/L (ref 20–32)
CREAT SERPL-MCNC: 0.62 MG/DL (ref 0.52–1.04)
FASTING STATUS PATIENT QL REPORTED: YES
GFR SERPL CREATININE-BSD FRML MDRD: >90 ML/MIN/1.73M2
GLUCOSE BLD-MCNC: 91 MG/DL (ref 70–99)
HDLC SERPL-MCNC: 57 MG/DL
LDLC SERPL CALC-MCNC: 94 MG/DL
NONHDLC SERPL-MCNC: 127 MG/DL
POTASSIUM BLD-SCNC: 4.2 MMOL/L (ref 3.4–5.3)
PROT SERPL-MCNC: 8.3 G/DL (ref 6.8–8.8)
SODIUM SERPL-SCNC: 137 MMOL/L (ref 133–144)
TRIGL SERPL-MCNC: 166 MG/DL

## 2021-12-03 NOTE — RESULT ENCOUNTER NOTE
Dear Charu,     Here are your recent complete blood count results which are within the expected range. Please continue with your current plan of care and let us know if you have any questions or concerns.    Regards,  Loyda Pires PA-C

## 2021-12-06 NOTE — RESULT ENCOUNTER NOTE
Dear Charu,     Here are your recent results which show mildly elevated calcium levels which are stable.  Follow-up with endocrinology as planned.    Please let us know if you have any questions or concerns.    Regards,  Loyda Pires PA-C

## 2021-12-13 ENCOUNTER — THERAPY VISIT (OUTPATIENT)
Dept: PHYSICAL THERAPY | Facility: CLINIC | Age: 56
End: 2021-12-13
Attending: OBSTETRICS & GYNECOLOGY
Payer: COMMERCIAL

## 2021-12-13 DIAGNOSIS — N39.3 STRESS INCONTINENCE OF URINE: ICD-10-CM

## 2021-12-13 DIAGNOSIS — N39.41 URGE INCONTINENCE OF URINE: ICD-10-CM

## 2021-12-13 PROCEDURE — 97161 PT EVAL LOW COMPLEX 20 MIN: CPT | Mod: GP | Performed by: PHYSICAL THERAPIST

## 2021-12-13 PROCEDURE — 97110 THERAPEUTIC EXERCISES: CPT | Mod: GP | Performed by: PHYSICAL THERAPIST

## 2021-12-13 PROCEDURE — 97535 SELF CARE MNGMENT TRAINING: CPT | Mod: GP | Performed by: PHYSICAL THERAPIST

## 2021-12-14 NOTE — PROGRESS NOTES
[facility]    OUTPATIENT Physical Therapy ORTHOPEDIC EVALUATION  PLAN OF TREATMENT FOR OUTPATIENT REHABILITATION  (COMPLETE FOR INITIAL CLAIMS ONLY)  Patient's Last Name, First Name, M.I.  YOB: 1965  Charu Bundy    Provider s Name:  [facility]   Medical Record No.  [id number]   Start of Care Date:  12/12/21   Onset Date:   10/27/21 (MD referral date)   Type:     _X__PT   ___OT Medical Diagnosis:    Encounter Diagnoses   Name Primary?     Stress incontinence of urine      Urge incontinence of urine         Treatment Diagnosis:  mixed urinary incontinence        Goals:     12/13/21 0500   Urinary Leakage   Previous Functional Level No problems   Current Functional Level Number of urinary leakage episodes in a day   Performance Level 1-2x/day (small drops)   STG Target Performance Decrease urinary leakage episodes in a day to   Performance Level 0   Rationale continence throughout the day   Due Date 01/17/22   LTG Target Performance Decrease urinary leakage episodes in a week to   Performance Level 0   Rationale for healthy hygiene and to prevent skin breakdown   Due Date 02/21/22       Therapy Frequency:  1x/week for 4 week, 2x/month for 6 weeks  Predicted Duration of Therapy Intervention:  10 weeks    Cadence Guillen, PT                 I CERTIFY THE NEED FOR THESE SERVICES FURNISHED UNDER        THIS PLAN OF TREATMENT AND WHILE UNDER MY CARE     (Physician attestation of this document indicates review and certification of the therapy plan).                       Certification Date From:  12/13/21   Certification Date To:  02/21/22    Referring Provider:  Ronn Deluca    Initial Assessment        See Epic Evaluation SOC Date: 12/12/21

## 2021-12-17 ENCOUNTER — LAB (OUTPATIENT)
Dept: URGENT CARE | Facility: URGENT CARE | Age: 56
End: 2021-12-17
Attending: SURGERY
Payer: COMMERCIAL

## 2021-12-17 DIAGNOSIS — Z11.59 SPECIAL SCREENING EXAMINATION FOR VIRAL DISEASE: ICD-10-CM

## 2021-12-17 PROCEDURE — U0005 INFEC AGEN DETEC AMPLI PROBE: HCPCS

## 2021-12-17 PROCEDURE — U0003 INFECTIOUS AGENT DETECTION BY NUCLEIC ACID (DNA OR RNA); SEVERE ACUTE RESPIRATORY SYNDROME CORONAVIRUS 2 (SARS-COV-2) (CORONAVIRUS DISEASE [COVID-19]), AMPLIFIED PROBE TECHNIQUE, MAKING USE OF HIGH THROUGHPUT TECHNOLOGIES AS DESCRIBED BY CMS-2020-01-R: HCPCS

## 2021-12-18 LAB — SARS-COV-2 RNA RESP QL NAA+PROBE: NEGATIVE

## 2021-12-20 ENCOUNTER — OFFICE VISIT (OUTPATIENT)
Dept: FAMILY MEDICINE | Facility: CLINIC | Age: 56
End: 2021-12-20
Payer: COMMERCIAL

## 2021-12-20 VITALS
DIASTOLIC BLOOD PRESSURE: 88 MMHG | RESPIRATION RATE: 16 BRPM | TEMPERATURE: 97.8 F | BODY MASS INDEX: 26.93 KG/M2 | WEIGHT: 152 LBS | OXYGEN SATURATION: 98 % | SYSTOLIC BLOOD PRESSURE: 148 MMHG | HEART RATE: 68 BPM | HEIGHT: 63 IN

## 2021-12-20 DIAGNOSIS — E55.9 VITAMIN D DEFICIENCY: ICD-10-CM

## 2021-12-20 DIAGNOSIS — E21.0 PRIMARY HYPERPARATHYROIDISM (H): ICD-10-CM

## 2021-12-20 DIAGNOSIS — E78.5 HYPERLIPIDEMIA, UNSPECIFIED HYPERLIPIDEMIA TYPE: ICD-10-CM

## 2021-12-20 DIAGNOSIS — R03.0 ELEVATED BLOOD PRESSURE READING WITHOUT DIAGNOSIS OF HYPERTENSION: ICD-10-CM

## 2021-12-20 DIAGNOSIS — Z01.818 PREOP GENERAL PHYSICAL EXAM: Primary | ICD-10-CM

## 2021-12-20 PROCEDURE — 99214 OFFICE O/P EST MOD 30 MIN: CPT | Performed by: PHYSICIAN ASSISTANT

## 2021-12-20 PROCEDURE — U0005 INFEC AGEN DETEC AMPLI PROBE: HCPCS | Performed by: PHYSICIAN ASSISTANT

## 2021-12-20 PROCEDURE — 93000 ELECTROCARDIOGRAM COMPLETE: CPT | Performed by: PHYSICIAN ASSISTANT

## 2021-12-20 PROCEDURE — U0003 INFECTIOUS AGENT DETECTION BY NUCLEIC ACID (DNA OR RNA); SEVERE ACUTE RESPIRATORY SYNDROME CORONAVIRUS 2 (SARS-COV-2) (CORONAVIRUS DISEASE [COVID-19]), AMPLIFIED PROBE TECHNIQUE, MAKING USE OF HIGH THROUGHPUT TECHNOLOGIES AS DESCRIBED BY CMS-2020-01-R: HCPCS | Performed by: PHYSICIAN ASSISTANT

## 2021-12-20 RX ORDER — ATORVASTATIN CALCIUM 10 MG/1
10 TABLET, FILM COATED ORAL DAILY
Qty: 90 TABLET | Refills: 3 | Status: SHIPPED | OUTPATIENT
Start: 2021-12-20 | End: 2022-01-11

## 2021-12-20 ASSESSMENT — MIFFLIN-ST. JEOR: SCORE: 1238.6

## 2021-12-20 NOTE — H&P (VIEW-ONLY)
[address], [address]  [facility] [address]  Phone: [phone]  Primary Provider: Anitha Terry  Pre-op Performing Provider: LAZARUS LACKEY      PREOPERATIVE EVALUATION:  Today's date: 12/20/2021    Charu Lerma is a 56 year old female who presents for a preoperative evaluation.    Surgical Information:  Surgery/Procedure: Right neck exploration, excision of parathyroid adenoma  Surgery Location:  OR  Surgeon: Robbin Hobbs MD  Surgery Date: 12/23/2021  Time of Surgery: 7:30 AM  Where patient plans to recover: At home with family  Fax number for surgical facility: Note does not need to be faxed, will be available electronically in Epic.    Type of Anesthesia Anticipated: General    Assessment & Plan     The proposed surgical procedure is considered INTERMEDIATE risk.    Assessment and Plan:     (Z01.818) Preop general physical exam  (primary encounter diagnosis)  Comment: able to do 4 METs, no cardiac history  Plan: Asymptomatic COVID-19 Virus (Coronavirus) by         PCR Nose, EKG 12-lead complete w/read - Clinics  Cleared for surgery    (E21.0) Primary hyperparathyroidism (H)  Comment: following with endo  Plan: procedure above    (E78.5) Hyperlipidemia, unspecified hyperlipidemia type  Comment: on atorvastatin   Plan: atorvastatin (LIPITOR) 10 MG tablet    (E55.9) Vitamin D deficiency  Comment: on vitamin D  Plan: cont vitamin D    (R03.0) Elevated blood pressure reading without diagnosis of hypertension  Comment: BP a little elevated today  Plan: she has a cuff at home, will monitor and record, bring to next visit w/Dr. Terry    Risks and Recommendations:  The patient has the following additional risks and recommendations for perioperative complications:   - Consult Hospitalist / IM to assist with post-op medical management    Medication Instructions:  Stop all NSAIDs (motrin, ibuprofen, naproxen, aleve, advil, naprosyn,  aspirin)  for at least 5 days prior to surgery.    Skip vitamins and supplements the am of surgery.    Take atorvastatin as usual the night before surgery.    Check BP daily at home and record, bring to next visit.    RECOMMENDATION:  APPROVAL GIVEN to proceed with proposed procedure, without further diagnostic evaluation.    Loyda Pires PA-C  30 minutes on the day of the encounter doing chart review, history and exam, documentation and further activities as noted above.          Subjective     HPI related to upcoming procedure:   She will have parathyroid adenoma excision with Dr. Hobbs later this week.    She feels well.    She exercises regularly--she walks daily 2 miles.  She denies chest pain or sob with walking.    She denies any cardiac history.    She denies fever/chills, cough, chest pain, sob, abdominal pain, nausea/vomiting, diarrhea, dysuria, easy bleeding.    She saw me for BRBPR last month but decided not to follow-up with GI as I recommended.  Preop Questions 12/20/2021   1. Have you ever had a heart attack or stroke? No   2. Have you ever had surgery on your heart or blood vessels, such as a stent placement, a coronary artery bypass, or surgery on an artery in your head, neck, heart, or legs? No   3. Do you have chest pain with activity? No   4. Do you have a history of  heart failure? No   5. Do you currently have a cold, bronchitis or symptoms of other infection? No   6. Do you have a cough, shortness of breath, or wheezing? No   7. Do you or anyone in your family have previous history of blood clots? No   8. Do you or does anyone in your family have a serious bleeding problem such as prolonged bleeding following surgeries or cuts? No   9. Have you ever had problems with anemia or been told to take iron pills? YES -    10. Have you had any abnormal blood loss such as black, tarry or bloody stools, or abnormal vaginal bleeding? No   11. Have you ever had a blood transfusion? No   12.  Are you willing to have a blood transfusion if it is medically needed before, during, or after your surgery? Yes   13. Have you or any of your relatives ever had problems with anesthesia? No   14. Do you have sleep apnea, excessive snoring or daytime drowsiness? No   15. Do you have any artifical heart valves or other implanted medical devices like a pacemaker, defibrillator, or continuous glucose monitor? No   16. Do you have artificial joints? No   17. Are you allergic to latex? No   18. Is there any chance that you may be pregnant? No     Health Care Directive:  Patient does not have a Health Care Directive or Living Will: Discussed advance care planning with patient; however, patient declined at this time.        Review of Systems  Constitutional, neuro, ENT, endocrine, pulmonary, cardiac, gastrointestinal, genitourinary, musculoskeletal, integument and psychiatric systems are negative, except as otherwise noted.    Patient Active Problem List    Diagnosis Date Noted     Screening for cervical cancer 10/28/2021     Priority: Medium     Labial lesion 10/28/2021     Priority: Medium     Stress incontinence of urine 09/30/2021     Priority: Medium     Onychomycosis 09/30/2021     Priority: Medium     Annual physical exam 09/30/2021     Priority: Medium     Acute respiratory disease due to COVID-19 virus 05/29/2020     Priority: Medium     Low bone density 04/28/2020     Priority: Medium     Hypercalcemia 10/05/2019     Priority: Medium     Primary hyperparathyroidism (H) 10/05/2019     Priority: Medium     Mild major depression (H) 11/16/2017     Priority: Medium     Vitamin D deficiency 09/28/2016     Priority: Medium     Hyperlipidemia, unspecified hyperlipidemia type 09/15/2016     Priority: Medium     Obesity, unspecified obesity severity, unspecified obesity type 09/15/2016     Priority: Medium      Past Medical History:   Diagnosis Date     Hypercalcemia 2017     Hyperparathyroidism, primary (H)       "Hypovitaminosis D 2016     Low bone density 10/2019     Past Surgical History:   Procedure Laterality Date     TUBAL LIGATION       Current Outpatient Medications   Medication Sig Dispense Refill     acetaminophen (TYLENOL) 325 MG tablet Take 2 tablets (650 mg) by mouth every 4 hours as needed for mild pain 1 Bottle 0     atorvastatin (LIPITOR) 10 MG tablet Take 1 tablet (10 mg) by mouth daily 60 tablet 3     augmented betamethasone dipropionate (DIPROLENE-AF) 0.05 % external cream Apply to AA BID x 2-3 weeks then PRN only. Do not apply to face 50 g 2     GLUCOSAMINE SULFATE PO Take 2 capsules by mouth At Bedtime        terbinafine (LAMISIL) 250 MG tablet 1 tab po daily 60 tablet 0     VITAMIN D, CHOLECALCIFEROL, PO Take 1 tablet by mouth At Bedtime          No Known Allergies     Social History     Tobacco Use     Smoking status: Never Smoker     Smokeless tobacco: Never Used   Substance Use Topics     Alcohol use: No     Family History   Problem Relation Age of Onset     Diabetes Son 25        on insulin     Glaucoma No family hx of      Macular Degeneration No family hx of      Nephrolithiasis No family hx of      Hypercalcemia No family hx of      Thyroid Disease No family hx of      History   Drug Use No         Objective     BP (!) 148/88   Pulse 68   Temp 97.8  F (36.6  C) (Temporal)   Resp 16   Ht 1.6 m (5' 3\")   Wt 68.9 kg (152 lb)   SpO2 98%   Breastfeeding No   BMI 26.93 kg/m      Physical Exam    GENERAL APPEARANCE: healthy, alert and no distress     EYES: no scleral icterus     HENT: OP clear mouth without ulcers or lesions     NECK: supple, no bruits     RESP: lungs clear to auscultation - no rales, rhonchi or wheezes     CV: regular rates and rhythm, normal S1 S2, no S3 or S4 and no murmur, click or rub     ABDOMEN:  soft, nontender, no HSM or masses and bowel sounds normal     MS: extremities normal- no gross deformities noted, no edema     NEURO: Normal mentation, gait and speechnormal     " PSYCH: mentation appears normal. and affect normal/bright      Recent Labs   Lab Test 12/02/21  1045 09/30/21  1046 09/30/21  1045 08/10/20  1213 05/29/20  2355 05/29/20  1030   HGB 13.8 13.6  --  13.9   < > 10.5*    280  --  304   < > 311   INR  --   --   --  0.91  --   --      --  137  --    < > 140   POTASSIUM 4.2  --  4.4  --    < > 3.3*   CR 0.62  --  0.68  --    < > 0.55   A1C  --  5.7*  --   --   --  6.3*    < > = values in this interval not displayed.        Diagnostics:  Labs 12/2/21 reviewed and WNL aside from mildly elevated Calcium of 10.5  EKG: sinus w/rate of 76, no ischemia    Revised Cardiac Risk Index (RCRI):  The patient has the following serious cardiovascular risks for perioperative complications:   - No serious cardiac risks = 0 points     RCRI Interpretation: 0 points: Class I (very low risk - 0.4% complication rate)           Signed Electronically by: Loyda Pires PA-C  Copy of this evaluation report is provided to requesting physician.

## 2021-12-20 NOTE — PATIENT INSTRUCTIONS
Stop all NSAIDs (motrin, ibuprofen, naproxen, aleve, advil, naprosyn, aspirin)  for at least 5 days prior to surgery.    Skip vitamins and supplements the am of surgery.    Take atorvastatin as usual the night before surgery.    Check BP daily at home and record, bring to next visit.

## 2021-12-20 NOTE — PROGRESS NOTES
[address], [address]  [facility] [address]  Phone: [phone]  Primary Provider: Anitha Terry  Pre-op Performing Provider: LAZARUS LACKEY      PREOPERATIVE EVALUATION:  Today's date: 12/20/2021    Charu Lerma is a 56 year old female who presents for a preoperative evaluation.    Surgical Information:  Surgery/Procedure: Right neck exploration, excision of parathyroid adenoma  Surgery Location:  OR  Surgeon: Robbin Hobbs MD  Surgery Date: 12/23/2021  Time of Surgery: 7:30 AM  Where patient plans to recover: At home with family  Fax number for surgical facility: Note does not need to be faxed, will be available electronically in Epic.    Type of Anesthesia Anticipated: General    Assessment & Plan     The proposed surgical procedure is considered INTERMEDIATE risk.    Assessment and Plan:     (Z01.818) Preop general physical exam  (primary encounter diagnosis)  Comment: able to do 4 METs, no cardiac history  Plan: Asymptomatic COVID-19 Virus (Coronavirus) by         PCR Nose, EKG 12-lead complete w/read - Clinics  Cleared for surgery    (E21.0) Primary hyperparathyroidism (H)  Comment: following with endo  Plan: procedure above    (E78.5) Hyperlipidemia, unspecified hyperlipidemia type  Comment: on atorvastatin   Plan: atorvastatin (LIPITOR) 10 MG tablet    (E55.9) Vitamin D deficiency  Comment: on vitamin D  Plan: cont vitamin D    (R03.0) Elevated blood pressure reading without diagnosis of hypertension  Comment: BP a little elevated today  Plan: she has a cuff at home, will monitor and record, bring to next visit w/Dr. Terry    Risks and Recommendations:  The patient has the following additional risks and recommendations for perioperative complications:   - Consult Hospitalist / IM to assist with post-op medical management    Medication Instructions:  Stop all NSAIDs (motrin, ibuprofen, naproxen, aleve, advil, naprosyn,  aspirin)  for at least 5 days prior to surgery.    Skip vitamins and supplements the am of surgery.    Take atorvastatin as usual the night before surgery.    Check BP daily at home and record, bring to next visit.    RECOMMENDATION:  APPROVAL GIVEN to proceed with proposed procedure, without further diagnostic evaluation.    Loyda Pires PA-C  30 minutes on the day of the encounter doing chart review, history and exam, documentation and further activities as noted above.          Subjective     HPI related to upcoming procedure:   She will have parathyroid adenoma excision with Dr. Hobbs later this week.    She feels well.    She exercises regularly--she walks daily 2 miles.  She denies chest pain or sob with walking.    She denies any cardiac history.    She denies fever/chills, cough, chest pain, sob, abdominal pain, nausea/vomiting, diarrhea, dysuria, easy bleeding.    She saw me for BRBPR last month but decided not to follow-up with GI as I recommended.  Preop Questions 12/20/2021   1. Have you ever had a heart attack or stroke? No   2. Have you ever had surgery on your heart or blood vessels, such as a stent placement, a coronary artery bypass, or surgery on an artery in your head, neck, heart, or legs? No   3. Do you have chest pain with activity? No   4. Do you have a history of  heart failure? No   5. Do you currently have a cold, bronchitis or symptoms of other infection? No   6. Do you have a cough, shortness of breath, or wheezing? No   7. Do you or anyone in your family have previous history of blood clots? No   8. Do you or does anyone in your family have a serious bleeding problem such as prolonged bleeding following surgeries or cuts? No   9. Have you ever had problems with anemia or been told to take iron pills? YES -    10. Have you had any abnormal blood loss such as black, tarry or bloody stools, or abnormal vaginal bleeding? No   11. Have you ever had a blood transfusion? No   12.  Are you willing to have a blood transfusion if it is medically needed before, during, or after your surgery? Yes   13. Have you or any of your relatives ever had problems with anesthesia? No   14. Do you have sleep apnea, excessive snoring or daytime drowsiness? No   15. Do you have any artifical heart valves or other implanted medical devices like a pacemaker, defibrillator, or continuous glucose monitor? No   16. Do you have artificial joints? No   17. Are you allergic to latex? No   18. Is there any chance that you may be pregnant? No     Health Care Directive:  Patient does not have a Health Care Directive or Living Will: Discussed advance care planning with patient; however, patient declined at this time.        Review of Systems  Constitutional, neuro, ENT, endocrine, pulmonary, cardiac, gastrointestinal, genitourinary, musculoskeletal, integument and psychiatric systems are negative, except as otherwise noted.    Patient Active Problem List    Diagnosis Date Noted     Screening for cervical cancer 10/28/2021     Priority: Medium     Labial lesion 10/28/2021     Priority: Medium     Stress incontinence of urine 09/30/2021     Priority: Medium     Onychomycosis 09/30/2021     Priority: Medium     Annual physical exam 09/30/2021     Priority: Medium     Acute respiratory disease due to COVID-19 virus 05/29/2020     Priority: Medium     Low bone density 04/28/2020     Priority: Medium     Hypercalcemia 10/05/2019     Priority: Medium     Primary hyperparathyroidism (H) 10/05/2019     Priority: Medium     Mild major depression (H) 11/16/2017     Priority: Medium     Vitamin D deficiency 09/28/2016     Priority: Medium     Hyperlipidemia, unspecified hyperlipidemia type 09/15/2016     Priority: Medium     Obesity, unspecified obesity severity, unspecified obesity type 09/15/2016     Priority: Medium      Past Medical History:   Diagnosis Date     Hypercalcemia 2017     Hyperparathyroidism, primary (H)       "Hypovitaminosis D 2016     Low bone density 10/2019     Past Surgical History:   Procedure Laterality Date     TUBAL LIGATION       Current Outpatient Medications   Medication Sig Dispense Refill     acetaminophen (TYLENOL) 325 MG tablet Take 2 tablets (650 mg) by mouth every 4 hours as needed for mild pain 1 Bottle 0     atorvastatin (LIPITOR) 10 MG tablet Take 1 tablet (10 mg) by mouth daily 60 tablet 3     augmented betamethasone dipropionate (DIPROLENE-AF) 0.05 % external cream Apply to AA BID x 2-3 weeks then PRN only. Do not apply to face 50 g 2     GLUCOSAMINE SULFATE PO Take 2 capsules by mouth At Bedtime        terbinafine (LAMISIL) 250 MG tablet 1 tab po daily 60 tablet 0     VITAMIN D, CHOLECALCIFEROL, PO Take 1 tablet by mouth At Bedtime          No Known Allergies     Social History     Tobacco Use     Smoking status: Never Smoker     Smokeless tobacco: Never Used   Substance Use Topics     Alcohol use: No     Family History   Problem Relation Age of Onset     Diabetes Son 25        on insulin     Glaucoma No family hx of      Macular Degeneration No family hx of      Nephrolithiasis No family hx of      Hypercalcemia No family hx of      Thyroid Disease No family hx of      History   Drug Use No         Objective     BP (!) 148/88   Pulse 68   Temp 97.8  F (36.6  C) (Temporal)   Resp 16   Ht 1.6 m (5' 3\")   Wt 68.9 kg (152 lb)   SpO2 98%   Breastfeeding No   BMI 26.93 kg/m      Physical Exam    GENERAL APPEARANCE: healthy, alert and no distress     EYES: no scleral icterus     HENT: OP clear mouth without ulcers or lesions     NECK: supple, no bruits     RESP: lungs clear to auscultation - no rales, rhonchi or wheezes     CV: regular rates and rhythm, normal S1 S2, no S3 or S4 and no murmur, click or rub     ABDOMEN:  soft, nontender, no HSM or masses and bowel sounds normal     MS: extremities normal- no gross deformities noted, no edema     NEURO: Normal mentation, gait and speechnormal     " PSYCH: mentation appears normal. and affect normal/bright      Recent Labs   Lab Test 12/02/21  1045 09/30/21  1046 09/30/21  1045 08/10/20  1213 05/29/20  2355 05/29/20  1030   HGB 13.8 13.6  --  13.9   < > 10.5*    280  --  304   < > 311   INR  --   --   --  0.91  --   --      --  137  --    < > 140   POTASSIUM 4.2  --  4.4  --    < > 3.3*   CR 0.62  --  0.68  --    < > 0.55   A1C  --  5.7*  --   --   --  6.3*    < > = values in this interval not displayed.        Diagnostics:  Labs 12/2/21 reviewed and WNL aside from mildly elevated Calcium of 10.5  EKG: sinus w/rate of 76, no ischemia    Revised Cardiac Risk Index (RCRI):  The patient has the following serious cardiovascular risks for perioperative complications:   - No serious cardiac risks = 0 points     RCRI Interpretation: 0 points: Class I (very low risk - 0.4% complication rate)           Signed Electronically by: Loyda Pires PA-C  Copy of this evaluation report is provided to requesting physician.       Statement Selected

## 2021-12-21 LAB — SARS-COV-2 RNA RESP QL NAA+PROBE: NEGATIVE

## 2021-12-23 ENCOUNTER — APPOINTMENT (OUTPATIENT)
Dept: SURGERY | Facility: PHYSICIAN GROUP | Age: 56
End: 2021-12-23
Payer: COMMERCIAL

## 2021-12-23 ENCOUNTER — HOSPITAL ENCOUNTER (OUTPATIENT)
Facility: CLINIC | Age: 56
Discharge: HOME | End: 2021-12-23
Attending: SURGERY | Admitting: SURGERY
Payer: COMMERCIAL

## 2021-12-23 ENCOUNTER — ANESTHESIA EVENT (OUTPATIENT)
Dept: SURGERY | Facility: CLINIC | Age: 56
End: 2021-12-23
Payer: COMMERCIAL

## 2021-12-23 ENCOUNTER — ANESTHESIA (OUTPATIENT)
Dept: SURGERY | Facility: CLINIC | Age: 56
End: 2021-12-23
Payer: COMMERCIAL

## 2021-12-23 VITALS
DIASTOLIC BLOOD PRESSURE: 73 MMHG | WEIGHT: 153.5 LBS | BODY MASS INDEX: 25.58 KG/M2 | RESPIRATION RATE: 16 BRPM | HEART RATE: 71 BPM | TEMPERATURE: 97 F | SYSTOLIC BLOOD PRESSURE: 136 MMHG | HEIGHT: 65 IN | OXYGEN SATURATION: 96 %

## 2021-12-23 DIAGNOSIS — E21.0 PRIMARY HYPERPARATHYROIDISM (H): ICD-10-CM

## 2021-12-23 DIAGNOSIS — G89.18 ACUTE POST-OPERATIVE PAIN: Primary | ICD-10-CM

## 2021-12-23 LAB
PTH-INTACT SERPL-MCNC: 173 PG/ML
PTH-INTACT SERPL-MCNC: 30 PG/ML
PTH-INTACT SERPL-MCNC: 47 PG/ML

## 2021-12-23 PROCEDURE — 36415 COLL VENOUS BLD VENIPUNCTURE: CPT | Performed by: SURGERY

## 2021-12-23 PROCEDURE — 250N000009 HC RX 250: Performed by: SURGERY

## 2021-12-23 PROCEDURE — 258N000003 HC RX IP 258 OP 636: Performed by: NURSE ANESTHETIST, CERTIFIED REGISTERED

## 2021-12-23 PROCEDURE — 710N000012 HC RECOVERY PHASE 2, PER MINUTE: Performed by: SURGERY

## 2021-12-23 PROCEDURE — 360N000077 HC SURGERY LEVEL 4, PER MIN: Performed by: SURGERY

## 2021-12-23 PROCEDURE — 250N000011 HC RX IP 250 OP 636: Performed by: NURSE ANESTHETIST, CERTIFIED REGISTERED

## 2021-12-23 PROCEDURE — 999N000141 HC STATISTIC PRE-PROCEDURE NURSING ASSESSMENT: Performed by: SURGERY

## 2021-12-23 PROCEDURE — 60500 EXPLORE PARATHYROID GLANDS: CPT | Mod: AS | Performed by: PHYSICIAN ASSISTANT

## 2021-12-23 PROCEDURE — 710N000009 HC RECOVERY PHASE 1, LEVEL 1, PER MIN: Performed by: SURGERY

## 2021-12-23 PROCEDURE — 250N000013 HC RX MED GY IP 250 OP 250 PS 637: Performed by: PHYSICIAN ASSISTANT

## 2021-12-23 PROCEDURE — 88331 PATH CONSLTJ SURG 1 BLK 1SPC: CPT | Mod: TC | Performed by: SURGERY

## 2021-12-23 PROCEDURE — 83970 ASSAY OF PARATHORMONE: CPT | Performed by: SURGERY

## 2021-12-23 PROCEDURE — 250N000025 HC SEVOFLURANE, PER MIN: Performed by: SURGERY

## 2021-12-23 PROCEDURE — 250N000009 HC RX 250: Performed by: NURSE ANESTHETIST, CERTIFIED REGISTERED

## 2021-12-23 PROCEDURE — 272N000001 HC OR GENERAL SUPPLY STERILE: Performed by: SURGERY

## 2021-12-23 PROCEDURE — 250N000011 HC RX IP 250 OP 636: Performed by: SURGERY

## 2021-12-23 PROCEDURE — 370N000017 HC ANESTHESIA TECHNICAL FEE, PER MIN: Performed by: SURGERY

## 2021-12-23 PROCEDURE — 60500 EXPLORE PARATHYROID GLANDS: CPT | Performed by: SURGERY

## 2021-12-23 RX ORDER — AMOXICILLIN 250 MG
1-2 CAPSULE ORAL 2 TIMES DAILY
Qty: 15 TABLET | Refills: 0 | Status: SHIPPED | OUTPATIENT
Start: 2021-12-23 | End: 2022-01-11

## 2021-12-23 RX ORDER — HYDROMORPHONE HCL IN WATER/PF 6 MG/30 ML
0.4 PATIENT CONTROLLED ANALGESIA SYRINGE INTRAVENOUS EVERY 5 MIN PRN
Status: DISCONTINUED | OUTPATIENT
Start: 2021-12-23 | End: 2021-12-23 | Stop reason: HOSPADM

## 2021-12-23 RX ORDER — MEPERIDINE HYDROCHLORIDE 25 MG/ML
12.5 INJECTION INTRAMUSCULAR; INTRAVENOUS; SUBCUTANEOUS
Status: DISCONTINUED | OUTPATIENT
Start: 2021-12-23 | End: 2021-12-23 | Stop reason: HOSPADM

## 2021-12-23 RX ORDER — DEXAMETHASONE SODIUM PHOSPHATE 4 MG/ML
INJECTION, SOLUTION INTRA-ARTICULAR; INTRALESIONAL; INTRAMUSCULAR; INTRAVENOUS; SOFT TISSUE PRN
Status: DISCONTINUED | OUTPATIENT
Start: 2021-12-23 | End: 2021-12-23

## 2021-12-23 RX ORDER — HYDROCODONE BITARTRATE AND ACETAMINOPHEN 5; 325 MG/1; MG/1
1 TABLET ORAL
Status: COMPLETED | OUTPATIENT
Start: 2021-12-23 | End: 2021-12-23

## 2021-12-23 RX ORDER — HYDROCODONE BITARTRATE AND ACETAMINOPHEN 5; 325 MG/1; MG/1
1 TABLET ORAL EVERY 4 HOURS PRN
Qty: 12 TABLET | Refills: 0 | Status: SHIPPED | OUTPATIENT
Start: 2021-12-23 | End: 2022-01-11

## 2021-12-23 RX ORDER — CEFAZOLIN SODIUM 2 G/100ML
2 INJECTION, SOLUTION INTRAVENOUS SEE ADMIN INSTRUCTIONS
Status: DISCONTINUED | OUTPATIENT
Start: 2021-12-23 | End: 2021-12-23 | Stop reason: HOSPADM

## 2021-12-23 RX ORDER — PROPOFOL 10 MG/ML
INJECTION, EMULSION INTRAVENOUS PRN
Status: DISCONTINUED | OUTPATIENT
Start: 2021-12-23 | End: 2021-12-23

## 2021-12-23 RX ORDER — ONDANSETRON 2 MG/ML
4 INJECTION INTRAMUSCULAR; INTRAVENOUS EVERY 30 MIN PRN
Status: DISCONTINUED | OUTPATIENT
Start: 2021-12-23 | End: 2021-12-23 | Stop reason: HOSPADM

## 2021-12-23 RX ORDER — EPINEPHRINE 1 MG/ML
INJECTION, SOLUTION INTRAMUSCULAR; SUBCUTANEOUS
Status: DISCONTINUED
Start: 2021-12-23 | End: 2021-12-23 | Stop reason: HOSPADM

## 2021-12-23 RX ORDER — FENTANYL CITRATE 50 UG/ML
INJECTION, SOLUTION INTRAMUSCULAR; INTRAVENOUS PRN
Status: DISCONTINUED | OUTPATIENT
Start: 2021-12-23 | End: 2021-12-23

## 2021-12-23 RX ORDER — SODIUM CHLORIDE, SODIUM LACTATE, POTASSIUM CHLORIDE, CALCIUM CHLORIDE 600; 310; 30; 20 MG/100ML; MG/100ML; MG/100ML; MG/100ML
INJECTION, SOLUTION INTRAVENOUS CONTINUOUS
Status: DISCONTINUED | OUTPATIENT
Start: 2021-12-23 | End: 2021-12-23 | Stop reason: HOSPADM

## 2021-12-23 RX ORDER — SODIUM CHLORIDE, SODIUM LACTATE, POTASSIUM CHLORIDE, CALCIUM CHLORIDE 600; 310; 30; 20 MG/100ML; MG/100ML; MG/100ML; MG/100ML
INJECTION, SOLUTION INTRAVENOUS CONTINUOUS PRN
Status: DISCONTINUED | OUTPATIENT
Start: 2021-12-23 | End: 2021-12-23

## 2021-12-23 RX ORDER — ONDANSETRON 4 MG/1
4 TABLET, ORALLY DISINTEGRATING ORAL EVERY 30 MIN PRN
Status: DISCONTINUED | OUTPATIENT
Start: 2021-12-23 | End: 2021-12-23 | Stop reason: HOSPADM

## 2021-12-23 RX ORDER — FENTANYL CITRATE 0.05 MG/ML
25 INJECTION, SOLUTION INTRAMUSCULAR; INTRAVENOUS
Status: CANCELLED | OUTPATIENT
Start: 2021-12-23

## 2021-12-23 RX ORDER — LIDOCAINE HYDROCHLORIDE 20 MG/ML
INJECTION, SOLUTION INFILTRATION; PERINEURAL PRN
Status: DISCONTINUED | OUTPATIENT
Start: 2021-12-23 | End: 2021-12-23

## 2021-12-23 RX ORDER — ONDANSETRON 2 MG/ML
INJECTION INTRAMUSCULAR; INTRAVENOUS PRN
Status: DISCONTINUED | OUTPATIENT
Start: 2021-12-23 | End: 2021-12-23

## 2021-12-23 RX ORDER — OXYCODONE HYDROCHLORIDE 5 MG/1
5 TABLET ORAL EVERY 4 HOURS PRN
Status: DISCONTINUED | OUTPATIENT
Start: 2021-12-23 | End: 2021-12-23 | Stop reason: HOSPADM

## 2021-12-23 RX ORDER — CEFAZOLIN SODIUM 2 G/100ML
2 INJECTION, SOLUTION INTRAVENOUS
Status: COMPLETED | OUTPATIENT
Start: 2021-12-23 | End: 2021-12-23

## 2021-12-23 RX ORDER — BUPIVACAINE HYDROCHLORIDE AND EPINEPHRINE 2.5; 5 MG/ML; UG/ML
INJECTION, SOLUTION INFILTRATION; PERINEURAL PRN
Status: DISCONTINUED | OUTPATIENT
Start: 2021-12-23 | End: 2021-12-23 | Stop reason: HOSPADM

## 2021-12-23 RX ORDER — CALCIUM CARBONATE 500(1250)
1 TABLET ORAL 2 TIMES DAILY
Qty: 60 TABLET | Refills: 0 | Status: SHIPPED | OUTPATIENT
Start: 2021-12-23 | End: 2022-04-27

## 2021-12-23 RX ORDER — BUPIVACAINE HYDROCHLORIDE 2.5 MG/ML
INJECTION, SOLUTION EPIDURAL; INFILTRATION; INTRACAUDAL
Status: DISCONTINUED
Start: 2021-12-23 | End: 2021-12-23 | Stop reason: HOSPADM

## 2021-12-23 RX ORDER — PROPOFOL 10 MG/ML
INJECTION, EMULSION INTRAVENOUS CONTINUOUS PRN
Status: DISCONTINUED | OUTPATIENT
Start: 2021-12-23 | End: 2021-12-23

## 2021-12-23 RX ORDER — FENTANYL CITRATE 0.05 MG/ML
50 INJECTION, SOLUTION INTRAMUSCULAR; INTRAVENOUS EVERY 5 MIN PRN
Status: DISCONTINUED | OUTPATIENT
Start: 2021-12-23 | End: 2021-12-23 | Stop reason: HOSPADM

## 2021-12-23 RX ADMIN — FENTANYL CITRATE 50 MCG: 50 INJECTION, SOLUTION INTRAMUSCULAR; INTRAVENOUS at 07:40

## 2021-12-23 RX ADMIN — PROPOFOL 200 MG: 10 INJECTION, EMULSION INTRAVENOUS at 07:40

## 2021-12-23 RX ADMIN — ROCURONIUM BROMIDE 5 MG: 50 INJECTION, SOLUTION INTRAVENOUS at 07:40

## 2021-12-23 RX ADMIN — FENTANYL CITRATE 50 MCG: 50 INJECTION, SOLUTION INTRAMUSCULAR; INTRAVENOUS at 09:36

## 2021-12-23 RX ADMIN — SUCCINYLCHOLINE CHLORIDE 100 MG: 20 INJECTION, SOLUTION INTRAMUSCULAR; INTRAVENOUS; PARENTERAL at 07:40

## 2021-12-23 RX ADMIN — REMIFENTANIL HYDROCHLORIDE 0.2 MCG/KG/MIN: 1 INJECTION, POWDER, LYOPHILIZED, FOR SOLUTION INTRAVENOUS at 07:43

## 2021-12-23 RX ADMIN — MIDAZOLAM 2 MG: 1 INJECTION INTRAMUSCULAR; INTRAVENOUS at 07:32

## 2021-12-23 RX ADMIN — CEFAZOLIN SODIUM 2 G: 2 INJECTION, SOLUTION INTRAVENOUS at 07:36

## 2021-12-23 RX ADMIN — HYDROCODONE BITARTRATE AND ACETAMINOPHEN 1 TABLET: 5; 325 TABLET ORAL at 10:50

## 2021-12-23 RX ADMIN — PHENYLEPHRINE HYDROCHLORIDE 100 MCG: 10 INJECTION INTRAVENOUS at 09:11

## 2021-12-23 RX ADMIN — PROPOFOL 50 MCG/KG/MIN: 10 INJECTION, EMULSION INTRAVENOUS at 07:45

## 2021-12-23 RX ADMIN — PHENYLEPHRINE HYDROCHLORIDE 200 MCG: 10 INJECTION INTRAVENOUS at 07:52

## 2021-12-23 RX ADMIN — ONDANSETRON 4 MG: 2 INJECTION INTRAMUSCULAR; INTRAVENOUS at 09:34

## 2021-12-23 RX ADMIN — PHENYLEPHRINE HYDROCHLORIDE 50 MCG: 10 INJECTION INTRAVENOUS at 08:02

## 2021-12-23 RX ADMIN — LIDOCAINE HYDROCHLORIDE 80 MG: 20 INJECTION, SOLUTION INFILTRATION; PERINEURAL at 07:40

## 2021-12-23 RX ADMIN — SODIUM CHLORIDE, POTASSIUM CHLORIDE, SODIUM LACTATE AND CALCIUM CHLORIDE: 600; 310; 30; 20 INJECTION, SOLUTION INTRAVENOUS at 07:36

## 2021-12-23 RX ADMIN — DEXAMETHASONE SODIUM PHOSPHATE 4 MG: 4 INJECTION, SOLUTION INTRA-ARTICULAR; INTRALESIONAL; INTRAMUSCULAR; INTRAVENOUS; SOFT TISSUE at 07:55

## 2021-12-23 RX ADMIN — PHENYLEPHRINE HYDROCHLORIDE 0.5 MCG/KG/MIN: 10 INJECTION INTRAVENOUS at 08:02

## 2021-12-23 ASSESSMENT — ENCOUNTER SYMPTOMS
SEIZURES: 0
DYSRHYTHMIAS: 0

## 2021-12-23 ASSESSMENT — LIFESTYLE VARIABLES: TOBACCO_USE: 0

## 2021-12-23 ASSESSMENT — MIFFLIN-ST. JEOR: SCORE: 1277.15

## 2021-12-23 NOTE — DISCHARGE INSTRUCTIONS
Same Day Surgery Discharge Instructions for  Sedation and General Anesthesia       It's not unusual to feel dizzy, light-headed or faint for up to 24 hours after surgery or while taking pain medication.  If you have these symptoms: sit for a few minutes before standing and have someone assist you when you get up to walk or use the bathroom.      You should rest and relax for the next 24 hours. We recommend you make arrangements to have an adult stay with you for at least 24 hours after your discharge.  Avoid hazardous and strenuous activity.      DO NOT DRIVE any vehicle or operate mechanical equipment for 24 hours following the end of your surgery.  Even though you may feel normal, your reactions may be affected by the medication you have received.      Do not drink alcoholic beverages for 24 hours following surgery.       Slowly progress to your regular diet as you feel able. It's not unusual to feel nauseated and/or vomit after receiving anesthesia.  If you develop these symptoms, drink clear liquids (apple juice, ginger ale, broth, 7-up, etc. ) until you feel better.  If your nausea and vomiting persists for 24 hours, please notify your surgeon.        All narcotic pain medications, along with inactivity and anesthesia, can cause constipation. Drinking plenty of liquids and increasing fiber intake will help.      For any questions of a medical nature, call your surgeon.      Do not make important decisions for 24 hours.      If you had general anesthesia, you may have a sore throat for a couple of days related to the breathing tube used during surgery.  You may use Cepacol lozenges to help with this discomfort.  If it worsens or if you develop a fever, contact your surgeon.       If you feel your pain is not well managed with the pain medications prescribed by your surgeon, please contact your surgeon's office to let them know so they can address your concerns.       CoVid 19 Information    We want to give you  information regarding Covid. Please consult your primary care provider with any questions you might have.     Patient who have symptoms (cough, fever, or shortness of breath), need to isolate for 7 days from when symptoms started OR 72 hours after fever resolves (without fever reducing medications) AND improvement of respiratory symptoms (whichever is longer).      Isolate yourself at home (in own room/own bathroom if possible)    Do Not allow any visitors    Do Not go to work or school    Do Not go to [facility],  centers, shopping, or other public places.    Do Not shake hands.    Avoid close and intimate contact with others (hugging, kissing).    Follow CDC recommendations for household cleaning of frequently touched services.     After the initial 7 days, continue to isolate yourself from household members as much as possible. To continue decrease the risk of community spread and exposure, you and any members of your household should limit activities in public for 14 days after starting home isolation.     You can reference the following CDC link for helpful home isolation/care tips:  https://www.cdc.gov/coronavirus/2019-ncov/downloads/10Things.pdf    Protect Others:    Cover Your Mouth and Nose with a mask, disposable tissue or wash cloth to avoid spreading germs to others.    Wash your hands and face frequently with soap and water    Call Your Primary Doctor If: Breathing difficulty develops or you become worse.    For more information about COVID19 and options for caring for yourself at home, please visit the CDC website at https://www.cdc.gov/coronavirus/2019-ncov/about/steps-when-sick.html  For more options for care at [facility], please visit our website at [email or web address]      [facility]  Discharge Instructions: Post-Operative Parathyroid Surgery    ACTIVITY    Take frequent, short walks and increase your activity gradually.       Avoid strenuous physical activity or heavy lifting greater than 15-20 lbs. for 1-2 weeks.  You may climb stairs.    You may drive without restrictions when you are not using any prescription pain medication and feel comfortable in a car.    You may return to work/school when you are comfortable without any prescription pain medication.    WOUND CARE    You may remove your bandage and shower 48 hours after the surgery.  Pat your incision dry and leave it open to air.  Re-apply dressing (Band-Aids or gauze/tape) as needed for comfort or drainage.    You may have steri-strips (looks like white tape) on your incision.  You may peel off the steri-strips 2 weeks after your surgery if they have not peeled off on their own.     Do not soak your incision in a tub or pool for 2 weeks.     Do not apply any lotions, creams, or ointments to your incision.    A ridge under your incision is normal and will gradually resolve.    DIET    Start with liquids, then gradually resume your regular diet as tolerated.      Drink plenty of fluids to stay hydrated.    PAIN    Expect some tenderness and discomfort at the incision site(s).  Use the prescribed pain medication at your discretion.  Expect gradual resolution of your pain over several days.    You may take ibuprofen with food (unless you have been told not to) or acetaminophen/Tylenol instead of or in addition to your prescribed pain medication.  However, if you are taking Norco or Percocet, do not take any additional acetaminophen/Tylenol.    Do not drink alcohol or drive while you are taking pain medications.    You may apply ice to your incisions in 20 minute intervals as needed for the next 48 hours.  After that time, consider switching to heat if you prefer.    Watch for symptoms of numbness or tingling around the mouth or in the fingers or toes.  This may be a sign of a low calcium level.  Please contact the office so we can evaluate your symptoms.  You may need to have  your blood calcium level checked by doing a simple blood test.    EXPECTATIONS    Pain medications can cause constipation.  Limit use when possible.  Take an over the counter or prescribed stool softener/stimulant, such as Colace or Senna, 1-2 times a day with plenty of water.  You may take a mild over the counter laxative, such as Miralax or a suppository, as needed.      You may discontinue these medications once you are having regular bowel movements and/or are no longer taking your narcotic pain medication.    RETURN APPOINTMENT    Follow up with your surgeon in 2 weeks.  Please call our office at [phone] to schedule your appointment.  We are located at [address] [place] [address].    CALL OUR OFFICE -[phone] IF YOU HAVE:     Chills or fever above 101 F.    Increased redness, warmth, or drainage at your incisions.    Significant bleeding.    Pain not relieved by your pain medication or rest.    Increasing pain after the first 48 hours.    Any other concerns or questions.           Revised December 2021

## 2021-12-23 NOTE — BRIEF OP NOTE
[facility]  General Surgery Brief Operative Note    Pre-operative diagnosis: Primary hyperparathyroidism (H) [E21.0]   Post-operative diagnosis Same   Procedure: Procedure(s):  Right neck exploration, excision of parathyroid adenoma    Surgeon(s), Assistant(s): Surgeon(s) and Role:     * Robbin Hobbs MD - Primary     * Rekha Doll PA-C - Assisting   Estimated blood loss:  Minimal <5mL   Drains: None   Specimens: ID Type Source Tests Collected by Time Destination   1 : RIGHT SUPERIOR PARATHYROID GLAND Tissue Other SURGICAL PATHOLOGY EXAM Robbin Hobbs MD 12/23/2021  8:41 AM    A : PTHI  Blood Line, venous PARATHYROID HORMONE INTACT Robbin Blanco APRN CRNA 12/23/2021  9:02 AM    B : PTHI #2 Blood Line, Other PARATHYROID HORMONE INTACT Robbin Hobbs MD 12/23/2021  7:34 AM       Findings: Right superior parathyroid adenoma.   Complications:  Condition: None  Stable   Comments:      Rekha Doll PA-C  [facility]         See dictated operative report for full details

## 2021-12-23 NOTE — RESULT ENCOUNTER NOTE
Dear Charu,     Here are your recent results: covid test negative.    Please let us know if you have any questions or concerns.    Regards,  Loyda Pires PA-C

## 2021-12-23 NOTE — OP NOTE
General Surgery Operative Note    PREOPERATIVE DIAGNOSIS:   Primary hyperparathyroidism.    POSTOPERATIVE DIAGNOSIS:   Primary hyperparathyroidism.    PROCEDURE:   Excision of right superior parathyroid adenoma, focused neck exploration.    ANESTHESIA:  General.    PREOPERATIVE MEDICATIONS:  Ancef IV.    SURGEON:  Robbin Hobbs MD, MD    ASSISTANT: Rekha Doll PA-C.  First assistant was necessary due to challenging exposure and the need for improved visualization and help maintaining hemostasis.      ESTIMATED BLOOD LOSS: 5  cc's    INDICATIONS:  Charu Lerma is a 56 year old female who has primary hyperparathyroidism. She has had symptoms of difficulty concentrating.  She has been found to have an elevated calcium of greater than 11.  She has a localizing CT scan in the right middle neck.  She presents today for focused neck exploration, excision of parathyroid adenoma.  Her PTH preoperatively is 170.    DESCRIPTION OF PROCEDURE:  The patient was placed supine, head and neck in extension and a bump between the scapulae.  Transverse cervical neck creases had been marked in the preinduction area and the one most suitable was utilized for exposure.  Incision was made, superior and inferior skin flaps raised.  Midline fascia opened and reflected to the right.  An abnormal parathyroid was identified at the  right superior location.  It was very deep and adjacent to the esophagus.  We encountered the recurrent laryngeal nerve on that side and it had a normal signal with the nerve monitor throughout the case.  The parathyroid gland was meticulously dissected from the surrounding tissue and submitted for frozen section which confirmed a 1.6 gram hypercellular parathyroid.  The site was inspected for hemostasis.  The PTH assays were sent at 15 and 25 minutes post-excision and the first value came back at 45, signifying the completeness of the dissection.  The patient's incision site was then closed  with running 3-0 Vicryl for the midline fascia, interrupted for platysma and interrupted 4-0 subcuticular Monocryl for skin.  Marcaine 0.25% plain was instilled and the patient transferred to recovery in good condition.    INTRAOPERATIVE FINDINGS:  1.  A 1.6 gram hypercellular right superior parathyroid correlated nicely with sestamibi scan.  2.  PTH assay diminished from 170 to 45 at 15 minutes post excision.  3.  Grossly normal thyroid.    Specimens:   ID Type Source Tests Collected by Time Destination   1 : RIGHT SUPERIOR PARATHYROID GLAND Tissue Other SURGICAL PATHOLOGY EXAM Robbin Hobbs MD 12/23/2021  8:41 AM    A : PTHI  Blood Line, venous PARATHYROID HORMONE INTACT Robbin Blanco APRN CRNA 12/23/2021  9:02 AM    B : PTHI #2 Blood Line, Other PARATHYROID HORMONE INTACT Robbin Hobbs MD 12/23/2021  7:34 AM        Robbin Hobbs MD, MD

## 2021-12-23 NOTE — ANESTHESIA CARE TRANSFER NOTE
Patient: Charu Lerma    Procedure: Procedure(s):  Right neck exploration, excision of parathyroid adenoma       Diagnosis: Primary hyperparathyroidism (H) [E21.0]  Diagnosis Additional Information: No value filed.    Anesthesia Type:   General     Note:    Oropharynx: oropharynx clear of all foreign objects and spontaneously breathing  Level of Consciousness: awake  Oxygen Supplementation: face mask  Level of Supplemental Oxygen (L/min / FiO2): 6  Independent Airway: airway patency satisfactory and stable  Dentition: dentition unchanged  Vital Signs Stable: post-procedure vital signs reviewed and stable  Report to RN Given: handoff report given  Patient transferred to: PACU  Comments: Neuromuscular blockade not used after succinylcholine for intubation, spontaneous return of TOF 4/4 with sustained tetany, spontaneous respirations, adequate tidal volumes, followed commands to voice, oropharynx suctioned with soft flexible catheter, extubated atraumatically, extubated with suction, airway patent after extubation.  Oxygen via facemask at 6 liters per minute to PACU. Oxygen tubing connected to wall O2 in PACU, SpO2, NiBP, and EKG monitors and alarms on and functioning, Humphrey Hugger warmer connected to patient gown, report on patient's clinical status given to PACU RN, RN questions answered.     Handoff Report: Identifed the Patient, Identified the Reponsible Provider, Reviewed the pertinent medical history, Discussed the surgical course, Reviewed Intra-OP anesthesia mangement and issues during anesthesia, Set expectations for post-procedure period and Allowed opportunity for questions and acknowledgement of understanding      Vitals:  Vitals Value Taken Time   /87 12/23/21 0948   Temp     Pulse 67 12/23/21 0948   Resp 12 12/23/21 0950   SpO2 100 % 12/23/21 0950   Vitals shown include unvalidated device data.    Electronically Signed By: MARIA E Bauer CRNA  December 23, 2021  9:51  AM

## 2021-12-23 NOTE — ANESTHESIA PREPROCEDURE EVALUATION
Anesthesia Pre-Procedure Evaluation    Patient: Charu Lerma   MRN: [id number] : 1965        Preoperative Diagnosis: Primary hyperparathyroidism (H) [E21.0]    Procedure : Procedure(s):  Right neck exploration, excision of parathyroid adenoma          Past Medical History:   Diagnosis Date     Hypercalcemia 2017     Hyperparathyroidism, primary (H)      Hypovitaminosis D 2016     Low bone density 10/2019      Past Surgical History:   Procedure Laterality Date     TUBAL LIGATION        No Known Allergies   Social History     Tobacco Use     Smoking status: Never Smoker     Smokeless tobacco: Never Used   Substance Use Topics     Alcohol use: No      Wt Readings from Last 1 Encounters:   21 69.6 kg (153 lb 8 oz)        Anesthesia Evaluation   Pt has had prior anesthetic. Type: General.    No history of anesthetic complications       ROS/MED HX  ENT/Pulmonary:    (-) tobacco use, asthma and sleep apnea   Neurologic:     (+) no peripheral neuropathy  (-) no seizures and no CVA   Cardiovascular:     (+) Dyslipidemia ----- (-) hypertension, CAD and arrhythmias   METS/Exercise Tolerance:     Hematologic:       Musculoskeletal:       GI/Hepatic:    (-) GERD   Renal/Genitourinary:    (-) renal disease   Endo: Comment: hyperparathyroid   (-) Type II DM and thyroid disease   Psychiatric/Substance Use:       Infectious Disease:    (-) Recent Fever   Malignancy:       Other:            Physical Exam    Airway  airway exam normal      Mallampati: III   TM distance: > 3 FB   Neck ROM: full   Mouth opening: > 3 cm    Respiratory Devices and Support         Dental  no notable dental history         Cardiovascular   cardiovascular exam normal          Pulmonary   pulmonary exam normal                OUTSIDE LABS:  CBC:   Lab Results   Component Value Date    WBC 8.3 2021    WBC 7.8 2021    HGB 13.8 2021    HGB 13.6 2021    HCT 41.8 2021    HCT 40.1 2021      12/02/2021     09/30/2021     BMP:   Lab Results   Component Value Date     12/02/2021     09/30/2021    POTASSIUM 4.2 12/02/2021    POTASSIUM 4.4 09/30/2021    CHLORIDE 106 12/02/2021    CHLORIDE 106 09/30/2021    CO2 27 12/02/2021    CO2 28 09/30/2021    BUN 10 12/02/2021    BUN 14 09/30/2021    CR 0.62 12/02/2021    CR 0.68 09/30/2021    GLC 91 12/02/2021    GLC 91 09/30/2021     COAGS:   Lab Results   Component Value Date    INR 0.91 08/10/2020     POC:   Lab Results   Component Value Date    BGM 86 05/30/2020    HCGS Negative 11/24/2017     HEPATIC:   Lab Results   Component Value Date    ALBUMIN 4.0 12/02/2021    PROTTOTAL 8.3 12/02/2021    ALT 29 12/02/2021    AST 16 12/02/2021    GGT 31 07/18/2019    ALKPHOS 152 (H) 12/02/2021    BILITOTAL 0.4 12/02/2021     OTHER:   Lab Results   Component Value Date    LACT 0.9 05/28/2020    A1C 5.7 (H) 09/30/2021    KIERA 10.5 (H) 12/02/2021    PHOS 2.7 10/19/2021    MAG 2.3 05/28/2020    TSH 3.35 09/06/2020    .0 (H) 05/28/2020    SED 25 11/24/2017       Anesthesia Plan    ASA Status:  2   NPO Status:  NPO Appropriate    Anesthesia Type: General.     - Airway: ETT   Induction: Intravenous.   Maintenance: Balanced.   Techniques and Equipment:     - Airway: Video-Laryngoscope       - Drips/Meds: Remifentanil     Consents    Anesthesia Plan(s) and associated risks, benefits, and realistic alternatives discussed. Questions answered and patient/representative(s) expressed understanding.    - Discussed:     - Discussed with:  Patient         Postoperative Care    Pain management: IV analgesics, Oral pain medications.   PONV prophylaxis: Ondansetron (or other 5HT-3), Dexamethasone or Solumedrol, Background Propofol Infusion     Comments:                Jovanni Cobian MD

## 2021-12-23 NOTE — ANESTHESIA POSTPROCEDURE EVALUATION
Patient: Charu Lerma    Procedure: Procedure(s):  Right neck exploration, excision of parathyroid adenoma       Diagnosis:Primary hyperparathyroidism (H) [E21.0]  Diagnosis Additional Information: No value filed.    Anesthesia Type:  General    Note:  Disposition: Outpatient   Postop Pain Control: Uneventful            Sign Out: Well controlled pain   PONV: No   Neuro/Psych: Uneventful            Sign Out: Acceptable/Baseline neuro status   Airway/Respiratory: Uneventful            Sign Out: Acceptable/Baseline resp. status   CV/Hemodynamics: Uneventful            Sign Out: Acceptable CV status   Other NRE: NONE   DID A NON-ROUTINE EVENT OCCUR? No           Last vitals:  Vitals Value Taken Time   /76 12/23/21 1045   Temp 36.2  C (97.2  F) 12/23/21 1045   Pulse 79 12/23/21 1053   Resp 13 12/23/21 1053   SpO2 99 % 12/23/21 1053   Vitals shown include unvalidated device data.    Electronically Signed By: Jovanni Cobian MD  December 23, 2021  1:50 PM

## 2021-12-23 NOTE — ANESTHESIA PROCEDURE NOTES
Airway       Patient location during procedure: OR ([facility] - Operating Room or Procedural Area)       Procedure Start/Stop Times: 12/23/2021 7:43 AM  Staff -        Anesthesiologist:  Jovanni Cobian MD       CRNA: Robbin Blanco APRN CRNA       Performed By: CRNAIndications and Patient Condition       Indications for airway management: jacquelin-procedural       Induction type:intravenous       Mask difficulty assessment: 1 - vent by mask    Final Airway Details       Final airway type: endotracheal airway       Successful airway: ETT - single and NIM  Endotracheal Airway Details        ETT size (mm): 7.0       Cuffed: yes       Cuff volume (mL): 10       Successful intubation technique: video laryngoscopy       VL Blade Size: Glidescope 3       Grade View of Cords: 1       Adjucts: stylet       Position: Left       Measured from: gums/teeth       Secured at (cm): 22       Bite block used: None    Post intubation assessment        Number of attempts at approach: 1       Number of other approaches attempted: 0       Secured with: pink tape       Ease of procedure: easy       Dentition: Intact and Unchanged

## 2021-12-24 LAB
PATH REPORT.COMMENTS IMP SPEC: NORMAL
PATH REPORT.COMMENTS IMP SPEC: NORMAL
PATH REPORT.FINAL DX SPEC: NORMAL
PATH REPORT.GROSS SPEC: NORMAL
PATH REPORT.INTRAOP OBS SPEC DOC: NORMAL
PATH REPORT.MICROSCOPIC SPEC OTHER STN: NORMAL
PATH REPORT.RELEVANT HX SPEC: NORMAL
PHOTO IMAGE: NORMAL

## 2021-12-24 PROCEDURE — 88331 PATH CONSLTJ SURG 1 BLK 1SPC: CPT | Mod: 26 | Performed by: PATHOLOGY

## 2021-12-24 PROCEDURE — 88305 TISSUE EXAM BY PATHOLOGIST: CPT | Mod: 26 | Performed by: PATHOLOGY

## 2021-12-27 ENCOUNTER — THERAPY VISIT (OUTPATIENT)
Dept: PHYSICAL THERAPY | Facility: CLINIC | Age: 56
End: 2021-12-27
Payer: COMMERCIAL

## 2021-12-27 DIAGNOSIS — N39.41 URGE INCONTINENCE OF URINE: ICD-10-CM

## 2021-12-27 DIAGNOSIS — N39.3 STRESS INCONTINENCE OF URINE: Primary | ICD-10-CM

## 2021-12-27 PROCEDURE — 97110 THERAPEUTIC EXERCISES: CPT | Mod: GP | Performed by: PHYSICAL THERAPIST

## 2021-12-27 PROCEDURE — 97530 THERAPEUTIC ACTIVITIES: CPT | Mod: GP | Performed by: PHYSICAL THERAPIST

## 2022-01-03 ENCOUNTER — TELEPHONE (OUTPATIENT)
Dept: SURGERY | Facility: CLINIC | Age: 57
End: 2022-01-03
Payer: COMMERCIAL

## 2022-01-03 NOTE — TELEPHONE ENCOUNTER
Name of caller: Patient    Reason for Call:  Patient has PO questions and would like to speak with a nurse.    Surgeon:  Dr. Hobbs     Recent Surgery:  Yes.    If yes, when & what type:  12/23/21    Right neck exploration, excision of parathyroid adenoma      Best phone number to reach pt at is: [phone]    Ok to leave a message with medical info? Yes.

## 2022-01-03 NOTE — TELEPHONE ENCOUNTER
Procedure:  Excision of right superior parathyroid adenoma, focused neck exploration    Date: 12/23/2021    Surgeon:  Anjel    Patient has her post operative appointment scheduled with Dr. Hobbs for January 11th. She is wondering if she has to come in prior to that appointment to have steri strips removed    Informed her that she can remove them on her own after 2 weeks post operatively    If she feels that they are too adherent for her to remove on her own, she can leave them in place until her visit with Dr. Hobbs and he will remove them during the post op appointment    She is in agreement and will call PRKAITLIN Jackson, RN-BSN

## 2022-01-07 ENCOUNTER — THERAPY VISIT (OUTPATIENT)
Dept: PHYSICAL THERAPY | Facility: CLINIC | Age: 57
End: 2022-01-07
Payer: COMMERCIAL

## 2022-01-07 DIAGNOSIS — N39.41 URGE INCONTINENCE OF URINE: ICD-10-CM

## 2022-01-07 DIAGNOSIS — N39.3 STRESS INCONTINENCE OF URINE: Primary | ICD-10-CM

## 2022-01-07 PROCEDURE — 97112 NEUROMUSCULAR REEDUCATION: CPT | Mod: GP | Performed by: PHYSICAL THERAPIST

## 2022-01-07 PROCEDURE — 97530 THERAPEUTIC ACTIVITIES: CPT | Mod: GP | Performed by: PHYSICAL THERAPIST

## 2022-01-10 ENCOUNTER — TELEPHONE (OUTPATIENT)
Dept: GASTROENTEROLOGY | Facility: CLINIC | Age: 57
End: 2022-01-10
Payer: COMMERCIAL

## 2022-01-10 ENCOUNTER — HOSPITAL ENCOUNTER (OUTPATIENT)
Facility: CLINIC | Age: 57
End: 2022-01-10
Attending: COLON & RECTAL SURGERY | Admitting: COLON & RECTAL SURGERY
Payer: COMMERCIAL

## 2022-01-10 NOTE — TELEPHONE ENCOUNTER
Screening Questions  1. Are you active on mychart? Y    2. What insurance is in the chart? [employer]     2.  Ordering/Referring Provider: Guillermina Pires    3. BMI 26.9, If greater than 40 review exclusion criteria also will need EXTENDED PREP    4.  Respiratory Screening (If yes to any of the following HOSPITAL setting only):     Do you use daily home oxygen? N  Do you have mod to severe Obstructive Sleep Apnea? N   Do you have Pulmonary Hypertension? N   Do you have UNCONTROLLED asthma? N    5. Have you had a heart or lung transplant? N  (If yes, please review exclusion criteria)    6. Are you currently on dialysis?N  (If yes, schedule in HOSPITAL setting only)(If yes, please send Golytely prep)    7. Do you have chronic kidney disease? N (If yes, please send Golytely prep)    7. Have you had a stroke or Transient ischemic attack (TIA) within 6 months? N (If yes, do not schedule at [facility])    8. In the past 6 months, have you had any heart related issues including cardiomyopathy or heart attack? N (If yes, please review exclusion criteria)           If yes, did it require cardiac stenting or other implantable device?N  (If yes, please review exclusion criteria)      9. Do you have any implantable devices in your body (pacemaker, defib, LVAD)? N (If yes, schedule at [facility])    10. Do you take nitroglycerin? If yes, how often? N (if yes, schedule at HOSPITAL setting)    11. Are you currently taking any blood thinners?N (If yes- inform patient to follow up with PCP or provider for follow up instructions)     12. Are you a diabetic? N (If yes, please send Golytely prep)    13. (Females) Are you currently pregnant? NA  If yes, how many weeks?      15. Are you taking any prescription pain medications on a routine schedule? N If yes, MAC sedation and patient will need EXTENDED PREP.    16. Do you have any chemical dependencies such as alcohol, street drugs, or methadone? N If yes, MAC sedation and patient will need EXTENDED  PREP    17. Do you have any history of post-traumatic stress syndrome, severe anxiety or history of psychosis? N  If yes, MAC sedation.     18. Do you transfer independently? Yes    19.  Do you have any issues with constipation? N   If yes, pt will need EXTENDED PREP     20. Preferred Pharmacy for Pre Prescription [id number] - [place], [place] - [address] AT [address] [place] [address]    Scheduling Details    Which Colonoscopy Prep was Sent?: Miralax  Type of Procedure Scheduled: Colon  Surgeon: Gin  Date of Procedure: 1/31/22  Location:   Caller (Please ask for phone number if not scheduled by patient): [facility]      Sedation Type: CS  Conscious Sedation- Needs  for 6 hours after the procedure  MAC/General-Needs  for 24 hours after procedure    Pre-op Required at [facility] [place] [place] and OR for MAC sedation: NA  (if yes advise patient they will need a pre-op prior to procedure)      Informed patient they will need an adult  Yes  Cannot take any type of public or medical transportation alone    Pre-Procedure Covid test to be completed at [facility] or Externally: [place] 1/27/22    Confirmed Nurse will call to complete assessment Yes    Additional comments:  ([facility] NEED EXTENDED PREP)

## 2022-01-11 ENCOUNTER — OFFICE VISIT (OUTPATIENT)
Dept: SURGERY | Facility: CLINIC | Age: 57
End: 2022-01-11
Payer: COMMERCIAL

## 2022-01-11 DIAGNOSIS — Z09 FOLLOW-UP EXAM: Primary | ICD-10-CM

## 2022-01-11 PROCEDURE — 99024 POSTOP FOLLOW-UP VISIT: CPT | Performed by: SURGERY

## 2022-01-11 NOTE — PROGRESS NOTES
Surgery Postop Note    Charu Lerma presents today for surgical followup.  she is doing well following right-sided neck exploration and parathyroidectomy.  Incisions look fine with no signs of wound infection.  We removed a right superior parathyroid gland which weighed 1600 mg.  Her PTH dropped from 170 preop to 30 postop.  She has no signs of hypocalcemia and I have decreased the amount of her supplemental calcium.  I expect her to make a complete recovery.  Thank you for the opportunity to help in her care.    Orlando Hobbs M.D.  [facility]  [phone]    Please route or send letter to:  Primary Care Provider (PCP) and Referring Provider

## 2022-01-14 ENCOUNTER — THERAPY VISIT (OUTPATIENT)
Dept: PHYSICAL THERAPY | Facility: CLINIC | Age: 57
End: 2022-01-14
Payer: COMMERCIAL

## 2022-01-14 DIAGNOSIS — N39.3 STRESS INCONTINENCE OF URINE: Primary | ICD-10-CM

## 2022-01-14 DIAGNOSIS — N39.41 URGE INCONTINENCE OF URINE: ICD-10-CM

## 2022-01-14 PROCEDURE — 97140 MANUAL THERAPY 1/> REGIONS: CPT | Mod: GP | Performed by: PHYSICAL THERAPIST

## 2022-01-14 PROCEDURE — 97112 NEUROMUSCULAR REEDUCATION: CPT | Mod: GP | Performed by: PHYSICAL THERAPIST

## 2022-01-14 PROCEDURE — 97530 THERAPEUTIC ACTIVITIES: CPT | Mod: GP | Performed by: PHYSICAL THERAPIST

## 2022-01-18 DIAGNOSIS — Z11.59 ENCOUNTER FOR SCREENING FOR OTHER VIRAL DISEASES: Primary | ICD-10-CM

## 2022-01-19 ENCOUNTER — LAB (OUTPATIENT)
Dept: LAB | Facility: CLINIC | Age: 57
End: 2022-01-19

## 2022-01-19 ENCOUNTER — IMMUNIZATION (OUTPATIENT)
Dept: NURSING | Facility: CLINIC | Age: 57
End: 2022-01-19
Payer: COMMERCIAL

## 2022-01-19 ENCOUNTER — OFFICE VISIT (OUTPATIENT)
Dept: ENDOCRINOLOGY | Facility: CLINIC | Age: 57
End: 2022-01-19
Payer: COMMERCIAL

## 2022-01-19 VITALS
DIASTOLIC BLOOD PRESSURE: 78 MMHG | HEIGHT: 65 IN | HEART RATE: 71 BPM | WEIGHT: 160 LBS | BODY MASS INDEX: 26.66 KG/M2 | SYSTOLIC BLOOD PRESSURE: 123 MMHG

## 2022-01-19 DIAGNOSIS — E21.0 PRIMARY HYPERPARATHYROIDISM (H): Primary | ICD-10-CM

## 2022-01-19 DIAGNOSIS — E21.0 PRIMARY HYPERPARATHYROIDISM (H): ICD-10-CM

## 2022-01-19 LAB
ALBUMIN SERPL-MCNC: 3.5 G/DL (ref 3.4–5)
ANION GAP SERPL CALCULATED.3IONS-SCNC: 5 MMOL/L (ref 3–14)
BUN SERPL-MCNC: 16 MG/DL (ref 7–30)
CALCIUM SERPL-MCNC: 8.8 MG/DL (ref 8.5–10.1)
CHLORIDE BLD-SCNC: 108 MMOL/L (ref 94–109)
CO2 SERPL-SCNC: 26 MMOL/L (ref 20–32)
CREAT SERPL-MCNC: 0.64 MG/DL (ref 0.52–1.04)
GFR SERPL CREATININE-BSD FRML MDRD: >90 ML/MIN/1.73M2
GLUCOSE BLD-MCNC: 107 MG/DL (ref 70–99)
POTASSIUM BLD-SCNC: 4.3 MMOL/L (ref 3.4–5.3)
PTH-INTACT SERPL-MCNC: 97 PG/ML (ref 18–80)
SODIUM SERPL-SCNC: 139 MMOL/L (ref 133–144)

## 2022-01-19 PROCEDURE — 99213 OFFICE O/P EST LOW 20 MIN: CPT | Performed by: INTERNAL MEDICINE

## 2022-01-19 PROCEDURE — 0054A COVID-19,PF,PFIZER (12+ YRS): CPT

## 2022-01-19 PROCEDURE — 36415 COLL VENOUS BLD VENIPUNCTURE: CPT

## 2022-01-19 PROCEDURE — 83970 ASSAY OF PARATHORMONE: CPT

## 2022-01-19 PROCEDURE — 82040 ASSAY OF SERUM ALBUMIN: CPT

## 2022-01-19 PROCEDURE — 80048 BASIC METABOLIC PNL TOTAL CA: CPT

## 2022-01-19 PROCEDURE — 91305 COVID-19,PF,PFIZER (12+ YRS): CPT

## 2022-01-19 ASSESSMENT — MIFFLIN-ST. JEOR: SCORE: 1306.64

## 2022-01-19 NOTE — PROGRESS NOTES
Charu Lerma is a 56 year old yo female who presents today for follow-up of primary hyperparathyroidism. She had surgical resection on 12/23 of R superior adenoma.    Subjective:  Charu Lerma is a 56 year old female   Chief Complaint   Patient presents with     Primary hyperparathyroidism       1) Primary Hyperparathyroidism  HPI in brief: Hypercalcemia noted since 2017 and was referred to Dr Longo in 2020 who completed evaluation consistent with primary hyperparathyroidism. DEXA (no wrist) revealed T score of -2.1 at lowest and was recommended to see a surgeon at that time. Saw me 10/22 for further evaluation. At the time, had PTH level of 194 and calcium of 11.3 (ULN 10.1). She completed parathyroidectomy with Dr JOSIANE Hobbs on 12/23 with excision of R superior parathyroid gland. PTH dropped from 170 to 30 intraop.     INTERVAL HISTORY:  - Overall doing really well postoperatively, no concerns  - Denies perioral numbness, distal paraesthesias, muscle cramping, diarrhea/constipation  - Currently taking calcium carbonate once daily, down from twice daily.         Active diagnoses this visit:  Primary hyperparathyroidism (H)     ROS: 10 point ROS neg other than the symptoms noted above in the HPI.      Medical, surgical, social, and family histories, medications and allergies reviewed and updated.  Past Medical History:   Diagnosis Date     Hypercalcemia 2017     Hyperparathyroidism, primary (H)      Hypovitaminosis D 2016     Low bone density 10/2019       Past Surgical History:   Procedure Laterality Date     PARATHYROIDECTOMY Right 12/23/2021    Procedure: Right neck exploration, excision of parathyroid adenoma;  Surgeon: Robbin Hobbs MD;  Location: [facility]     TUBAL LIGATION         Allergies:  Patient has no known allergies.    Social History     Tobacco Use     Smoking status: Never Smoker     Smokeless tobacco: Never Used   Substance Use Topics     Alcohol use: No  "      Family History   Problem Relation Age of Onset     Diabetes Son 25        on insulin     Glaucoma No family hx of      Macular Degeneration No family hx of      Nephrolithiasis No family hx of      Hypercalcemia No family hx of      Thyroid Disease No family hx of      Objective:  /78   Pulse 71   Ht 1.651 m (5' 5\")   Wt 72.6 kg (160 lb)   BMI 26.63 kg/m      Exam:  Constitutional: healthy, alert, no acute distress  Head: Normocephalic. No masses, lesions, no exophthalmos/proptosis  ENT: normal thyroid, no palpable nodules, no cervical lymph nodes, small surgical scar well healed  Respiratory: nonlabored  Gastrointestinal: Abdomen soft, non-tender.  Musculoskeletal: extremities normal- no gross deformities noted, gait normal and normal muscle tone  Skin: no suspicious lesions or rashes  Neurologic: Gait normal. sensation grossly intact  Psychiatric: mentation appears normal, calm      Lab Results   Component Value Date/Time    TSH 3.35 09/06/2020 11:06 AM    PTHI 47 12/23/2021 09:02 AM    PTHI 206 (H) 07/18/2019 08:34 AM     Last Comprehensive Metabolic Panel:  Sodium   Date Value Ref Range Status   12/02/2021 137 133 - 144 mmol/L Final   05/30/2020 136 133 - 144 mmol/L Final     Potassium   Date Value Ref Range Status   12/02/2021 4.2 3.4 - 5.3 mmol/L Final   05/30/2020 3.6 3.4 - 5.3 mmol/L Final     Chloride   Date Value Ref Range Status   12/02/2021 106 94 - 109 mmol/L Final   05/30/2020 105 94 - 109 mmol/L Final     Carbon Dioxide   Date Value Ref Range Status   05/30/2020 24 20 - 32 mmol/L Final     Carbon Dioxide (CO2)   Date Value Ref Range Status   12/02/2021 27 20 - 32 mmol/L Final     Anion Gap   Date Value Ref Range Status   12/02/2021 4 3 - 14 mmol/L Final   05/30/2020 7 3 - 14 mmol/L Final     Glucose   Date Value Ref Range Status   12/02/2021 91 70 - 99 mg/dL Final   05/30/2020 147 (H) 70 - 99 mg/dL Final     Urea Nitrogen   Date Value Ref Range Status   12/02/2021 10 7 - 30 mg/dL Final "   05/30/2020 9 7 - 30 mg/dL Final     Creatinine   Date Value Ref Range Status   12/02/2021 0.62 0.52 - 1.04 mg/dL Final   05/30/2020 0.65 0.52 - 1.04 mg/dL Final     GFR Estimate   Date Value Ref Range Status   12/02/2021 >90 >60 mL/min/1.73m2 Final     Comment:     As of July 11, 2021, eGFR is calculated by the CKD-EPI creatinine equation, without race adjustment. eGFR can be influenced by muscle mass, exercise, and diet. The reported eGFR is an estimation only and is only applicable if the renal function is stable.   05/30/2020 >90 >60 mL/min/[1.73_m2] Final     Comment:     Non  GFR Calc  Starting 12/18/2018, serum creatinine based estimated GFR (eGFR) will be   calculated using the Chronic Kidney Disease Epidemiology Collaboration   (CKD-EPI) equation.       Calcium   Date Value Ref Range Status   12/02/2021 10.5 (H) 8.5 - 10.1 mg/dL Final   05/30/2020 10.8 (H) 8.5 - 10.1 mg/dL Final     12/23 surgical path: Right superior parathyroid gland: Excision:  - Hypercellular parathyroid gland, consistent with parathyroid adenoma (1671 mg)      ASSESSMENT / PLAN:      1) Primary Hyperparathyroidism, s/p resection 12/23/2021  - Given dramatic improvement in PTH level, likely surgical cure. No s/s of hypocalcemia  - Will recheck PTH, Calcium at this time to ensure levels steady  - Will complete remaining RX for calcium (approx 20 pills) and then stop calcium supplementation.  - Reviewed unlikely to return and anticipate all sequelae to resolve.     Return to clinic: PRN    A total of 16 minutes were spent today 01/19/22 on this visit including chart review, history and counseling, documentation and other activities as detailed above.

## 2022-01-19 NOTE — LETTER
1/19/2022         RE: Charu Lerma  [address] [place] [address]        Dear Colleague,    Thank you for referring your patient, Charu Lerma, to the [facility]. Please see a copy of my visit note below.    Charu Lerma is a 56 year old yo female who presents today for follow-up of primary hyperparathyroidism. She had surgical resection on 12/23 of R superior adenoma.    Subjective:  Charu Lerma is a 56 year old female   Chief Complaint   Patient presents with     Primary hyperparathyroidism       1) Primary Hyperparathyroidism  HPI in brief: Hypercalcemia noted since 2017 and was referred to Dr Longo in 2020 who completed evaluation consistent with primary hyperparathyroidism. DEXA (no wrist) revealed T score of -2.1 at lowest and was recommended to see a surgeon at that time. Saw me 10/22 for further evaluation. At the time, had PTH level of 194 and calcium of 11.3 (ULN 10.1). She completed parathyroidectomy with Dr JOSIANE Hobbs on 12/23 with excision of R superior parathyroid gland. PTH dropped from 170 to 30 intraop.     INTERVAL HISTORY:  - Overall doing really well postoperatively, no concerns  - Denies perioral numbness, distal paraesthesias, muscle cramping, diarrhea/constipation  - Currently taking calcium carbonate once daily, down from twice daily.         Active diagnoses this visit:  Primary hyperparathyroidism (H)     ROS: 10 point ROS neg other than the symptoms noted above in the HPI.      Medical, surgical, social, and family histories, medications and allergies reviewed and updated.  Past Medical History:   Diagnosis Date     Hypercalcemia 2017     Hyperparathyroidism, primary (H)      Hypovitaminosis D 2016     Low bone density 10/2019       Past Surgical History:   Procedure Laterality Date     PARATHYROIDECTOMY Right 12/23/2021    Procedure: Right neck exploration, excision of  "parathyroid adenoma;  Surgeon: Robbin Hobbs MD;  Location: [facility]     TUBAL LIGATION         Allergies:  Patient has no known allergies.    Social History     Tobacco Use     Smoking status: Never Smoker     Smokeless tobacco: Never Used   Substance Use Topics     Alcohol use: No       Family History   Problem Relation Age of Onset     Diabetes Son 25        on insulin     Glaucoma No family hx of      Macular Degeneration No family hx of      Nephrolithiasis No family hx of      Hypercalcemia No family hx of      Thyroid Disease No family hx of      Objective:  /78   Pulse 71   Ht 1.651 m (5' 5\")   Wt 72.6 kg (160 lb)   BMI 26.63 kg/m      Exam:  Constitutional: healthy, alert, no acute distress  Head: Normocephalic. No masses, lesions, no exophthalmos/proptosis  ENT: normal thyroid, no palpable nodules, no cervical lymph nodes, small surgical scar well healed  Respiratory: nonlabored  Gastrointestinal: Abdomen soft, non-tender.  Musculoskeletal: extremities normal- no gross deformities noted, gait normal and normal muscle tone  Skin: no suspicious lesions or rashes  Neurologic: Gait normal. sensation grossly intact  Psychiatric: mentation appears normal, calm      Lab Results   Component Value Date/Time    TSH 3.35 09/06/2020 11:06 AM    PTHI 47 12/23/2021 09:02 AM    PTHI 206 (H) 07/18/2019 08:34 AM     Last Comprehensive Metabolic Panel:  Sodium   Date Value Ref Range Status   12/02/2021 137 133 - 144 mmol/L Final   05/30/2020 136 133 - 144 mmol/L Final     Potassium   Date Value Ref Range Status   12/02/2021 4.2 3.4 - 5.3 mmol/L Final   05/30/2020 3.6 3.4 - 5.3 mmol/L Final     Chloride   Date Value Ref Range Status   12/02/2021 106 94 - 109 mmol/L Final   05/30/2020 105 94 - 109 mmol/L Final     Carbon Dioxide   Date Value Ref Range Status   05/30/2020 24 20 - 32 mmol/L Final     Carbon Dioxide (CO2)   Date Value Ref Range Status   12/02/2021 27 20 - 32 mmol/L Final     Anion Gap   Date " Value Ref Range Status   12/02/2021 4 3 - 14 mmol/L Final   05/30/2020 7 3 - 14 mmol/L Final     Glucose   Date Value Ref Range Status   12/02/2021 91 70 - 99 mg/dL Final   05/30/2020 147 (H) 70 - 99 mg/dL Final     Urea Nitrogen   Date Value Ref Range Status   12/02/2021 10 7 - 30 mg/dL Final   05/30/2020 9 7 - 30 mg/dL Final     Creatinine   Date Value Ref Range Status   12/02/2021 0.62 0.52 - 1.04 mg/dL Final   05/30/2020 0.65 0.52 - 1.04 mg/dL Final     GFR Estimate   Date Value Ref Range Status   12/02/2021 >90 >60 mL/min/1.73m2 Final     Comment:     As of July 11, 2021, eGFR is calculated by the CKD-EPI creatinine equation, without race adjustment. eGFR can be influenced by muscle mass, exercise, and diet. The reported eGFR is an estimation only and is only applicable if the renal function is stable.   05/30/2020 >90 >60 mL/min/[1.73_m2] Final     Comment:     Non  GFR Calc  Starting 12/18/2018, serum creatinine based estimated GFR (eGFR) will be   calculated using the Chronic Kidney Disease Epidemiology Collaboration   (CKD-EPI) equation.       Calcium   Date Value Ref Range Status   12/02/2021 10.5 (H) 8.5 - 10.1 mg/dL Final   05/30/2020 10.8 (H) 8.5 - 10.1 mg/dL Final     12/23 surgical path: Right superior parathyroid gland: Excision:  - Hypercellular parathyroid gland, consistent with parathyroid adenoma (1671 mg)      ASSESSMENT / PLAN:      1) Primary Hyperparathyroidism, s/p resection 12/23/2021  - Given dramatic improvement in PTH level, likely surgical cure. No s/s of hypocalcemia  - Will recheck PTH, Calcium at this time to ensure levels steady  - Will complete remaining RX for calcium (approx 20 pills) and then stop calcium supplementation.  - Reviewed unlikely to return and anticipate all sequelae to resolve.     Return to clinic: PRN    A total of 16 minutes were spent today 01/19/22 on this visit including chart review, history and counseling, documentation and other activities  as detailed above.         Again, thank you for allowing me to participate in the care of your patient.        Sincerely,        Jena Kim MD

## 2022-01-20 DIAGNOSIS — E21.0 PRIMARY HYPERPARATHYROIDISM (H): Primary | ICD-10-CM

## 2022-01-24 ENCOUNTER — TELEPHONE (OUTPATIENT)
Dept: GASTROENTEROLOGY | Facility: CLINIC | Age: 57
End: 2022-01-24
Payer: COMMERCIAL

## 2022-01-24 NOTE — TELEPHONE ENCOUNTER
Caller: Charu Lerma     Procedure: Colon    Date, Location, and Surgeon of Procedure Cancelled: 1/31/2022, [facility]    Ordering Provider:Guillermina Pires    Reason for cancel (please be detailed, any staff messages or encounters to note?): Pt is in [place]        Rescheduled: Yes     If rescheduled:    Date: 5/25/2022   Location: [place]   Note any change or update to original order/sedation: no

## 2022-04-09 ENCOUNTER — OFFICE VISIT (OUTPATIENT)
Dept: URGENT CARE | Facility: URGENT CARE | Age: 57
End: 2022-04-09
Payer: COMMERCIAL

## 2022-04-09 VITALS
DIASTOLIC BLOOD PRESSURE: 70 MMHG | SYSTOLIC BLOOD PRESSURE: 131 MMHG | HEART RATE: 88 BPM | TEMPERATURE: 98.4 F | OXYGEN SATURATION: 99 % | WEIGHT: 160 LBS | BODY MASS INDEX: 26.63 KG/M2

## 2022-04-09 DIAGNOSIS — B37.31 YEAST INFECTION OF THE VAGINA: ICD-10-CM

## 2022-04-09 DIAGNOSIS — N30.90 BLADDER INFECTION: Primary | ICD-10-CM

## 2022-04-09 LAB
ALBUMIN UR-MCNC: NEGATIVE MG/DL
APPEARANCE UR: CLEAR
BACTERIA #/AREA URNS HPF: ABNORMAL /HPF
BILIRUB UR QL STRIP: NEGATIVE
CLUE CELLS: ABNORMAL
COLOR UR AUTO: YELLOW
GLUCOSE UR STRIP-MCNC: NEGATIVE MG/DL
HGB UR QL STRIP: ABNORMAL
KETONES UR STRIP-MCNC: NEGATIVE MG/DL
LEUKOCYTE ESTERASE UR QL STRIP: ABNORMAL
NITRATE UR QL: POSITIVE
PH UR STRIP: 6 [PH] (ref 5–7)
RBC #/AREA URNS AUTO: ABNORMAL /HPF
SP GR UR STRIP: <=1.005 (ref 1–1.03)
SQUAMOUS #/AREA URNS AUTO: ABNORMAL /LPF
TRICHOMONAS, WET PREP: ABNORMAL
UROBILINOGEN UR STRIP-ACNC: 0.2 E.U./DL
WBC #/AREA URNS AUTO: ABNORMAL /HPF
WBC'S/HIGH POWER FIELD, WET PREP: ABNORMAL
YEAST, WET PREP: PRESENT

## 2022-04-09 PROCEDURE — 87086 URINE CULTURE/COLONY COUNT: CPT | Performed by: FAMILY MEDICINE

## 2022-04-09 PROCEDURE — 87210 SMEAR WET MOUNT SALINE/INK: CPT

## 2022-04-09 PROCEDURE — 87186 SC STD MICRODIL/AGAR DIL: CPT | Performed by: FAMILY MEDICINE

## 2022-04-09 PROCEDURE — 81001 URINALYSIS AUTO W/SCOPE: CPT

## 2022-04-09 PROCEDURE — 99213 OFFICE O/P EST LOW 20 MIN: CPT | Performed by: FAMILY MEDICINE

## 2022-04-09 PROCEDURE — 87088 URINE BACTERIA CULTURE: CPT | Performed by: FAMILY MEDICINE

## 2022-04-09 RX ORDER — FLUCONAZOLE 150 MG/1
150 TABLET ORAL ONCE
Qty: 1 TABLET | Refills: 0 | Status: SHIPPED | OUTPATIENT
Start: 2022-04-09 | End: 2022-04-09

## 2022-04-09 RX ORDER — SULFAMETHOXAZOLE/TRIMETHOPRIM 800-160 MG
1 TABLET ORAL 2 TIMES DAILY
Qty: 10 TABLET | Refills: 0 | Status: SHIPPED | OUTPATIENT
Start: 2022-04-09 | End: 2022-04-14

## 2022-04-10 NOTE — PROGRESS NOTES
Subjective: For 5 days of burning and frequency with urination.  She used to have bladder infections a lot when she was sexually active.  She has maybe a little vaginal irritation but no significant discharge.  No fever.  She gets low back pain when this happens.  She wondered about having a blood test to see if her kidney function was okay and we looked and it turns out she had those just a few months ago.    Objective: No CVAT.  Abdomen is benign.  Urine is positive including nitrites.  Culture sent.  She also had some yeast on the vaginal prep.    Assessment and plan: UTI, Septra for 5 days.  Also Diflucan as she will likely have a yeast infection flare due to the antibiotics.

## 2022-04-11 LAB — BACTERIA UR CULT: ABNORMAL

## 2022-04-27 ENCOUNTER — VIRTUAL VISIT (OUTPATIENT)
Dept: INTERNAL MEDICINE | Facility: CLINIC | Age: 57
End: 2022-04-27
Payer: COMMERCIAL

## 2022-04-27 ENCOUNTER — LAB (OUTPATIENT)
Dept: URGENT CARE | Facility: URGENT CARE | Age: 57
End: 2022-04-27
Attending: INTERNAL MEDICINE

## 2022-04-27 DIAGNOSIS — Z20.822 CLOSE EXPOSURE TO COVID-19 VIRUS: Primary | ICD-10-CM

## 2022-04-27 DIAGNOSIS — Z98.890 S/P SUBTOTAL PARATHYROIDECTOMY: ICD-10-CM

## 2022-04-27 DIAGNOSIS — E55.9 VITAMIN D DEFICIENCY: ICD-10-CM

## 2022-04-27 DIAGNOSIS — Z90.89 S/P SUBTOTAL PARATHYROIDECTOMY: ICD-10-CM

## 2022-04-27 DIAGNOSIS — Z20.822 CLOSE EXPOSURE TO COVID-19 VIRUS: ICD-10-CM

## 2022-04-27 PROBLEM — D35.1 PARATHYROID ADENOMA: Status: ACTIVE | Noted: 2021-12-23

## 2022-04-27 PROBLEM — D35.1 PARATHYROID ADENOMA: Status: ACTIVE | Noted: 2022-04-27

## 2022-04-27 PROCEDURE — U0003 INFECTIOUS AGENT DETECTION BY NUCLEIC ACID (DNA OR RNA); SEVERE ACUTE RESPIRATORY SYNDROME CORONAVIRUS 2 (SARS-COV-2) (CORONAVIRUS DISEASE [COVID-19]), AMPLIFIED PROBE TECHNIQUE, MAKING USE OF HIGH THROUGHPUT TECHNOLOGIES AS DESCRIBED BY CMS-2020-01-R: HCPCS

## 2022-04-27 PROCEDURE — 99213 OFFICE O/P EST LOW 20 MIN: CPT | Mod: 95 | Performed by: INTERNAL MEDICINE

## 2022-04-27 PROCEDURE — U0005 INFEC AGEN DETEC AMPLI PROBE: HCPCS

## 2022-04-27 RX ORDER — MULTIVIT-MIN/IRON/FOLIC ACID/K 18-600-40
2000 CAPSULE ORAL AT BEDTIME
Qty: 100 CAPSULE | Refills: 3 | Status: SHIPPED | OUTPATIENT
Start: 2022-04-27 | End: 2023-04-27

## 2022-04-27 NOTE — PROGRESS NOTES
Charu is a 56 year old female being evaluated via a billable phone visit, and would like to be contacted via the following  Cell number on file:    Telephone Information:   Mobile [phone]       ASSESSMENT and PLAN:  1. Close exposure to COVID-19 virus  Minimally symptomatic with slight headache and sore throat but positive exposure a few days ago.  Okay to test  - Asymptomatic COVID-19 Virus (Coronavirus) by PCR Nose; Future    2. Vitamin D deficiency  With history of hyperparathyroidism and parathyroid removal in December  - Vitamin D, Cholecalciferol, 50 MCG (2000 UT) CAPS; Take 2,000 Units by mouth At Bedtime  Dispense: 100 capsule; Refill: 3     3.  Hyperparathyroidism.  Status post removal of parathyroid adenoma December 23, 2021.  Continue vitamin D.  Needs labs and bone density followed closely    Preventive Care Assessed: Otherwise up-to-date with mammograms and colonoscopy     Patient Instructions   COVID swab    Clarify and refill vitamin D 2000 units daily    Health maintenance reviewed and up-to-date     Medication list carefully reviewed and corrected  Epic Merger Problem list addressed and updated     Return in about 1 year (around 4/27/2023) for using a video visit.    CHIEF COMPLAINT:  Chief Complaint   Patient presents with     Covid Concern       HISTORY OF PRESENT ILLNESS:  Charu is a 56 year old female contacting the clinic today via phone for request for COVID testing.  She was exposed to her family a few days ago.  She has slight symptoms of headache and sore throat.  She is vaccinated, and had COVID previously and requests testing    She developed hyperparathyroidism and underwent excision of parathyroid adenoma December 23, 2021.  Parathyroid hormone levels are ordered.  Bone density will be followed.  She does take vitamin D.  Chart review shows previously she had been on approximately 1000 units daily, and had been bolused in 2016.  I recommend 2000 units daily and a new  "prescription will be sent    REVIEW OF SYSTEMS:  Otherwise feels well    PFSH:  Social History     Social History Narrative     , 3 grandkids, 2 children one boy , one girl, boy , single, works as        TOBACCO USE:  History   Smoking Status     Never Smoker   Smokeless Tobacco     Never Used       VITALS:  There were no vitals filed for this visit.  There were no vitals taken for this visit. Estimated body mass index is 26.63 kg/m  as calculated from the following:    Height as of 1/19/22: 1.651 m (5' 5\").    Weight as of 4/9/22: 72.6 kg (160 lb).    PHYSICAL EXAM:  (observations via Phone)  Alert and oriented.  No cough.  [employer]    MEDICATIONS  Current Outpatient Medications   Medication Sig Dispense Refill     Vitamin D, Cholecalciferol, 50 MCG (2000 UT) CAPS Take 2,000 Units by mouth At Bedtime 100 capsule 3       Notes summarized:   Labs, x-rays, cardiology, GI tests reviewed: Parathyroid adenoma surgery and vitamin D levels  Recent Labs   Lab Test 01/19/22  0816 12/02/21  1045 09/30/21  1046 09/30/21  1045 09/06/20  1106 05/29/20  2355 05/29/20  1030   HGB  --  13.8 13.6  --   --    < > 10.5*    137  --    < >  --    < > 140   POTASSIUM 4.3 4.2  --    < >  --    < > 3.3*   CR 0.64 0.62  --    < >  --    < > 0.55   A1C  --   --  5.7*  --   --   --  6.3*   TSH  --   --   --   --  3.35  --   --    VITDT  --  29  --   --  34  --   --     < > = values in this interval not displayed.     Lab Results   Component Value Date    DEBMG96NGD Negative 12/20/2021    KFAEH01UCT Negative 12/17/2021    UGBQL49URQ Not Detected 06/10/2020    YDTCX45RNR Detected, Abnormal Result 05/19/2020     Lab Results   Component Value Date    CHOL 184 12/02/2021    CHOL 227 07/02/2019     New orders:   Orders Placed This Encounter   Procedures     Asymptomatic COVID-19 Virus (Coronavirus) by PCR Nose       Independent review of:  Supplemental history by:      Patient would like to receive their AVS by " Skip    [facility]    Jean-Claude Pulido MD  [facility]    Phone Start Time: 10:11 AM  Phone End time:  10:29 AM  Conversation plus orders: 18 minutes  Dictation time:  3 minutes    The visit lasted a total of 19 minutes

## 2022-04-27 NOTE — PATIENT INSTRUCTIONS
COVID swab    Clarify and refill vitamin D 2000 units daily    Health maintenance reviewed and up-to-date

## 2022-04-28 LAB — SARS-COV-2 RNA RESP QL NAA+PROBE: NEGATIVE

## 2022-05-14 ENCOUNTER — HEALTH MAINTENANCE LETTER (OUTPATIENT)
Age: 57
End: 2022-05-14

## 2022-06-30 ENCOUNTER — TELEPHONE (OUTPATIENT)
Dept: FAMILY MEDICINE | Facility: CLINIC | Age: 57
End: 2022-06-30

## 2022-06-30 DIAGNOSIS — R73.9 BLOOD GLUCOSE ELEVATED: Primary | ICD-10-CM

## 2022-06-30 NOTE — TELEPHONE ENCOUNTER
Appointments in Next Year    Jul 06, 2022  4:00 PM  (Arrive by 3:45 PM)  ED/Hospital Follow Up with Anitha Terry MD  [facility] ([facility] ) [phone]        Pt called - is scheduled to follow up from surgery in December (Dr Robbin Hobbs) - states she was to follow up with PCP in 5-6 months. Asked if she was to follow up with surgeon but pt thought she was to see PCP     Wants pre-visit labs checked, A1C and glucose she wants checked along with any other labs PCP orders    Please advise - pt requesting pre-visit labs    Azucena Luna RN  [facility]

## 2022-07-06 ENCOUNTER — OFFICE VISIT (OUTPATIENT)
Dept: FAMILY MEDICINE | Facility: CLINIC | Age: 57
End: 2022-07-06
Payer: COMMERCIAL

## 2022-07-06 VITALS
SYSTOLIC BLOOD PRESSURE: 160 MMHG | RESPIRATION RATE: 16 BRPM | WEIGHT: 165.5 LBS | TEMPERATURE: 95.9 F | DIASTOLIC BLOOD PRESSURE: 90 MMHG | HEIGHT: 65 IN | OXYGEN SATURATION: 100 % | HEART RATE: 62 BPM | BODY MASS INDEX: 27.57 KG/M2

## 2022-07-06 DIAGNOSIS — E21.0 PRIMARY HYPERPARATHYROIDISM (H): ICD-10-CM

## 2022-07-06 DIAGNOSIS — R73.03 PREDIABETES: Primary | ICD-10-CM

## 2022-07-06 DIAGNOSIS — L30.8 PSORIASIFORM DERMATITIS: ICD-10-CM

## 2022-07-06 DIAGNOSIS — R73.9 BLOOD GLUCOSE ELEVATED: ICD-10-CM

## 2022-07-06 LAB — HBA1C MFR BLD: 6 % (ref 0–5.6)

## 2022-07-06 PROCEDURE — 82310 ASSAY OF CALCIUM: CPT | Performed by: INTERNAL MEDICINE

## 2022-07-06 PROCEDURE — 83970 ASSAY OF PARATHORMONE: CPT | Performed by: INTERNAL MEDICINE

## 2022-07-06 PROCEDURE — 36415 COLL VENOUS BLD VENIPUNCTURE: CPT | Performed by: INTERNAL MEDICINE

## 2022-07-06 PROCEDURE — 82040 ASSAY OF SERUM ALBUMIN: CPT | Performed by: INTERNAL MEDICINE

## 2022-07-06 PROCEDURE — 99214 OFFICE O/P EST MOD 30 MIN: CPT | Performed by: INTERNAL MEDICINE

## 2022-07-06 PROCEDURE — 83036 HEMOGLOBIN GLYCOSYLATED A1C: CPT | Performed by: INTERNAL MEDICINE

## 2022-07-06 RX ORDER — BETAMETHASONE DIPROPIONATE 0.5 MG/G
CREAM TOPICAL DAILY
Qty: 45 G | Refills: 1 | Status: SHIPPED | OUTPATIENT
Start: 2022-07-06 | End: 2023-04-27

## 2022-07-06 ASSESSMENT — PAIN SCALES - GENERAL: PAINLEVEL: NO PAIN (0)

## 2022-07-06 NOTE — PROGRESS NOTES
"  Assessment & Plan     Prediabetes  Discussed about diet   - Nutrition Referral; Future    Blood glucose elevated  - Hemoglobin A1c    Psoriasiform dermatitis  Reviewed derm note from last year about rash on her leg.   Prescribed betamethasone and discussed about emollient.  - betamethasone dipropionate (DIPROSONE) 0.05 % external cream; Apply topically daily    Primary hyperparathyroidism (H)  Reviewed Op note and endocrine visit details   Reviewed PTH levels  Discussed about repeating labs   - Parathyroid Hormone Intact  - Calcium  - Albumin level     BMI:   Estimated body mass index is 27.54 kg/m  as calculated from the following:    Height as of this encounter: 1.651 m (5' 5\").    Weight as of this encounter: 75.1 kg (165 lb 8 oz).   Weight management plan: Discussed healthy diet and exercise guidelines    See Patient Instructions    Return in 6 months (on 1/6/2023) for Follow up, Routine preventive, with me.    VANNESSA MOON MD  [facility] is a 56 year old, presenting for the following health issues:  Surgical Followup      HPI   Elevated BP in the clinic today. Discussed with patient to check BP at home and send Lat49 message.    Review of Systems       Objective    BP (!) 160/90   Pulse 62   Temp (!) 95.9  F (35.5  C) (Temporal)   Resp 16   Ht 1.651 m (5' 5\")   Wt 75.1 kg (165 lb 8 oz)   SpO2 100%   BMI 27.54 kg/m    Body mass index is 27.54 kg/m .  Physical Exam               .  ..  "

## 2022-07-06 NOTE — TELEPHONE ENCOUNTER
Appointments in Next Year    Jul 06, 2022  4:00 PM  (Arrive by 3:45 PM)  ED/Hospital Follow Up with Anitha Terry MD  [facility] ([facility] ) [phone]        Left a detailed message notifying patient of lab ordered and no need for fasting     Alice PRADO, Triage RN  [facility]

## 2022-07-06 NOTE — PATIENT INSTRUCTIONS
There is this is a new shingles vaccine available called shingrex  It is a series of 2 shots 2-6 months apart.  Considered more than 90% effective.  Please go to any pharmacy to get the  vaccine

## 2022-07-07 LAB
ALBUMIN SERPL-MCNC: 4 G/DL (ref 3.4–5)
CALCIUM SERPL-MCNC: 9.2 MG/DL (ref 8.5–10.1)
PTH-INTACT SERPL-MCNC: 67 PG/ML (ref 15–65)

## 2022-07-07 ASSESSMENT — PATIENT HEALTH QUESTIONNAIRE - PHQ9: SUM OF ALL RESPONSES TO PHQ QUESTIONS 1-9: 3

## 2022-07-12 ENCOUNTER — TELEPHONE (OUTPATIENT)
Dept: FAMILY MEDICINE | Facility: CLINIC | Age: 57
End: 2022-07-12

## 2022-07-12 DIAGNOSIS — E21.0 PRIMARY HYPERPARATHYROIDISM (H): Primary | ICD-10-CM

## 2022-07-12 NOTE — TELEPHONE ENCOUNTER
Patient called requesting results and provider's thought on 7/6/22 labs (especially parathyroid hormone). Requesting a call from [place]  to schedule a nutrition appt. She wanted to speak to someone from [facility]. She is also looking to see another PCP in the future.

## 2022-07-12 NOTE — TELEPHONE ENCOUNTER
Called patient, since tests were ordered by endocrine, they will get in touch with patient for results and next steps

## 2022-08-09 ENCOUNTER — OFFICE VISIT (OUTPATIENT)
Dept: NUTRITION | Facility: CLINIC | Age: 57
End: 2022-08-09
Attending: INTERNAL MEDICINE
Payer: COMMERCIAL

## 2022-08-09 DIAGNOSIS — R73.03 PREDIABETES: Primary | ICD-10-CM

## 2022-08-09 PROCEDURE — 97802 MEDICAL NUTRITION INDIV IN: CPT | Performed by: DIETITIAN, REGISTERED

## 2022-08-09 NOTE — PATIENT INSTRUCTIONS
Look at these sites for information about plant-based eating to help pre-diabetes:    PCRM.org    [employer]    If you try the low fat, plant-only meal plan for 3-4 weeks, you can see how much it helps and how you feel.     Call if you would like to schedule a follow up visit :)  [phone]    [place]

## 2022-08-09 NOTE — PROGRESS NOTES
"Medical Nutrition Therapy  Visit Type:Initial assessment and intervention    Charu Lerma presents today for MNT and education related to prediabetes. She was raised by her auntie (mom's cousin) who  from diabetes, her son has DB.  She is accompanied by self.     ASSESSMENT:   Patient comments/concerns relating to nutrition: wants to know what to eat more or less of to prevent diabetes    NUTRITION HISTORY:  Breakfast: might not have time- so might add powder to water- strawberry (has 100 calories) OR if has time beans+ cheese + 3 tortillas  Lunch is the \"heavy meal\": 2 slices pizza with grandson (once a month), typically has fish fried in coconut oil with onions and pepper or meat   Dinner: meat (liver) with broccoli and salad OR soup   Snacks: morning apple, afternoon blueberries, rare watermelon  once a week might have a sweet like chocolate covered banana  Beverages: Tea  1/day and Water 5 bottles/day  ~ feels coconut. Olive oil or avocado- eats avocado every day  Misses meals? no  Eats out:  seldom     Previous diet education:  No     Food allergies/intolerances: not reported    Diet is high in: calories, fat (saturated) and inconsistent carb intake  Diet is low in: fiber and vegetables    EXERCISE: moderate regular exercise program which includes walking 2 miles many days/week- trying to increase her intensity    SOCIO/ECONOMIC:   Lives with: self, has a son and grandson who stays over once a week    MEDICATIONS:  Current Outpatient Medications   Medication     betamethasone dipropionate (DIPROSONE) 0.05 % external cream     Vitamin D, Cholecalciferol, 50 MCG ( UT) CAPS     No current facility-administered medications for this visit.       LABS:  Last Basic Metabolic Panel:  Lab Results   Component Value Date     2022     2020      Lab Results   Component Value Date    POTASSIUM 4.3 2022    POTASSIUM 3.6 2020     Lab Results   Component Value Date "    CHLORIDE 108 01/19/2022    CHLORIDE 105 05/30/2020     Lab Results   Component Value Date    KIERA 9.2 07/06/2022    KIERA 10.8 05/30/2020     Lab Results   Component Value Date    CO2 26 01/19/2022    CO2 24 05/30/2020     Lab Results   Component Value Date    BUN 16 01/19/2022    BUN 9 05/30/2020     Lab Results   Component Value Date    CR 0.64 01/19/2022    CR 0.65 05/30/2020     Lab Results   Component Value Date     01/19/2022     05/30/2020       ANTHROPOMETRICS:  Vitals: There were no vitals taken for this visit.  There is no height or weight on file to calculate BMI.      Wt Readings from Last 5 Encounters:   07/06/22 75.1 kg (165 lb 8 oz)   04/09/22 72.6 kg (160 lb)   01/19/22 72.6 kg (160 lb)   12/23/21 69.6 kg (153 lb 8 oz)   12/20/21 68.9 kg (152 lb)       Weight Change: 12# in past 18 months    ESTIMATED KCAL REQUIREMENTS:  Not calculated    NUTRITION DIAGNOSIS: Food- and nutrition-related knowledge deficit related to pre-diabetes without prior ed and need for information as evidenced by questions asked, request for information to help reverse diabetes    NUTRITION INTERVENTION:  Nutrition Prescription: Fat Intake: minimize by not adding fat  Fiber intake: optimize by choosing more fruit, veggies, whole grains, trading meat for legumes    Education given to support: weight reduction, fat modification, fiber, heart healthy diet and low-fat plant-based meal pattern    Education Materials Provided: My Plate Planner/Choose My Plate, Carbohydrate Counting and PCRM Rx for T2D, plate planner, meal plan template    PATIENT'S BEHAVIOR CHANGE GOALS:   See Patient Instructions for patient stated behavior change goals. AVS was printed and given to patient at today's appointment.    MONITOR / EVALUATE:  RD will monitor/evaluate:  Food and nutrition knowledge / skills  Food / Beverage / Nutrient intake   Pertinent Labs  Progress toward meeting stated nutrition-related goals  Weight  change    FOLLOW-UP:  Follow up with RD as needed.    Loyda Green RD, LD, CDCES    Time spent in minutes: 50  Encounter: Individual

## 2022-08-31 ENCOUNTER — HOSPITAL ENCOUNTER (EMERGENCY)
Facility: CLINIC | Age: 57
Discharge: LEFT BEFORE DISCHARGE | End: 2022-08-31
Admitting: EMERGENCY MEDICINE
Payer: COMMERCIAL

## 2022-08-31 ENCOUNTER — NURSE TRIAGE (OUTPATIENT)
Dept: NURSING | Facility: CLINIC | Age: 57
End: 2022-08-31

## 2022-08-31 VITALS
RESPIRATION RATE: 16 BRPM | TEMPERATURE: 97.8 F | SYSTOLIC BLOOD PRESSURE: 156 MMHG | HEART RATE: 75 BPM | OXYGEN SATURATION: 99 % | DIASTOLIC BLOOD PRESSURE: 90 MMHG

## 2022-08-31 LAB
ATRIAL RATE - MUSE: 66 BPM
DIASTOLIC BLOOD PRESSURE - MUSE: NORMAL MMHG
INTERPRETATION ECG - MUSE: NORMAL
P AXIS - MUSE: 58 DEGREES
PR INTERVAL - MUSE: 176 MS
QRS DURATION - MUSE: 78 MS
QT - MUSE: 410 MS
QTC - MUSE: 429 MS
R AXIS - MUSE: 100 DEGREES
SYSTOLIC BLOOD PRESSURE - MUSE: NORMAL MMHG
T AXIS - MUSE: 79 DEGREES
VENTRICULAR RATE- MUSE: 66 BPM

## 2022-08-31 PROCEDURE — 999N000104 HC STATISTIC NO CHARGE

## 2022-08-31 PROCEDURE — 93005 ELECTROCARDIOGRAM TRACING: CPT

## 2022-08-31 NOTE — TELEPHONE ENCOUNTER
Triage Call:    Caller: Patient     Patient is calling with pain and pinching in her left chest for the last few days.  It has been present most of the time.  When she tried to do a massage to the area, it didn't take it away.    At night time she is having shortness of breath with it when waking up.     Rates pain 6/10.       Advised patient that she needs to call 911.  She states that she is already right by the hospital and is in her car, so will drive herself.      Libby Ojeda RN on 8/31/2022 at 5:24 PM    Reason for Disposition    [1] Chest pain lasts > 5 minutes AND [2] age > 44    Additional Information    Negative: SEVERE difficulty breathing (e.g., struggling for each breath, speaks in single words)    Negative: Difficult to awaken or acting confused (e.g., disoriented, slurred speech)    Negative: Shock suspected (e.g., cold/pale/clammy skin, too weak to stand, low BP, rapid pulse)    Negative: Passed out (i.e., lost consciousness, collapsed and was not responding)    Protocols used: CHEST PAIN-A-AH

## 2022-08-31 NOTE — ED TRIAGE NOTES
"Pt reports L sided anterior chest pain \"Stabbing 7/10\" radiating down to L arm x2 days.      Triage Assessment     Row Name 08/31/22 7740       Triage Assessment (Adult)    Airway WDL WDL       Respiratory WDL    Respiratory WDL WDL  denies sob       Skin Circulation/Temperature WDL    Skin Circulation/Temperature WDL WDL       Cardiac WDL    Cardiac WDL X  reports L sided CP       Peripheral/Neurovascular WDL    Peripheral Neurovascular WDL WDL       Cognitive/Neuro/Behavioral WDL    Cognitive/Neuro/Behavioral WDL WDL              "

## 2022-09-01 ENCOUNTER — HOSPITAL ENCOUNTER (EMERGENCY)
Facility: CLINIC | Age: 57
Discharge: HOME | End: 2022-09-02
Attending: EMERGENCY MEDICINE | Admitting: EMERGENCY MEDICINE
Payer: COMMERCIAL

## 2022-09-01 DIAGNOSIS — R07.9 CHEST PAIN, UNSPECIFIED TYPE: ICD-10-CM

## 2022-09-01 LAB
ATRIAL RATE - MUSE: 75 BPM
BASOPHILS # BLD AUTO: 0.1 10E3/UL (ref 0–0.2)
BASOPHILS NFR BLD AUTO: 1 %
DIASTOLIC BLOOD PRESSURE - MUSE: NORMAL MMHG
EOSINOPHIL # BLD AUTO: 0.2 10E3/UL (ref 0–0.7)
EOSINOPHIL NFR BLD AUTO: 2 %
ERYTHROCYTE [DISTWIDTH] IN BLOOD BY AUTOMATED COUNT: 12.8 % (ref 10–15)
HCT VFR BLD AUTO: 43.6 % (ref 35–47)
HGB BLD-MCNC: 14.3 G/DL (ref 11.7–15.7)
IMM GRANULOCYTES # BLD: 0 10E3/UL
IMM GRANULOCYTES NFR BLD: 0 %
INTERPRETATION ECG - MUSE: NORMAL
LYMPHOCYTES # BLD AUTO: 3.3 10E3/UL (ref 0.8–5.3)
LYMPHOCYTES NFR BLD AUTO: 31 %
MCH RBC QN AUTO: 29.5 PG (ref 26.5–33)
MCHC RBC AUTO-ENTMCNC: 32.8 G/DL (ref 31.5–36.5)
MCV RBC AUTO: 90 FL (ref 78–100)
MONOCYTES # BLD AUTO: 0.9 10E3/UL (ref 0–1.3)
MONOCYTES NFR BLD AUTO: 8 %
NEUTROPHILS # BLD AUTO: 6.2 10E3/UL (ref 1.6–8.3)
NEUTROPHILS NFR BLD AUTO: 58 %
NRBC # BLD AUTO: 0 10E3/UL
NRBC BLD AUTO-RTO: 0 /100
P AXIS - MUSE: 17 DEGREES
PLATELET # BLD AUTO: 324 10E3/UL (ref 150–450)
PR INTERVAL - MUSE: 180 MS
QRS DURATION - MUSE: 76 MS
QT - MUSE: 364 MS
QTC - MUSE: 406 MS
R AXIS - MUSE: 67 DEGREES
RBC # BLD AUTO: 4.84 10E6/UL (ref 3.8–5.2)
SYSTOLIC BLOOD PRESSURE - MUSE: NORMAL MMHG
T AXIS - MUSE: 59 DEGREES
VENTRICULAR RATE- MUSE: 75 BPM
WBC # BLD AUTO: 10.7 10E3/UL (ref 4–11)

## 2022-09-01 PROCEDURE — 80053 COMPREHEN METABOLIC PANEL: CPT | Performed by: EMERGENCY MEDICINE

## 2022-09-01 PROCEDURE — 99284 EMERGENCY DEPT VISIT MOD MDM: CPT

## 2022-09-01 PROCEDURE — 85025 COMPLETE CBC W/AUTO DIFF WBC: CPT | Performed by: EMERGENCY MEDICINE

## 2022-09-01 PROCEDURE — 36415 COLL VENOUS BLD VENIPUNCTURE: CPT | Performed by: EMERGENCY MEDICINE

## 2022-09-01 PROCEDURE — 84484 ASSAY OF TROPONIN QUANT: CPT | Performed by: EMERGENCY MEDICINE

## 2022-09-01 PROCEDURE — 93005 ELECTROCARDIOGRAM TRACING: CPT

## 2022-09-01 ASSESSMENT — ENCOUNTER SYMPTOMS
SORE THROAT: 0
SHORTNESS OF BREATH: 1
NAUSEA: 0
FEVER: 0
COUGH: 0

## 2022-09-01 NOTE — TELEPHONE ENCOUNTER
Her EKG is not normal however I will not be able to give any recommendations based on the EKG alone, she needs to be evaluated in the ER.

## 2022-09-01 NOTE — TELEPHONE ENCOUNTER
Patient Contact    Attempt # 1    Was call answered?  Yes.     Relayed provider message;    Pt requesting further interpretation of EKG.     Pt reporting she would rather be seen in clinic due to long wait time in ER.     Pt also requesting cardiology referral?    Routing to provider for review/recommendations.     [phone]    Detailed voicemail ok.

## 2022-09-01 NOTE — TELEPHONE ENCOUNTER
Patient called in as she left without being seen at ER last night after waiting for 4 hours. She had EKG done and was wanting to know the results from Dr. Terry. Patient states she is no longer having the chest pain but wants to know what the EKG showed    Teja Fletcher RN

## 2022-09-01 NOTE — TELEPHONE ENCOUNTER
SAMREENI   Discussed with pt of seriousness of abnormal ekg, combined with chest pain, age factors, high bp, etc.   Pt has now agreed to go back to ER.  Ina Carter RN  [facility] RN Triage Team

## 2022-09-02 VITALS
OXYGEN SATURATION: 95 % | DIASTOLIC BLOOD PRESSURE: 79 MMHG | SYSTOLIC BLOOD PRESSURE: 148 MMHG | WEIGHT: 165 LBS | HEART RATE: 83 BPM | HEIGHT: 65 IN | RESPIRATION RATE: 14 BRPM | BODY MASS INDEX: 27.49 KG/M2 | TEMPERATURE: 97.1 F

## 2022-09-02 LAB
ALBUMIN SERPL-MCNC: 4.1 G/DL (ref 3.4–5)
ALP SERPL-CCNC: 121 U/L (ref 40–150)
ALT SERPL W P-5'-P-CCNC: 27 U/L (ref 0–50)
ANION GAP SERPL CALCULATED.3IONS-SCNC: 7 MMOL/L (ref 3–14)
AST SERPL W P-5'-P-CCNC: 20 U/L (ref 0–45)
BILIRUB SERPL-MCNC: 0.6 MG/DL (ref 0.2–1.3)
BUN SERPL-MCNC: 13 MG/DL (ref 7–30)
CALCIUM SERPL-MCNC: 9.4 MG/DL (ref 8.5–10.1)
CHLORIDE BLD-SCNC: 106 MMOL/L (ref 94–109)
CO2 SERPL-SCNC: 25 MMOL/L (ref 20–32)
CREAT SERPL-MCNC: 0.55 MG/DL (ref 0.52–1.04)
GFR SERPL CREATININE-BSD FRML MDRD: >90 ML/MIN/1.73M2
GLUCOSE BLD-MCNC: 96 MG/DL (ref 70–99)
POTASSIUM BLD-SCNC: 3.7 MMOL/L (ref 3.4–5.3)
PROT SERPL-MCNC: 8.5 G/DL (ref 6.8–8.8)
SODIUM SERPL-SCNC: 138 MMOL/L (ref 133–144)
TROPONIN I SERPL HS-MCNC: 3 NG/L

## 2022-09-02 NOTE — ED TRIAGE NOTES
Patient states chest pain x3 days.       Triage Assessment     Row Name 09/01/22 1943       Triage Assessment (Adult)    Airway WDL WDL       Respiratory WDL    Respiratory WDL WDL       Skin Circulation/Temperature WDL    Skin Circulation/Temperature WDL WDL       Cardiac WDL    Cardiac WDL X;chest pain       Chest Pain Assessment    Chest Pain Location midsternal       Peripheral/Neurovascular WDL    Peripheral Neurovascular WDL WDL       Cognitive/Neuro/Behavioral WDL    Cognitive/Neuro/Behavioral WDL WDL

## 2022-09-02 NOTE — ED NOTES
Pt states has L chest pain intermittent, feels stressed. Recent dental work on Monday. SOB at night when lying in bed during onset of chest pain. Denies NVD. No peripheral edema.

## 2022-09-02 NOTE — ED PROVIDER NOTES
History   Chief Complaint:  Chest Pain    The history is provided by the patient.      Charu Lerma is a 57 year old female with history of hyperlipidemia who presents with chest pain. Patient reports 3 days of left sided chest pain that is intermittent. She notes that the pain lasts for a few minutes. Pain is exacerbated with movement of her left arm and when she lies on her left side. She states that she currently is experiencing a mild exacerbation of the pain, which she attributes to stress, however she reports that her episode currently is not as severe as episodes throughout the past 3 days. She adds that she has shortness of breath in association with the chest pain, but only at night. No nausea, cough, fever, congestion or sore throat. Nonsmoker. No history of heart problems. She is taking ibuprofen and antibiotics for pain in her jaw from a recent dental procedure. She last took ibuprofen last night.     Review of Systems   Constitutional: Negative for fever.   HENT: Negative for congestion and sore throat.    Respiratory: Positive for shortness of breath. Negative for cough.    Cardiovascular: Positive for chest pain.   Gastrointestinal: Negative for nausea.   All other systems reviewed and are negative.    Allergies:  No Known Allergies    Medications:  The patient is not currently taking any prescribed medications.    Past Medical History:     Hyperlipidemia  Obese  Vitamin D deficiency  Depression  Hypercalcemia  Hyperparathyroidism  Onychomycosis  Parathyroid adenoma  Labial lesion  Prediabetes  Psoriasiform dermatitis     Past Surgical History:    Right parathyroidectomy  Tubal ligation     Social History:  Presents alone  Presents via private vehicle  PCP: Anitha Terry     Physical Exam     Patient Vitals for the past 24 hrs:   BP Temp Pulse Resp SpO2 Height Weight   09/02/22 0020 (!) 148/79 -- 83 -- 95 % -- --   09/01/22 2340 (!) 188/102 -- 75 -- 98 % -- --   09/01/22 1944 (!)  "160/71 97.1  F (36.2  C) 78 14 98 % 1.651 m (5' 5\") 74.8 kg (165 lb)       Physical Exam  General: Appears well-developed and well-nourished.   Head: No signs of trauma.   CV: Normal rate and regular rhythm.  2+ radial pulses  Resp: Effort normal and breath sounds normal. No respiratory distress.   GI: Soft. There is no tenderness.  No rebound or guarding.  Normal bowel sounds.    MSK: Normal range of motion. no edema. No Calf tenderness.  +left anterior chest wall tenderness.  Neuro: The patient is alert and oriented. Speech normal.  Skin: Skin is warm and dry. No rash noted.   Psych: normal mood and affect. behavior is normal.       Emergency Department Course   ECG  ECG results from 09/01/22 @ 1955   EKG 12-lead, tracing only     Value    Systolic Blood Pressure     Diastolic Blood Pressure     Ventricular Rate 75    Atrial Rate 75    WY Interval 180    QRS Duration 76        QTc 406    P Axis 17    R AXIS 67    T Axis 59    Interpretation ECG      Sinus rhythm  Cannot rule out Inferior infarct  Cannot rule out Anterior infarct  Read by me @ 0596       Laboratory:  Labs Ordered and Resulted from Time of ED Arrival to Time of ED Departure   TROPONIN I - Normal       Result Value    Troponin I High Sensitivity 3     COMPREHENSIVE METABOLIC PANEL - Normal    Sodium 138      Potassium 3.7      Chloride 106      Carbon Dioxide (CO2) 25      Anion Gap 7      Urea Nitrogen 13      Creatinine 0.55      Calcium 9.4      Glucose 96      Alkaline Phosphatase 121      AST 20      ALT 27      Protein Total 8.5      Albumin 4.1      Bilirubin Total 0.6      GFR Estimate >90     CBC WITH PLATELETS AND DIFFERENTIAL    WBC Count 10.7      RBC Count 4.84      Hemoglobin 14.3      Hematocrit 43.6      MCV 90      MCH 29.5      MCHC 32.8      RDW 12.8      Platelet Count 324      % Neutrophils 58      % Lymphocytes 31      % Monocytes 8      % Eosinophils 2      % Basophils 1      % Immature Granulocytes 0      NRBCs per 100 " WBC 0      Absolute Neutrophils 6.2      Absolute Lymphocytes 3.3      Absolute Monocytes 0.9      Absolute Eosinophils 0.2      Absolute Basophils 0.1      Absolute Immature Granulocytes 0.0      Absolute NRBCs 0.0        Emergency Department Course:     Reviewed:  I reviewed nursing notes, vitals and past medical history    Assessments:  2340 I obtained history and examined the patient as noted above.   0033 I rechecked the patient and explained findings. Amenable to discharge home, as chest pain is resolved.    Disposition:  The patient was discharged to home.     Impression & Plan   Medical Decision Making:  Charu Lerma Is a 57-year-old woman presents due to chest pain.  Over the last 3 days she has had intermittent chest pain to the left chest.  Pain is worse when she presses on the area, moves her arms around, or lies on her side.  On my evaluation I could reproduce the pain by pressing on the left anterior chest wall.  Blood work and EKG were obtained.  EKG did not show concerning acute ST segment changes and blood work was obtained including a troponin which was negative.  Given she had symptoms for multiple days with a reassuring work-up and low risk story, I felt that she is appropriate for discharge and recommended supportive care and follow-up with her doctor in clinic.    Diagnosis:    ICD-10-CM    1. Chest pain, unspecified type  R07.9      Scribe Disclosure:  I, Sandi Pena, am serving as a scribe at 11:32 PM on 9/1/2022 to document services personally performed by Ronn Richardson MD based on my observations and the provider's statements to me.            Ronn Richardson MD  09/07/22 0991

## 2022-09-03 ENCOUNTER — HEALTH MAINTENANCE LETTER (OUTPATIENT)
Age: 57
End: 2022-09-03

## 2022-12-28 ENCOUNTER — VIRTUAL VISIT (OUTPATIENT)
Dept: FAMILY MEDICINE | Facility: CLINIC | Age: 57
End: 2022-12-28
Payer: COMMERCIAL

## 2022-12-28 DIAGNOSIS — U07.1 INFECTION DUE TO 2019 NOVEL CORONAVIRUS: Primary | ICD-10-CM

## 2022-12-28 PROCEDURE — 99213 OFFICE O/P EST LOW 20 MIN: CPT | Mod: CS | Performed by: PHYSICIAN ASSISTANT

## 2022-12-28 RX ORDER — NIRMATRELVIR AND RITONAVIR 300-100 MG
3 KIT ORAL 2 TIMES DAILY
Qty: 30 EACH | Refills: 0 | Status: SHIPPED | OUTPATIENT
Start: 2022-12-28 | End: 2023-01-23

## 2022-12-28 ASSESSMENT — PATIENT HEALTH QUESTIONNAIRE - PHQ9: SUM OF ALL RESPONSES TO PHQ QUESTIONS 1-9: 0

## 2022-12-28 NOTE — PROGRESS NOTES
Charu is a 57 year old who is being evaluated via a billable video visit.      How would you like to obtain your AVS? MyChart  If the video visit is dropped, the invitation should be resent by: Text to cell phone: [phone]  Will anyone else be joining your video visit? No        Assessment & Plan     Infection due to 2019 novel coronavirus  Discussed potential treatment with Paxlovid.  I believe she would gather some benefit from taking his to avoid worsening disease/hospitalization given review of her medical history.  Sounds fine over the phone, mildly congested.  In no acute distress.  Reviewed with her Paxlovid instructions as well as side effects and expectations.  She wishes to start this.  We will send to pharmacy.  Push fluids.  Rest.  Continue OTC meds.  Discussed isolation protocols.  Comfortable with plan.  Encouraged her to reach out with any new, worsening or not improving symptoms.    - nirmatrelvir and ritonavir (PAXLOVID, 300/100,) therapy pack  Dispense: 30 each; Refill: 0    Back anyway  20 minutes spent on the date of the encounter doing chart review, review of test results, interpretation of tests, patient visit and documentation        No follow-ups on file.    The likelihood of other entities in the differential is insufficient to justify any further testing for them at this time. This was explained to the patient. The patient was advised that persistent or worsening symptoms would require further evaluation. Patient advised to call the office and if unable to reach to go to the emergency room if they develop any new or worsening symptoms. Expressed understanding and agreement with above stated plan.     CHAPIS Alvarez [facility] NEREIDA Box is a 57 year old presenting for the following health issues:  Covid Concern      Presents today for evaluation of COVID-19 positive test.  Symptoms began on 12/26/2022.  Was set up for an in office visit  today was instructed to test at home COVID-19 prior to arrival which was positive.  Symptoms initially began as laryngitis and a cough.  Subjective fevers.  No recorded temperature.  Does feel achy.  Mild sore throat.  Denies shortness of breath, chest pain, abdominal pain, nausea, vomiting, diarrhea or calf swelling/pain/redness.  History of COVID-19 in 2020.  Vaccinated x3    History of hyperlipidemia, hyperparathyroidism status post subtotal parathyroidectomy, prediabetes.     COVID-19 Symptom Review  How many days ago did these symptoms start? 12/26/2022    Are any of the following symptoms significant for you?    New or worsening difficulty breathing? No    Worsening cough? Yes, it's a dry cough.     Fever or chills? Yes, I felt feverish or had chills.    Headache: YES    Sore throat: YES    Chest pain: No    Diarrhea: No    Body aches? YES    What treatments has patient tried? Acetaminophen   Does patient live in a nursing home, group home, or shelter? No  Does patient have a way to get food/medications during quarantined? Yes, I have a friend or family member who can help me.          Review of Systems   Constitutional, HEENT, cardiovascular, pulmonary, GI, , musculoskeletal, neuro, skin, endocrine and psych systems are negative, except as otherwise noted.      Objective         Vitals:  No vitals were obtained today due to virtual visit.    Allergies   Allergen Reactions     No Known Allergies      Current Outpatient Medications   Medication Sig Dispense Refill     betamethasone dipropionate (DIPROSONE) 0.05 % external cream Apply topically daily 45 g 1     blood glucose (NO BRAND SPECIFIED) test strip Use to check blood sugar 1 times daily or as directed. 50 strip 1     nirmatrelvir and ritonavir (PAXLOVID, 300/100,) therapy pack Take 3 tablets by mouth 2 times daily 30 each 0     Vitamin D, Cholecalciferol, 50 MCG (2000 UT) CAPS Take 2,000 Units by mouth At Bedtime 100 capsule 3     Past Medical History:    Diagnosis Date     Hypercalcemia 2017     Hyperparathyroidism, primary (H)      Hypovitaminosis D 2016     Low bone density 10/2019     Past Surgical History:   Procedure Laterality Date     PARATHYROIDECTOMY Right 12/23/2021    Procedure: Right neck exploration, excision of parathyroid adenoma;  Surgeon: Robbin Hobbs MD;  Location: [facility] OR     TUBAL LIGATION         Physical Exam   GENERAL: Healthy, alert and no distress  EYES: Eyes grossly normal to inspection.  No discharge or erythema, or obvious scleral/conjunctival abnormalities.  RESP: No audible wheeze, cough, or visible cyanosis.  No visible retractions or increased work of breathing.    SKIN: Visible skin clear. No significant rash, abnormal pigmentation or lesions.  NEURO: Cranial nerves grossly intact.  Mentation and speech appropriate for age.  PSYCH: Mentation appears normal, affect normal/bright, judgement and insight intact, normal speech and appearance well-groomed.      Video-Visit Details    Type of service:  Video Visit   Video Start Time: 3:00  Video End Time:3:18 PM    Originating Location (pt. Location): Home    Distant Location (provider location):  On-site  Platform used for Video Visit: Amanda

## 2022-12-30 ENCOUNTER — LAB (OUTPATIENT)
Dept: LAB | Facility: CLINIC | Age: 57
End: 2022-12-30
Payer: COMMERCIAL

## 2022-12-30 DIAGNOSIS — E21.0 PRIMARY HYPERPARATHYROIDISM (H): ICD-10-CM

## 2022-12-30 LAB
ALBUMIN SERPL BCG-MCNC: 4 G/DL (ref 3.5–5.2)
CALCIUM SERPL-MCNC: 9.2 MG/DL (ref 8.6–10)
DEPRECATED CALCIDIOL+CALCIFEROL SERPL-MC: 32 UG/L (ref 20–75)
PTH-INTACT SERPL-MCNC: 32 PG/ML (ref 15–65)

## 2022-12-30 PROCEDURE — 82040 ASSAY OF SERUM ALBUMIN: CPT

## 2022-12-30 PROCEDURE — 83970 ASSAY OF PARATHORMONE: CPT

## 2022-12-30 PROCEDURE — 82306 VITAMIN D 25 HYDROXY: CPT

## 2022-12-30 PROCEDURE — 82310 ASSAY OF CALCIUM: CPT

## 2022-12-30 PROCEDURE — 36415 COLL VENOUS BLD VENIPUNCTURE: CPT

## 2023-01-10 DIAGNOSIS — E21.0 PRIMARY HYPERPARATHYROIDISM (H): Primary | ICD-10-CM

## 2023-01-10 NOTE — RESULT ENCOUNTER NOTE
Dear Charu,     Here are your recent results which are within the expected range. Everything looks good! Please continue with your current plan of care and let us know if you have any questions or concerns. Plan to recheck at 6 months and 1 year-- everything good at that point you can continue following with annual checks with your PCP.     Regards,  Jena Kmi MD

## 2023-01-14 ENCOUNTER — HEALTH MAINTENANCE LETTER (OUTPATIENT)
Age: 58
End: 2023-01-14

## 2023-04-26 ENCOUNTER — TRANSFERRED RECORDS (OUTPATIENT)
Dept: HEALTH INFORMATION MANAGEMENT | Facility: CLINIC | Age: 58
End: 2023-04-26
Payer: COMMERCIAL

## 2023-04-27 ENCOUNTER — OFFICE VISIT (OUTPATIENT)
Dept: FAMILY MEDICINE | Facility: CLINIC | Age: 58
End: 2023-04-27
Payer: COMMERCIAL

## 2023-04-27 VITALS
OXYGEN SATURATION: 99 % | WEIGHT: 152.1 LBS | HEART RATE: 59 BPM | RESPIRATION RATE: 20 BRPM | HEIGHT: 65 IN | BODY MASS INDEX: 25.34 KG/M2 | TEMPERATURE: 97.1 F | DIASTOLIC BLOOD PRESSURE: 77 MMHG | SYSTOLIC BLOOD PRESSURE: 133 MMHG

## 2023-04-27 DIAGNOSIS — E55.9 VITAMIN D DEFICIENCY: ICD-10-CM

## 2023-04-27 DIAGNOSIS — R68.89 THROAT SYMPTOM: ICD-10-CM

## 2023-04-27 DIAGNOSIS — R03.0 ELEVATED BLOOD PRESSURE READING WITHOUT DIAGNOSIS OF HYPERTENSION: Primary | ICD-10-CM

## 2023-04-27 DIAGNOSIS — E21.0 PRIMARY HYPERPARATHYROIDISM (H): ICD-10-CM

## 2023-04-27 PROCEDURE — 99215 OFFICE O/P EST HI 40 MIN: CPT | Performed by: NURSE PRACTITIONER

## 2023-04-27 RX ORDER — MULTIVIT-MIN/IRON/FOLIC ACID/K 18-600-40
2000 CAPSULE ORAL AT BEDTIME
Qty: 100 CAPSULE | Refills: 3 | Status: SHIPPED | OUTPATIENT
Start: 2023-04-27

## 2023-04-27 ASSESSMENT — PATIENT HEALTH QUESTIONNAIRE - PHQ9: SUM OF ALL RESPONSES TO PHQ QUESTIONS 1-9: 0

## 2023-04-27 ASSESSMENT — PAIN SCALES - GENERAL: PAINLEVEL: NO PAIN (0)

## 2023-04-27 NOTE — PROGRESS NOTES
"  Assessment & Plan     (R03.0) Elevated blood pressure reading without diagnosis of hypertension  (primary encounter diagnosis)  Comment: Pt went to the dentist yesterday for extraction and BP was elevated. She is here today for clearance. BP is normal today. No concerns. She is due for annual visit this summer so this is scheduled. Told to bring in her home BP cuff next time. Form was faxed and returned to pt    Plan:     (E55.9) Vitamin D deficiency  Comment: refilled   Plan: Vitamin D, Cholecalciferol, 50 MCG (2000 UT)         CAPS            (R68.89) Throat symptom  Comment: itching in throat since her parathyroid surgery. States it is at the very back of her tongue. Will refer to ENT. Also gets a dry nose in the morning. Instructed to try saline nasal spray at night before bed.  Told to cut back on her lemon/lime shot that she takes everyday.   Plan: Adult ENT  Referral            (E21.0) Primary hyperparathyroidism (H)  Comment: Overdue for follow-up with endo. Referral placed.   Plan: Adult Endocrinology  Referral            40 minutes spent on the date of the encounter doing chart review, history and exam, documentation and further activities as noted above       MARIA E Mendoza [facility] NEREIDA Box is a 57 year old, presenting for the following health issues:  Blood Pressure Check, Form Request (Fill out \"dental clinic procedure ok\"), and Derm Problem (Itchiness back of the throat/nasal passage , hx of removal of nodule on thyroid gland)    History of Present Illness       Hyperlipidemia:  She presents for follow up of hyperlipidemia.  She is not taking medication to lower cholesterol. She is not having myalgia or other side effects to statin medications.    Hypertension: She presents for follow up of hypertension.  She does check blood pressure  regularly outside of the clinic. Outside blood pressures have been over 140/90. She follows a " "low salt diet.     She eats 2-3 servings of fruits and vegetables daily.She consumes 1 sweetened beverage(s) daily.She exercises with enough effort to increase her heart rate 20 to 29 minutes per day.  She exercises with enough effort to increase her heart rate 4 days per week.   She is taking medications regularly.     Went to dentist yesterday for extraction. BP was elevated. Told she needs clearance before she can go back.     Throat itching almost daily since her parathyroid surgery over 1 year ago.   Also nose is dry in the morning   No heartburn   Does a full shot glass of lemon/lime juice everyday thinking that could help or would be healthy         Review of Systems   Detailed as above         Objective    /77 (BP Location: Left arm, Patient Position: Sitting, Cuff Size: Adult Large)   Pulse 59   Temp 97.1  F (36.2  C) (Oral)   Resp 20   Ht 1.651 m (5' 5\")   Wt 69 kg (152 lb 1.6 oz)   SpO2 99%   BMI 25.31 kg/m    Body mass index is 25.31 kg/m .  Physical Exam  Constitutional:       Appearance: Normal appearance.   Pulmonary:      Effort: Pulmonary effort is normal.   Neurological:      Mental Status: She is alert.   Psychiatric:         Mood and Affect: Mood normal.                    "

## 2023-04-27 NOTE — PATIENT INSTRUCTIONS
Saline nasal spray every night before bed     Drink plenty of water     For the throat symptoms:   [facility]   Located in: [facility]  Address: [address], [place] [address]  Phone: [phone]       Please follow up with endocrinology. You will get a call from us to get scheduled.

## 2023-05-12 ENCOUNTER — TRANSFERRED RECORDS (OUTPATIENT)
Dept: HEALTH INFORMATION MANAGEMENT | Facility: CLINIC | Age: 58
End: 2023-05-12

## 2023-05-12 ENCOUNTER — LAB (OUTPATIENT)
Dept: LAB | Facility: CLINIC | Age: 58
End: 2023-05-12
Payer: COMMERCIAL

## 2023-05-12 DIAGNOSIS — J39.2 IRRITATED THROAT: ICD-10-CM

## 2023-05-12 DIAGNOSIS — J34.89 NOSE IRRITATION: Primary | ICD-10-CM

## 2023-05-12 PROCEDURE — 36415 COLL VENOUS BLD VENIPUNCTURE: CPT

## 2023-05-12 PROCEDURE — 82785 ASSAY OF IGE: CPT

## 2023-05-12 PROCEDURE — 86003 ALLG SPEC IGE CRUDE XTRC EA: CPT

## 2023-05-22 ENCOUNTER — TRANSFERRED RECORDS (OUTPATIENT)
Dept: HEALTH INFORMATION MANAGEMENT | Facility: CLINIC | Age: 58
End: 2023-05-22
Payer: COMMERCIAL

## 2023-05-23 LAB
A ALTERNATA IGE QN: <0.1 KU(A)/L
A FUMIGATUS IGE QN: <0.1 KU(A)/L
BERMUDA GRASS IGE QN: <0.1 KU(A)/L
C HERBARUM IGE QN: 0.12 KU(A)/L
CAT DANDER IGG QN: <0.1 KU(A)/L
CEDAR IGE QN: <0.1 KU(A)/L
COMMON RAGWEED IGE QN: ABNORMAL
COTTONWOOD IGE QN: <0.1 KU(A)/L
D FARINAE IGE QN: <0.1 KU(A)/L
D PTERONYSS IGE QN: <0.1 KU(A)/L
DOG DANDER+EPITH IGE QN: <0.1 KU(A)/L
IGE SERPL-ACNC: 23 KU/L (ref 0–114)
MAPLE IGE QN: ABNORMAL
MARSH ELDER IGE QN: <0.1 KU(A)/L
MOUSE URINE PROT IGE QN: <0.1 KU(A)/L
NETTLE IGE QN: <0.1 KU(A)/L
P NOTATUM IGE QN: <0.1 KU(A)/L
ROACH IGE QN: <0.1 KU(A)/L
SALTWORT IGE QN: <0.1 KU(A)/L
SILVER BIRCH IGE QN: <0.1 KU(A)/L
TIMOTHY IGE QN: ABNORMAL
WHITE ASH IGE QN: <0.1 KU(A)/L
WHITE ELM IGE QN: <0.1 KU(A)/L
WHITE MULBERRY IGE QN: <0.1 KU(A)/L
WHITE OAK IGE QN: <0.1 KU(A)/L

## 2023-07-25 ENCOUNTER — OFFICE VISIT (OUTPATIENT)
Dept: FAMILY MEDICINE | Facility: CLINIC | Age: 58
End: 2023-07-25
Payer: COMMERCIAL

## 2023-07-25 VITALS
BODY MASS INDEX: 29.66 KG/M2 | DIASTOLIC BLOOD PRESSURE: 80 MMHG | HEIGHT: 61 IN | WEIGHT: 157.1 LBS | TEMPERATURE: 97.7 F | HEART RATE: 74 BPM | RESPIRATION RATE: 21 BRPM | SYSTOLIC BLOOD PRESSURE: 148 MMHG | OXYGEN SATURATION: 100 %

## 2023-07-25 DIAGNOSIS — Z90.89 S/P SUBTOTAL PARATHYROIDECTOMY: ICD-10-CM

## 2023-07-25 DIAGNOSIS — R03.0 ELEVATED BLOOD-PRESSURE READING WITHOUT DIAGNOSIS OF HYPERTENSION: ICD-10-CM

## 2023-07-25 DIAGNOSIS — R73.03 PREDIABETES: ICD-10-CM

## 2023-07-25 DIAGNOSIS — D35.1 PARATHYROID ADENOMA: ICD-10-CM

## 2023-07-25 DIAGNOSIS — Z00.00 ROUTINE HISTORY AND PHYSICAL EXAMINATION OF ADULT: Primary | ICD-10-CM

## 2023-07-25 DIAGNOSIS — Z12.31 ENCOUNTER FOR SCREENING MAMMOGRAM FOR BREAST CANCER: ICD-10-CM

## 2023-07-25 DIAGNOSIS — M85.9 LOW BONE DENSITY: ICD-10-CM

## 2023-07-25 DIAGNOSIS — Z98.890 S/P SUBTOTAL PARATHYROIDECTOMY: ICD-10-CM

## 2023-07-25 DIAGNOSIS — E78.5 HYPERLIPIDEMIA, UNSPECIFIED HYPERLIPIDEMIA TYPE: ICD-10-CM

## 2023-07-25 PROBLEM — R73.9 BLOOD GLUCOSE ELEVATED: Status: RESOLVED | Noted: 2022-07-06 | Resolved: 2023-07-25

## 2023-07-25 LAB
ALBUMIN SERPL BCG-MCNC: 4.4 G/DL (ref 3.5–5.2)
ALP SERPL-CCNC: 109 U/L (ref 35–104)
ALT SERPL W P-5'-P-CCNC: 26 U/L (ref 0–50)
ANION GAP SERPL CALCULATED.3IONS-SCNC: 13 MMOL/L (ref 7–15)
AST SERPL W P-5'-P-CCNC: 28 U/L (ref 0–45)
BASOPHILS # BLD AUTO: 0 10E3/UL (ref 0–0.2)
BASOPHILS NFR BLD AUTO: 1 %
BILIRUB SERPL-MCNC: 0.2 MG/DL
BUN SERPL-MCNC: 11.7 MG/DL (ref 8–23)
CALCIUM SERPL-MCNC: 9 MG/DL (ref 8.8–10.2)
CHLORIDE SERPL-SCNC: 104 MMOL/L (ref 98–107)
CHOLEST SERPL-MCNC: 261 MG/DL
CREAT SERPL-MCNC: 0.61 MG/DL (ref 0.51–0.95)
DEPRECATED HCO3 PLAS-SCNC: 23 MMOL/L (ref 22–29)
EOSINOPHIL # BLD AUTO: 0.2 10E3/UL (ref 0–0.7)
EOSINOPHIL NFR BLD AUTO: 3 %
ERYTHROCYTE [DISTWIDTH] IN BLOOD BY AUTOMATED COUNT: 13 % (ref 10–15)
GFR SERPL CREATININE-BSD FRML MDRD: >90 ML/MIN/1.73M2
GLUCOSE SERPL-MCNC: 116 MG/DL (ref 70–99)
HBA1C MFR BLD: 5.9 % (ref 0–5.6)
HCT VFR BLD AUTO: 42.5 % (ref 35–47)
HDLC SERPL-MCNC: 53 MG/DL
HGB BLD-MCNC: 13.6 G/DL (ref 11.7–15.7)
IMM GRANULOCYTES # BLD: 0 10E3/UL
IMM GRANULOCYTES NFR BLD: 0 %
LDLC SERPL CALC-MCNC: 178 MG/DL
LYMPHOCYTES # BLD AUTO: 2.2 10E3/UL (ref 0.8–5.3)
LYMPHOCYTES NFR BLD AUTO: 28 %
MCH RBC QN AUTO: 29.4 PG (ref 26.5–33)
MCHC RBC AUTO-ENTMCNC: 32 G/DL (ref 31.5–36.5)
MCV RBC AUTO: 92 FL (ref 78–100)
MONOCYTES # BLD AUTO: 0.5 10E3/UL (ref 0–1.3)
MONOCYTES NFR BLD AUTO: 6 %
NEUTROPHILS # BLD AUTO: 5 10E3/UL (ref 1.6–8.3)
NEUTROPHILS NFR BLD AUTO: 63 %
NONHDLC SERPL-MCNC: 208 MG/DL
PLATELET # BLD AUTO: 278 10E3/UL (ref 150–450)
POTASSIUM SERPL-SCNC: 3.9 MMOL/L (ref 3.4–5.3)
PROT SERPL-MCNC: 7.6 G/DL (ref 6.4–8.3)
RBC # BLD AUTO: 4.63 10E6/UL (ref 3.8–5.2)
SODIUM SERPL-SCNC: 140 MMOL/L (ref 136–145)
TRIGL SERPL-MCNC: 151 MG/DL
WBC # BLD AUTO: 8 10E3/UL (ref 4–11)

## 2023-07-25 PROCEDURE — 80053 COMPREHEN METABOLIC PANEL: CPT | Performed by: INTERNAL MEDICINE

## 2023-07-25 PROCEDURE — 99396 PREV VISIT EST AGE 40-64: CPT | Performed by: INTERNAL MEDICINE

## 2023-07-25 PROCEDURE — 83036 HEMOGLOBIN GLYCOSYLATED A1C: CPT | Performed by: INTERNAL MEDICINE

## 2023-07-25 PROCEDURE — 85025 COMPLETE CBC W/AUTO DIFF WBC: CPT | Performed by: INTERNAL MEDICINE

## 2023-07-25 PROCEDURE — 80061 LIPID PANEL: CPT | Performed by: INTERNAL MEDICINE

## 2023-07-25 PROCEDURE — 36415 COLL VENOUS BLD VENIPUNCTURE: CPT | Performed by: INTERNAL MEDICINE

## 2023-07-25 ASSESSMENT — ENCOUNTER SYMPTOMS
MYALGIAS: 0
FREQUENCY: 0
EYE PAIN: 0
COUGH: 0
DIZZINESS: 0
DIARRHEA: 0
NAUSEA: 0
JOINT SWELLING: 0
SORE THROAT: 0
HEMATOCHEZIA: 0
CONSTIPATION: 0
HEARTBURN: 0
WEAKNESS: 0
PALPITATIONS: 0
PARESTHESIAS: 0
ARTHRALGIAS: 0
HEMATURIA: 0
NERVOUS/ANXIOUS: 0
BREAST MASS: 0
FEVER: 0
HEADACHES: 0
ABDOMINAL PAIN: 0
DYSURIA: 0
SHORTNESS OF BREATH: 0
CHILLS: 0

## 2023-07-25 ASSESSMENT — PAIN SCALES - GENERAL: PAINLEVEL: NO PAIN (0)

## 2023-07-25 NOTE — PROGRESS NOTES
SUBJECTIVE:   CC: Charu is an 58 year old who presents for preventive health visit.         Healthy Habits:     Getting at least 3 servings of Calcium per day:  Yes    Bi-annual eye exam:  Yes    Dental care twice a year:  Yes    Sleep apnea or symptoms of sleep apnea:  None    Diet:  Regular (no restrictions)    Frequency of exercise:  4-5 days/week    Duration of exercise:  45-60 minutes    Taking medications regularly:  Not Applicable    Barriers to taking medications:  Not applicable    Medication side effects:  Not applicable    Additional concerns today:  No          Social History     Tobacco Use    Smoking status: Never    Smokeless tobacco: Never   Substance Use Topics    Alcohol use: No           7/25/2023     9:33 AM   Alcohol Use   Prescreen: >3 drinks/day or >7 drinks/week? No             9/30/2021     9:58 AM   Breast CA Risk Assessment (FHS-7)   Do you have a family history of breast, colon, or ovarian cancer? No / Unknown           Pertinent mammograms are reviewed under the imaging tab.    History of abnormal Pap smear:       Latest Ref Rng & Units 10/28/2021     9:33 AM 9/27/2016     9:44 AM 9/27/2016    12:00 AM   PAP / HPV   PAP  Negative for Intraepithelial Lesion or Malignancy (NILM)      PAP (Historical)    NIL    HPV 16 DNA Negative Negative  Negative     HPV 18 DNA Negative Negative  Negative     Other HR HPV Negative Negative  Negative       Reviewed and updated as needed this visit by clinical staff   Tobacco  Allergies  Meds              Reviewed and updated as needed this visit by Provider                   PCP: Dr Terry  Specialists: endo    Review of Systems   Constitutional:  Negative for chills and fever.   HENT:  Negative for congestion, ear pain, hearing loss and sore throat.    Eyes:  Negative for pain and visual disturbance.   Respiratory:  Negative for cough and shortness of breath.    Cardiovascular:  Negative for chest pain, palpitations and peripheral edema.  "  Gastrointestinal:  Negative for abdominal pain, constipation, diarrhea, heartburn, hematochezia and nausea.   Breasts:  Negative for tenderness, breast mass and discharge.   Genitourinary:  Negative for dysuria, frequency, genital sores, hematuria, pelvic pain, urgency, vaginal bleeding and vaginal discharge.   Musculoskeletal:  Negative for arthralgias, joint swelling and myalgias.   Skin:  Negative for rash.   Neurological:  Negative for dizziness, weakness, headaches and paresthesias.   Psychiatric/Behavioral:  Negative for mood changes. The patient is not nervous/anxious.           OBJECTIVE:   BP (!) 148/80 (BP Location: Right arm, Patient Position: Sitting, Cuff Size: Adult Large)   Pulse 74   Temp 97.7  F (36.5  C) (Temporal)   Resp 21   Ht 1.549 m (5' 1\")   Wt 71.3 kg (157 lb 1.6 oz)   SpO2 100%   BMI 29.68 kg/m    Physical Exam    GENERAL APPEARANCE: AAOx3, no distress. Well developed.    RESP: Lungs CTA bilaterally. No w/r/r. No distress     CV: RRR, S1/S2 present. No m/r/c.     ABDOMEN:  soft, nontender, no distention. No rebound or guarding.     EXT: No c/c/e in lower extremities b/l. No rashes or deformities noted.    PSYCH: appropriate mood and affect.     ASSESSMENT/PLAN:   Charu was seen today for physical.    Diagnoses and all orders for this visit:    Routine history and physical examination of adult   Pap UTD   Cscope reportedly UTD per patient   Mammo due-recommend yearly, referral placed.   Update indicated labs    Parathyroid adenoma  Low bone density  S/P subtotal parathyroidectomy (H)   Following endo, has follow-up with labs prior in October. Reminded patient.    Prediabetes  -     Hemoglobin A1c; Future    Hyperlipidemia, unspecified hyperlipidemia type  -     Lipid panel reflex to direct LDL Fasting; Future    Encounter for screening mammogram for breast cancer  -     MA Screen Bilateral w/Tigre; Future    Elevated blood-pressure reading without diagnosis of " hypertension  Discussed recommendation for home BP monitoring and follow-up if above goal  -     CBC with Platelets & Differential; Future  -     Comprehensive metabolic panel; Future          She reports that she has never smoked. She has never used smokeless tobacco.      Jeannette Esquivel DO  [facility]

## 2023-08-16 ENCOUNTER — HOSPITAL ENCOUNTER (OUTPATIENT)
Dept: MAMMOGRAPHY | Facility: CLINIC | Age: 58
Discharge: HOME | End: 2023-08-16
Attending: INTERNAL MEDICINE | Admitting: INTERNAL MEDICINE
Payer: COMMERCIAL

## 2023-08-16 DIAGNOSIS — Z12.31 ENCOUNTER FOR SCREENING MAMMOGRAM FOR BREAST CANCER: ICD-10-CM

## 2023-08-16 PROCEDURE — 77067 SCR MAMMO BI INCL CAD: CPT

## 2023-08-16 NOTE — ED NOTES
[facility]  ED Nurse Handoff Report    ED Chief complaint: No chief complaint on file.      ED Diagnosis:   Final diagnoses:   Pneumonia due to COVID-19 virus       Code Status: Full Code    Allergies: No Known Allergies    Patient Story:   Pt presents through triage c/o CP, HA fever and cough. Covid positive about 2 weeks ago.    Focused Assessment:    8/10 non radiating mid-sternal CP.  Persistent dry cough with O2 sat's in low 90's RA.  Headache  Generalized Body aches  103.2 F Fever    Treatments and/or interventions provided:   975 mg Tylenol  2 MG Morphine  2 g Rocephin    Patient's response to treatments and/or interventions:   Partial pain relief following morphine.    To be done/followed up on inpatient unit:    Continue with plan of care    Does this patient have any cognitive concerns?: None    Activity level - Baseline/Home:  Independent  Activity Level - Current:   Independent    Patient's Preferred language: English   Needed?: No    Isolation: Contact , Droplet and Other: Covid  Infection: Not Applicable  Other   Bariatric?: No    Vital Signs:   Vitals:    05/28/20 2200 05/28/20 2215 05/28/20 2230 05/28/20 2245   BP: 118/53  90/40    Pulse: 103  98    Resp: 26 14 26 24   Temp:       TempSrc:       SpO2: 93% 95% 92% 94%   Weight:       Height:           Cardiac Rhythm:Cardiac Rhythm: Sinus tachycardia    Was the PSS-3 completed:   Yes  What interventions are required if any?               Family Comments: None in ED.  OBS brochure/video discussed/provided to patient/family: Yes              Name of person given brochure if not patient: n/a              Relationship to patient: n/a    For the majority of the shift this patient's behavior was Green.   Behavioral interventions performed were none.    ED NURSE PHONE NUMBER: [phone]          DISPLAY PLAN FREE TEXT DISPLAY PLAN FREE TEXT

## 2024-06-25 ENCOUNTER — PATIENT OUTREACH (OUTPATIENT)
Dept: CARE COORDINATION | Facility: CLINIC | Age: 59
End: 2024-06-25
Payer: COMMERCIAL

## 2024-06-25 ENCOUNTER — TELEPHONE (OUTPATIENT)
Dept: FAMILY MEDICINE | Facility: CLINIC | Age: 59
End: 2024-06-25
Payer: COMMERCIAL

## 2024-06-25 NOTE — TELEPHONE ENCOUNTER
Reason for Call:  Appointment Request    Patient requesting this type of appt: Pre-op    Requested provider: Anitha Terry or anyone    Reason patient unable to be scheduled: Not within requested timeframe    When does patient want to be seen/preferred time: 1-2 days    Comments: PT needs a preop. DOS: 07/02/2024, Dental Work, Dr. Elbert Hood at [facility] [place]. Only is available today or tomorrow.     Could we send this information to you in [place] or would you prefer to receive a phone call?:   Patient would prefer a phone call   Okay to leave a detailed message?: Yes at Cell number on file:    Telephone Information:   Mobile [phone]       Call taken on 6/25/2024 at 11:56 AM by Jena Weems

## 2024-07-09 ENCOUNTER — PATIENT OUTREACH (OUTPATIENT)
Dept: CARE COORDINATION | Facility: CLINIC | Age: 59
End: 2024-07-09
Payer: COMMERCIAL

## 2024-07-17 ENCOUNTER — PATIENT OUTREACH (OUTPATIENT)
Dept: CARE COORDINATION | Facility: CLINIC | Age: 59
End: 2024-07-17
Payer: COMMERCIAL

## 2024-08-14 ENCOUNTER — PATIENT OUTREACH (OUTPATIENT)
Dept: CARE COORDINATION | Facility: CLINIC | Age: 59
End: 2024-08-14
Payer: COMMERCIAL

## 2024-11-23 ENCOUNTER — HEALTH MAINTENANCE LETTER (OUTPATIENT)
Age: 59
End: 2024-11-23

## 2024-12-26 NOTE — RESULT ENCOUNTER NOTE
The result shows you have a bacteria that is resistant somewhat to the antibiotic we put you on. However, most ot the time, it works anyway. BUT, if you aren't having the symptoms going away, we could try a different antibiotic [FreeTextEntry1] : 35 year old woman with migraines presenting for CPE and with complaint of episodes of lightheadedness, blurry vision, warmth over body  #Orthostasis - Reported symptoms and surround circumstances sound like orthostatic hypotension given when at home it helps when she lays down and at work when she walks - Discussed with patient ways to help prevent occurrence including counter pulsation maneuvers and use of compression stockings - Encouraged her to maintain adequate hydration while also being sure to eat food to have solute to hold onto fluids - Low suspicion for primary cardiac etiology. Hold off on EKG - Will still pursue TSH, A1C, lipid panel for evaluation and risk stratification - Counseled to return to clinic if worsening or new symptoms  #Vaginal discharge - Green discharge more in line with vaginosis, but only one occurrence a month ago per patient endorsement. None since, no malodor, no vaginal itching reported - Hold off on empiric treatment given above  #HCM - CBC, CMP, lipid, A1C, TSH, hep B, hep C - Flu vaccine today - Weight management referral placed - Gyn referral - Not due for pap at this time  Return to clinic in one year for next CPE or sooner as needed   Patient's visit and plan of care discussed with Dr. Ami Frausto MD PGY3

## 2025-06-02 ENCOUNTER — PATIENT OUTREACH (OUTPATIENT)
Dept: CARE COORDINATION | Facility: CLINIC | Age: 60
End: 2025-06-02
Payer: COMMERCIAL

## 2025-08-03 ENCOUNTER — HEALTH MAINTENANCE LETTER (OUTPATIENT)
Age: 60
End: 2025-08-03

## (undated) DEVICE — NIM PROBE PRASS STIMULATOR PROTECTED TIP 8225101

## (undated) DEVICE — SU VICRYL 4-0 TIE 12X18" DYED J103T

## (undated) DEVICE — PACK UNIVERSAL SPLIT 29131

## (undated) DEVICE — NDL 25GA 1.5" 305127

## (undated) DEVICE — ESU HOLSTER PLASTIC DISP E2400

## (undated) DEVICE — SYR EAR BULB 3OZ 0035830

## (undated) DEVICE — DRSG STERI STRIP 1/4X3" R1541

## (undated) DEVICE — LINEN TOWEL PACK X5 5464

## (undated) DEVICE — SOL WATER IRRIG 1000ML BOTTLE 2F7114

## (undated) DEVICE — PREP CHLORAPREP W/ORANGE TINT 10.5ML 930715

## (undated) DEVICE — SUCTION CANISTER MEDIVAC LINER 3000ML W/LID 65651-530

## (undated) DEVICE — GLOVE PROTEXIS BLUE W/NEU-THERA 7.5  2D73EB75

## (undated) DEVICE — NDL 22GA 1.5"

## (undated) DEVICE — SYR 03ML LL W/O NDL 309657

## (undated) DEVICE — ESU GROUND PAD UNIVERSAL W/O CORD

## (undated) DEVICE — GLOVE PROTEXIS W/NEU-THERA 7.5  2D73TE75

## (undated) DEVICE — BLADE KNIFE SURG 15 371115

## (undated) DEVICE — PACK MINOR SBA15MIFSE

## (undated) DEVICE — DRSG TEGADERM 2 1/2X 2 3/4"

## (undated) DEVICE — ESU ELEC BLADE 2.75" COATED/INSULATED E1455

## (undated) DEVICE — SU VICRYL 3-0 SH 27" UND J416H

## (undated) DEVICE — SU VICRYL 2-0 TIE 12X18" J905T

## (undated) DEVICE — DRSG GAUZE 2X2" 8042

## (undated) DEVICE — SOL NACL 0.9% IRRIG 1000ML BOTTLE 2F7124

## (undated) DEVICE — SU MONOCRYL 4-0 P-3 18" UND Y494G

## (undated) RX ORDER — REMIFENTANIL HYDROCHLORIDE 1 MG/ML
INJECTION, POWDER, LYOPHILIZED, FOR SOLUTION INTRAVENOUS
Status: DISPENSED
Start: 2021-12-23

## (undated) RX ORDER — ONDANSETRON 2 MG/ML
INJECTION INTRAMUSCULAR; INTRAVENOUS
Status: DISPENSED
Start: 2021-12-23

## (undated) RX ORDER — FENTANYL CITRATE 50 UG/ML
INJECTION, SOLUTION INTRAMUSCULAR; INTRAVENOUS
Status: DISPENSED
Start: 2021-12-23

## (undated) RX ORDER — LIDOCAINE HYDROCHLORIDE 20 MG/ML
INJECTION, SOLUTION EPIDURAL; INFILTRATION; INTRACAUDAL; PERINEURAL
Status: DISPENSED
Start: 2021-12-23

## (undated) RX ORDER — DEXAMETHASONE SODIUM PHOSPHATE 4 MG/ML
INJECTION, SOLUTION INTRA-ARTICULAR; INTRALESIONAL; INTRAMUSCULAR; INTRAVENOUS; SOFT TISSUE
Status: DISPENSED
Start: 2021-12-23

## (undated) RX ORDER — CEFAZOLIN SODIUM 2 G/100ML
INJECTION, SOLUTION INTRAVENOUS
Status: DISPENSED
Start: 2021-12-23

## (undated) RX ORDER — HYDROCODONE BITARTRATE AND ACETAMINOPHEN 5; 325 MG/1; MG/1
TABLET ORAL
Status: DISPENSED
Start: 2021-12-23

## (undated) RX ORDER — PROPOFOL 10 MG/ML
INJECTION, EMULSION INTRAVENOUS
Status: DISPENSED
Start: 2021-12-23